# Patient Record
Sex: FEMALE | Race: WHITE | Employment: OTHER | URBAN - METROPOLITAN AREA
[De-identification: names, ages, dates, MRNs, and addresses within clinical notes are randomized per-mention and may not be internally consistent; named-entity substitution may affect disease eponyms.]

---

## 2020-06-29 ENCOUNTER — HOSPITAL ENCOUNTER (OUTPATIENT)
Dept: LAB | Age: 85
Discharge: HOME | End: 2020-06-29

## 2020-06-29 LAB
ANION GAP SERPL CALC-SCNC: 6 MMOL/L (ref 7–16)
BASOPHILS # BLD: 0 K/UL (ref 0–0.2)
BASOPHILS NFR BLD: 0 % (ref 0–2)
BUN SERPL-MCNC: 22 MG/DL (ref 8–23)
CALCIUM SERPL-MCNC: 9 MG/DL (ref 8.3–10.4)
CHLORIDE SERPL-SCNC: 106 MMOL/L (ref 98–107)
CO2 SERPL-SCNC: 29 MMOL/L (ref 21–32)
CREAT SERPL-MCNC: 1.05 MG/DL (ref 0.6–1)
DIFFERENTIAL METHOD BLD: ABNORMAL
EOSINOPHIL # BLD: 0.1 K/UL (ref 0–0.8)
EOSINOPHIL NFR BLD: 1 % (ref 0.5–7.8)
ERYTHROCYTE [DISTWIDTH] IN BLOOD BY AUTOMATED COUNT: 13.7 % (ref 11.9–14.6)
GLUCOSE SERPL-MCNC: 99 MG/DL (ref 65–100)
HCT VFR BLD AUTO: 36.9 % (ref 35.8–46.3)
HGB BLD-MCNC: 11 G/DL (ref 11.7–15.4)
IMM GRANULOCYTES # BLD AUTO: 0 K/UL (ref 0–0.5)
IMM GRANULOCYTES NFR BLD AUTO: 0 % (ref 0–5)
LYMPHOCYTES # BLD: 2.9 K/UL (ref 0.5–4.6)
LYMPHOCYTES NFR BLD: 33 % (ref 13–44)
MCH RBC QN AUTO: 27.5 PG (ref 26.1–32.9)
MCHC RBC AUTO-ENTMCNC: 29.8 G/DL (ref 31.4–35)
MCV RBC AUTO: 92.3 FL (ref 79.6–97.8)
MONOCYTES # BLD: 0.5 K/UL (ref 0.1–1.3)
MONOCYTES NFR BLD: 6 % (ref 4–12)
NEUTS SEG # BLD: 5.2 K/UL (ref 1.7–8.2)
NEUTS SEG NFR BLD: 60 % (ref 43–78)
NRBC # BLD: 0 K/UL (ref 0–0.2)
PLATELET # BLD AUTO: 143 K/UL (ref 150–450)
PMV BLD AUTO: 13.2 FL (ref 9.4–12.3)
POTASSIUM SERPL-SCNC: 4.2 MMOL/L (ref 3.5–5.1)
RBC # BLD AUTO: 4 M/UL (ref 4.05–5.2)
SODIUM SERPL-SCNC: 141 MMOL/L (ref 136–145)
WBC # BLD AUTO: 8.7 K/UL (ref 4.3–11.1)

## 2020-06-29 PROCEDURE — 85025 COMPLETE CBC W/AUTO DIFF WBC: CPT

## 2020-06-29 PROCEDURE — 80048 BASIC METABOLIC PNL TOTAL CA: CPT

## 2020-08-02 ENCOUNTER — HOSPITAL ENCOUNTER (INPATIENT)
Age: 85
LOS: 5 days | Discharge: NURSING, REHAB OR LONG-TERM CARE | DRG: 871 | End: 2020-08-07
Attending: EMERGENCY MEDICINE | Admitting: INTERNAL MEDICINE
Payer: MEDICARE

## 2020-08-02 ENCOUNTER — APPOINTMENT (OUTPATIENT)
Dept: GENERAL RADIOLOGY | Age: 85
DRG: 871 | End: 2020-08-02
Attending: EMERGENCY MEDICINE
Payer: MEDICARE

## 2020-08-02 ENCOUNTER — APPOINTMENT (OUTPATIENT)
Dept: CT IMAGING | Age: 85
DRG: 871 | End: 2020-08-02
Attending: INTERNAL MEDICINE
Payer: MEDICARE

## 2020-08-02 DIAGNOSIS — N30.90 CYSTITIS: ICD-10-CM

## 2020-08-02 DIAGNOSIS — R41.82 ALTERED MENTAL STATUS, UNSPECIFIED ALTERED MENTAL STATUS TYPE: Primary | ICD-10-CM

## 2020-08-02 DIAGNOSIS — N17.9 AKI (ACUTE KIDNEY INJURY) (HCC): ICD-10-CM

## 2020-08-02 DIAGNOSIS — E86.0 DEHYDRATION: ICD-10-CM

## 2020-08-02 PROBLEM — G30.9 ALZHEIMER'S DEMENTIA WITH BEHAVIORAL DISTURBANCE (HCC): Chronic | Status: ACTIVE | Noted: 2020-08-02

## 2020-08-02 PROBLEM — G93.41 ACUTE METABOLIC ENCEPHALOPATHY: Status: ACTIVE | Noted: 2020-08-02

## 2020-08-02 PROBLEM — Z66 DNR (DO NOT RESUSCITATE): Chronic | Status: ACTIVE | Noted: 2020-08-02

## 2020-08-02 PROBLEM — E87.0 HYPERNATREMIA: Status: ACTIVE | Noted: 2020-08-02

## 2020-08-02 PROBLEM — F02.818 ALZHEIMER'S DEMENTIA WITH BEHAVIORAL DISTURBANCE (HCC): Chronic | Status: ACTIVE | Noted: 2020-08-02

## 2020-08-02 PROBLEM — N39.0 UTI (URINARY TRACT INFECTION): Status: ACTIVE | Noted: 2020-08-02

## 2020-08-02 LAB
ALBUMIN SERPL-MCNC: 2.5 G/DL (ref 3.2–4.6)
ALBUMIN/GLOB SERPL: 0.5 {RATIO} (ref 1.2–3.5)
ALP SERPL-CCNC: 101 U/L (ref 50–136)
ALT SERPL-CCNC: 17 U/L (ref 12–65)
ANION GAP SERPL CALC-SCNC: 7 MMOL/L (ref 7–16)
APPEARANCE UR: ABNORMAL
AST SERPL-CCNC: 22 U/L (ref 15–37)
BACTERIA URNS QL MICRO: ABNORMAL /HPF
BASOPHILS # BLD: 0 K/UL (ref 0–0.2)
BASOPHILS NFR BLD: 0 % (ref 0–2)
BILIRUB SERPL-MCNC: 0.3 MG/DL (ref 0.2–1.1)
BILIRUB UR QL: NEGATIVE
BUN SERPL-MCNC: 56 MG/DL (ref 8–23)
CALCIUM SERPL-MCNC: 9 MG/DL (ref 8.3–10.4)
CHLORIDE SERPL-SCNC: 118 MMOL/L (ref 98–107)
CO2 SERPL-SCNC: 25 MMOL/L (ref 21–32)
COLOR UR: YELLOW
CREAT SERPL-MCNC: 2 MG/DL (ref 0.6–1)
DIFFERENTIAL METHOD BLD: ABNORMAL
EOSINOPHIL # BLD: 0 K/UL (ref 0–0.8)
EOSINOPHIL NFR BLD: 0 % (ref 0.5–7.8)
EPI CELLS #/AREA URNS HPF: ABNORMAL /HPF
ERYTHROCYTE [DISTWIDTH] IN BLOOD BY AUTOMATED COUNT: 13.6 % (ref 11.9–14.6)
GLOBULIN SER CALC-MCNC: 5 G/DL (ref 2.3–3.5)
GLUCOSE BLD STRIP.AUTO-MCNC: 108 MG/DL (ref 65–100)
GLUCOSE BLD STRIP.AUTO-MCNC: 90 MG/DL (ref 65–100)
GLUCOSE SERPL-MCNC: 95 MG/DL (ref 65–100)
GLUCOSE UR STRIP.AUTO-MCNC: NEGATIVE MG/DL
HCT VFR BLD AUTO: 33.5 % (ref 35.8–46.3)
HGB BLD-MCNC: 10.5 G/DL (ref 11.7–15.4)
HGB UR QL STRIP: ABNORMAL
IMM GRANULOCYTES # BLD AUTO: 0.1 K/UL (ref 0–0.5)
IMM GRANULOCYTES NFR BLD AUTO: 1 % (ref 0–5)
KETONES UR QL STRIP.AUTO: NEGATIVE MG/DL
LACTATE SERPL-SCNC: 1 MMOL/L (ref 0.4–2)
LEUKOCYTE ESTERASE UR QL STRIP.AUTO: ABNORMAL
LIPASE SERPL-CCNC: 299 U/L (ref 73–393)
LYMPHOCYTES # BLD: 1.8 K/UL (ref 0.5–4.6)
LYMPHOCYTES NFR BLD: 29 % (ref 13–44)
MCH RBC QN AUTO: 28.3 PG (ref 26.1–32.9)
MCHC RBC AUTO-ENTMCNC: 31.3 G/DL (ref 31.4–35)
MCV RBC AUTO: 90.3 FL (ref 79.6–97.8)
MONOCYTES # BLD: 0.5 K/UL (ref 0.1–1.3)
MONOCYTES NFR BLD: 8 % (ref 4–12)
NEUTS SEG # BLD: 3.7 K/UL (ref 1.7–8.2)
NEUTS SEG NFR BLD: 62 % (ref 43–78)
NITRITE UR QL STRIP.AUTO: NEGATIVE
NRBC # BLD: 0 K/UL (ref 0–0.2)
OTHER OBSERVATIONS,UCOM: ABNORMAL
PH UR STRIP: 8 [PH] (ref 5–9)
PLATELET # BLD AUTO: 142 K/UL (ref 150–450)
PMV BLD AUTO: 13.4 FL (ref 9.4–12.3)
POTASSIUM SERPL-SCNC: 4.4 MMOL/L (ref 3.5–5.1)
PROT SERPL-MCNC: 7.5 G/DL (ref 6.3–8.2)
PROT UR STRIP-MCNC: 100 MG/DL
RBC # BLD AUTO: 3.71 M/UL (ref 4.05–5.2)
RBC #/AREA URNS HPF: ABNORMAL /HPF
SODIUM SERPL-SCNC: 150 MMOL/L (ref 136–145)
SP GR UR REFRACTOMETRY: 1.01 (ref 1–1.02)
UROBILINOGEN UR QL STRIP.AUTO: 1 EU/DL (ref 0.2–1)
WBC # BLD AUTO: 6.1 K/UL (ref 4.3–11.1)
WBC URNS QL MICRO: >100 /HPF
YEAST URNS QL MICRO: ABNORMAL

## 2020-08-02 PROCEDURE — 74011000258 HC RX REV CODE- 258: Performed by: EMERGENCY MEDICINE

## 2020-08-02 PROCEDURE — 65270000029 HC RM PRIVATE

## 2020-08-02 PROCEDURE — 87086 URINE CULTURE/COLONY COUNT: CPT

## 2020-08-02 PROCEDURE — 82962 GLUCOSE BLOOD TEST: CPT

## 2020-08-02 PROCEDURE — 96365 THER/PROPH/DIAG IV INF INIT: CPT

## 2020-08-02 PROCEDURE — 74011250636 HC RX REV CODE- 250/636: Performed by: INTERNAL MEDICINE

## 2020-08-02 PROCEDURE — 74011250637 HC RX REV CODE- 250/637: Performed by: INTERNAL MEDICINE

## 2020-08-02 PROCEDURE — 99285 EMERGENCY DEPT VISIT HI MDM: CPT

## 2020-08-02 PROCEDURE — 80053 COMPREHEN METABOLIC PANEL: CPT

## 2020-08-02 PROCEDURE — 51702 INSERT TEMP BLADDER CATH: CPT

## 2020-08-02 PROCEDURE — 81001 URINALYSIS AUTO W/SCOPE: CPT

## 2020-08-02 PROCEDURE — 83690 ASSAY OF LIPASE: CPT

## 2020-08-02 PROCEDURE — 71045 X-RAY EXAM CHEST 1 VIEW: CPT

## 2020-08-02 PROCEDURE — 74011250636 HC RX REV CODE- 250/636: Performed by: EMERGENCY MEDICINE

## 2020-08-02 PROCEDURE — 0T9B70Z DRAINAGE OF BLADDER WITH DRAINAGE DEVICE, VIA NATURAL OR ARTIFICIAL OPENING: ICD-10-PCS | Performed by: INTERNAL MEDICINE

## 2020-08-02 PROCEDURE — 87040 BLOOD CULTURE FOR BACTERIA: CPT

## 2020-08-02 PROCEDURE — 74011000258 HC RX REV CODE- 258: Performed by: INTERNAL MEDICINE

## 2020-08-02 PROCEDURE — 70450 CT HEAD/BRAIN W/O DYE: CPT

## 2020-08-02 PROCEDURE — 83605 ASSAY OF LACTIC ACID: CPT

## 2020-08-02 PROCEDURE — 85025 COMPLETE CBC W/AUTO DIFF WBC: CPT

## 2020-08-02 PROCEDURE — 74011250637 HC RX REV CODE- 250/637: Performed by: EMERGENCY MEDICINE

## 2020-08-02 RX ORDER — OXYCODONE HYDROCHLORIDE 5 MG/1
5 TABLET ORAL
Status: DISCONTINUED | OUTPATIENT
Start: 2020-08-02 | End: 2020-08-07 | Stop reason: HOSPADM

## 2020-08-02 RX ORDER — MIRTAZAPINE 15 MG/1
15 TABLET, FILM COATED ORAL
Status: DISCONTINUED | OUTPATIENT
Start: 2020-08-02 | End: 2020-08-07 | Stop reason: HOSPADM

## 2020-08-02 RX ORDER — POLYETHYLENE GLYCOL 3350 17 G/17G
17 POWDER, FOR SOLUTION ORAL DAILY PRN
Status: DISCONTINUED | OUTPATIENT
Start: 2020-08-02 | End: 2020-08-07 | Stop reason: HOSPADM

## 2020-08-02 RX ORDER — NALOXONE HYDROCHLORIDE 1 MG/ML
2 INJECTION INTRAMUSCULAR; INTRAVENOUS; SUBCUTANEOUS
Status: DISCONTINUED | OUTPATIENT
Start: 2020-08-02 | End: 2020-08-02

## 2020-08-02 RX ORDER — FACIAL-BODY WIPES
10 EACH TOPICAL DAILY PRN
Status: DISCONTINUED | OUTPATIENT
Start: 2020-08-02 | End: 2020-08-07 | Stop reason: HOSPADM

## 2020-08-02 RX ORDER — ONDANSETRON 4 MG/1
4 TABLET, ORALLY DISINTEGRATING ORAL
Status: DISCONTINUED | OUTPATIENT
Start: 2020-08-02 | End: 2020-08-07 | Stop reason: HOSPADM

## 2020-08-02 RX ORDER — ROSUVASTATIN CALCIUM 20 MG/1
20 TABLET, COATED ORAL
Status: DISCONTINUED | OUTPATIENT
Start: 2020-08-02 | End: 2020-08-07 | Stop reason: HOSPADM

## 2020-08-02 RX ORDER — SODIUM CHLORIDE 9 MG/ML
150 INJECTION, SOLUTION INTRAVENOUS ONCE
Status: DISCONTINUED | OUTPATIENT
Start: 2020-08-02 | End: 2020-08-02

## 2020-08-02 RX ORDER — FAMOTIDINE 20 MG/1
20 TABLET, FILM COATED ORAL
Status: DISCONTINUED | OUTPATIENT
Start: 2020-08-02 | End: 2020-08-04

## 2020-08-02 RX ORDER — HYDRALAZINE HYDROCHLORIDE 20 MG/ML
20 INJECTION INTRAMUSCULAR; INTRAVENOUS
Status: DISCONTINUED | OUTPATIENT
Start: 2020-08-02 | End: 2020-08-07 | Stop reason: HOSPADM

## 2020-08-02 RX ORDER — SODIUM CHLORIDE 9 MG/ML
100 INJECTION, SOLUTION INTRAVENOUS ONCE
Status: COMPLETED | OUTPATIENT
Start: 2020-08-02 | End: 2020-08-02

## 2020-08-02 RX ORDER — MAGNESIUM SULFATE HEPTAHYDRATE 40 MG/ML
2 INJECTION, SOLUTION INTRAVENOUS AS NEEDED
Status: DISCONTINUED | OUTPATIENT
Start: 2020-08-02 | End: 2020-08-07 | Stop reason: HOSPADM

## 2020-08-02 RX ORDER — LORAZEPAM 0.5 MG/1
0.5 TABLET ORAL
Status: DISCONTINUED | OUTPATIENT
Start: 2020-08-02 | End: 2020-08-07 | Stop reason: HOSPADM

## 2020-08-02 RX ORDER — ACETAMINOPHEN 650 MG/1
650 SUPPOSITORY RECTAL
Status: DISCONTINUED | OUTPATIENT
Start: 2020-08-02 | End: 2020-08-07 | Stop reason: HOSPADM

## 2020-08-02 RX ORDER — ACETAMINOPHEN 650 MG/1
650 SUPPOSITORY RECTAL
Status: COMPLETED | OUTPATIENT
Start: 2020-08-02 | End: 2020-08-02

## 2020-08-02 RX ORDER — POTASSIUM CHLORIDE 750 MG/1
40 TABLET, EXTENDED RELEASE ORAL AS NEEDED
Status: DISCONTINUED | OUTPATIENT
Start: 2020-08-02 | End: 2020-08-07 | Stop reason: HOSPADM

## 2020-08-02 RX ORDER — POTASSIUM CHLORIDE 7.45 MG/ML
10 INJECTION INTRAVENOUS AS NEEDED
Status: DISCONTINUED | OUTPATIENT
Start: 2020-08-02 | End: 2020-08-07 | Stop reason: HOSPADM

## 2020-08-02 RX ORDER — INSULIN LISPRO 100 [IU]/ML
INJECTION, SOLUTION INTRAVENOUS; SUBCUTANEOUS
Status: DISCONTINUED | OUTPATIENT
Start: 2020-08-02 | End: 2020-08-07 | Stop reason: HOSPADM

## 2020-08-02 RX ORDER — HEPARIN SODIUM 5000 [USP'U]/ML
5000 INJECTION, SOLUTION INTRAVENOUS; SUBCUTANEOUS EVERY 8 HOURS
Status: DISCONTINUED | OUTPATIENT
Start: 2020-08-02 | End: 2020-08-07

## 2020-08-02 RX ORDER — ONDANSETRON 2 MG/ML
4 INJECTION INTRAMUSCULAR; INTRAVENOUS
Status: DISCONTINUED | OUTPATIENT
Start: 2020-08-02 | End: 2020-08-07 | Stop reason: HOSPADM

## 2020-08-02 RX ORDER — MAG HYDROX/ALUMINUM HYD/SIMETH 200-200-20
30 SUSPENSION, ORAL (FINAL DOSE FORM) ORAL
Status: DISCONTINUED | OUTPATIENT
Start: 2020-08-02 | End: 2020-08-07 | Stop reason: HOSPADM

## 2020-08-02 RX ORDER — DICLOFENAC SODIUM 75 MG/1
75 TABLET, DELAYED RELEASE ORAL 2 TIMES DAILY
Status: DISCONTINUED | OUTPATIENT
Start: 2020-08-02 | End: 2020-08-03

## 2020-08-02 RX ORDER — DEXTROSE 50 % IN WATER (D50W) INTRAVENOUS SYRINGE
25-50 AS NEEDED
Status: DISCONTINUED | OUTPATIENT
Start: 2020-08-02 | End: 2020-08-07 | Stop reason: HOSPADM

## 2020-08-02 RX ORDER — DEXTROSE MONOHYDRATE AND SODIUM CHLORIDE 5; .45 G/100ML; G/100ML
125 INJECTION, SOLUTION INTRAVENOUS CONTINUOUS
Status: DISCONTINUED | OUTPATIENT
Start: 2020-08-02 | End: 2020-08-03

## 2020-08-02 RX ORDER — OXYCODONE HYDROCHLORIDE 5 MG/1
10 TABLET ORAL
Status: DISCONTINUED | OUTPATIENT
Start: 2020-08-02 | End: 2020-08-07 | Stop reason: HOSPADM

## 2020-08-02 RX ORDER — GUAIFENESIN/DEXTROMETHORPHAN 100-10MG/5
10 SYRUP ORAL
Status: DISCONTINUED | OUTPATIENT
Start: 2020-08-02 | End: 2020-08-07 | Stop reason: HOSPADM

## 2020-08-02 RX ORDER — GUAIFENESIN 600 MG/1
1200 TABLET, EXTENDED RELEASE ORAL
Status: DISCONTINUED | OUTPATIENT
Start: 2020-08-02 | End: 2020-08-07 | Stop reason: HOSPADM

## 2020-08-02 RX ORDER — HYDROXYZINE PAMOATE 50 MG/1
50 CAPSULE ORAL
Status: DISCONTINUED | OUTPATIENT
Start: 2020-08-02 | End: 2020-08-07 | Stop reason: HOSPADM

## 2020-08-02 RX ORDER — AMOXICILLIN 250 MG
2 CAPSULE ORAL
Status: DISCONTINUED | OUTPATIENT
Start: 2020-08-02 | End: 2020-08-07 | Stop reason: HOSPADM

## 2020-08-02 RX ORDER — DEXTROSE 40 %
15 GEL (GRAM) ORAL AS NEEDED
Status: DISCONTINUED | OUTPATIENT
Start: 2020-08-02 | End: 2020-08-07 | Stop reason: HOSPADM

## 2020-08-02 RX ORDER — ACETAMINOPHEN 325 MG/1
650 TABLET ORAL
Status: DISCONTINUED | OUTPATIENT
Start: 2020-08-02 | End: 2020-08-07 | Stop reason: HOSPADM

## 2020-08-02 RX ORDER — SODIUM CHLORIDE 0.9 % (FLUSH) 0.9 %
5-40 SYRINGE (ML) INJECTION EVERY 8 HOURS
Status: DISCONTINUED | OUTPATIENT
Start: 2020-08-02 | End: 2020-08-07 | Stop reason: HOSPADM

## 2020-08-02 RX ORDER — ZOLPIDEM TARTRATE 5 MG/1
5 TABLET ORAL
Status: DISCONTINUED | OUTPATIENT
Start: 2020-08-02 | End: 2020-08-03

## 2020-08-02 RX ORDER — SODIUM CHLORIDE 0.9 % (FLUSH) 0.9 %
5-40 SYRINGE (ML) INJECTION AS NEEDED
Status: DISCONTINUED | OUTPATIENT
Start: 2020-08-02 | End: 2020-08-07 | Stop reason: HOSPADM

## 2020-08-02 RX ADMIN — SODIUM CHLORIDE 100 ML/HR: 9 INJECTION, SOLUTION INTRAVENOUS at 15:58

## 2020-08-02 RX ADMIN — Medication 10 ML: at 21:23

## 2020-08-02 RX ADMIN — ROSUVASTATIN CALCIUM 20 MG: 20 TABLET, COATED ORAL at 21:23

## 2020-08-02 RX ADMIN — Medication 10 ML: at 17:14

## 2020-08-02 RX ADMIN — CEFTRIAXONE SODIUM 1 G: 1 INJECTION, POWDER, FOR SOLUTION INTRAMUSCULAR; INTRAVENOUS at 19:54

## 2020-08-02 RX ADMIN — Medication 10 ML: at 18:00

## 2020-08-02 RX ADMIN — HEPARIN SODIUM 5000 UNITS: 5000 INJECTION INTRAVENOUS; SUBCUTANEOUS at 21:23

## 2020-08-02 RX ADMIN — HEPARIN SODIUM 5000 UNITS: 5000 INJECTION INTRAVENOUS; SUBCUTANEOUS at 17:30

## 2020-08-02 RX ADMIN — SODIUM CHLORIDE 1000 ML: 9 INJECTION, SOLUTION INTRAVENOUS at 14:35

## 2020-08-02 RX ADMIN — DEXTROSE MONOHYDRATE AND SODIUM CHLORIDE 125 ML/HR: 5; .45 INJECTION, SOLUTION INTRAVENOUS at 17:13

## 2020-08-02 RX ADMIN — ACETAMINOPHEN 650 MG: 650 SUPPOSITORY RECTAL at 14:35

## 2020-08-02 RX ADMIN — CEFEPIME HYDROCHLORIDE 2 G: 2 INJECTION, POWDER, FOR SOLUTION INTRAVENOUS at 15:08

## 2020-08-02 NOTE — ED TRIAGE NOTES
Pt arrived via EMS from [facility] with c/o ams. Pt has hx of alzheimer and is no longer responding to any stimuli that started today. Pt is unresponsive at this time. Pt is a DNR.  /78 HR 63 SpO 94 RR 16

## 2020-08-02 NOTE — H&P
Hospitalist Note     Admit Date:  2020  2:03 PM   Name:  Hiren Prabhakar   Age:  81 y.o.  :  1935   MRN:  [id number]   PCP:  Toya Siddiqi MD  Treatment Team: Attending Provider: Tavares Zimmer MD; Primary Nurse: Rubén Strong RN    HPI/Subjective:   Pt is an 82 y/o F resident of [facility] with DM, HTN, who presented to ER with AMS. History limited to chart info due to confusion. Unclear baseline mental status but dementia documented in chart. She denies pain or complaints but cannot give focused history and has a hard time focusing on me; eyes wander. Staff at [facility] reported she was less responsive so sent her here. She was found to have UTI, KAILEY, hyperNa on lab workup. Urine brown and cloudy    ROS cannot be obtained due to pt condition  Past Medical History:   Diagnosis Date    Benign hypertension     Controlled type 2 diabetes mellitus with diabetic autonomic neuropathy, with long-term current use of insulin (HCC)     Edema     Hyperlipidemia       Past Surgical History:   Procedure Laterality Date    HX AMPUTATION Left     Partial foot    HX CORONARY ARTERY BYPASS GRAFT      HX KNEE ARTHROSCOPY Bilateral     HX PARTIAL HYSTERECTOMY        No Known Allergies   Social History     Tobacco Use    Smoking status: Never Smoker    Smokeless tobacco: Never Used   Substance Use Topics    Alcohol use: No      Family History   Problem Relation Age of Onset    Heart Disease Mother       Family history reviewed and noncontributory. Immunization History   Administered Date(s) Administered    Influenza High Dose Vaccine PF 10/13/2015, 2016    Influenza Vaccine (Trivalent) 2015, 2016    Pneumococcal Polysaccharide (PPSV-23) 2004, 10/04/2013, 2015     PTA Medications:  Prior to Admission Medications   Prescriptions Last Dose Informant Patient Reported? Taking?    BD INSULIN PEN NEEDLE UF MINI 31 gauge x 3/16\" ndle   No No   Sig: USE 1 NEEDLE DAILY   BD SINGLE USE SWABS REGULAR padm   Yes No   TRAVATAN Z 0.004 % ophthalmic solution   Yes No   Sig: INSTILL 1 DROP IN BOTH EYES NIGHTLY   TRUE METRIX AIR GLUCOSE METER monitoring kit   No No   Sig: USE AS DIRECTED   TRUE METRIX GLUCOSE TEST STRIP strip   Yes No   TRUE METRIX LEVEL 1 soln   Yes No   TRUEPLUS LANCETS 28 gauge misc   Yes No   diclofenac EC (VOLTAREN) 75 mg EC tablet   No No   Sig: Take 1 Tab by mouth two (2) times a day. furosemide (LASIX) 40 mg tablet   No No   Sig: TAKE 1 TABLET BY MOUTH EVERY DAY   insulin detemir (LEVEMIR FLEXPEN) 100 unit/mL (3 mL) inpn   No No   Si Units by SubCUTAneous route daily. insulin lispro (HUMALOG) 100 unit/mL injection   Yes No   Sig: by SubCUTAneous route. rosuvastatin (CRESTOR) 20 mg tablet   No No   Sig: TAKE 1 TABLET BY MOUTH EVERY DAY   valsartan (DIOVAN) 160 mg tablet   No No   Sig: Take 1 Tab by mouth daily. Facility-Administered Medications: None       Objective:     Patient Vitals for the past 24 hrs:   Temp Pulse Resp BP SpO2   20 1428 99.9 °F (37.7 °C)       20 1405 97.7 °F (36.5 °C) 63 14 127/51 92 %     Oxygen Therapy  O2 Sat (%): 92 % (20 1405)  O2 Device: Room air (20 1405)    Estimated body mass index is 34.95 kg/m² as calculated from the following:    Height as of this encounter: 5' 5\" (1.651 m). Weight as of this encounter: 95.3 kg (210 lb). No intake or output data in the 24 hours ending 20 1549    *Note that automatically entered I/Os may not be accurate; dependent on patient compliance with collection and accurate  by assistants. Physical Exam:  General:    Well nourished. Somnolent, confused. Eyes:   Normal sclerae. Extraocular movements intact. HENT:  Normocephalic, atraumatic. Moist mucous membranes  CV:   RRR. No m/r/g. Lungs:  CTAB. No wheezing, rhonchi, or rales. Abdomen: Soft, nontender, nondistended. Extremities: Warm and dry.   No cyanosis or edema. Neurologic: CN II-XII grossly intact. Sensation intact. Skin:     No rashes or jaundice. Normal coloration  Psych:   Cannot assess    I reviewed the labs, imaging, EKGs, telemetry, and other studies done this admission. Data Reviewed:   Recent Results (from the past 24 hour(s))   CBC WITH AUTOMATED DIFF    Collection Time: 08/02/20  2:27 PM   Result Value Ref Range    WBC 6.1 4.3 - 11.1 K/uL    RBC 3.71 (L) 4.05 - 5.2 M/uL    HGB 10.5 (L) 11.7 - 15.4 g/dL    HCT 33.5 (L) 35.8 - 46.3 %    MCV 90.3 79.6 - 97.8 FL    MCH 28.3 26.1 - 32.9 PG    MCHC 31.3 (L) 31.4 - 35.0 g/dL    RDW 13.6 11.9 - 14.6 %    PLATELET 808 (L) 584 - 450 K/uL    MPV 13.4 (H) 9.4 - 12.3 FL    ABSOLUTE NRBC 0.00 0.0 - 0.2 K/uL    NEUTROPHILS 62 43 - 78 %    LYMPHOCYTES 29 13 - 44 %    MONOCYTES 8 4.0 - 12.0 %    EOSINOPHILS 0 (L) 0.5 - 7.8 %    BASOPHILS 0 0.0 - 2.0 %    IMMATURE GRANULOCYTES 1 0.0 - 5.0 %    ABS. NEUTROPHILS 3.7 1.7 - 8.2 K/UL    ABS. LYMPHOCYTES 1.8 0.5 - 4.6 K/UL    ABS. MONOCYTES 0.5 0.1 - 1.3 K/UL    ABS. EOSINOPHILS 0.0 0.0 - 0.8 K/UL    ABS. BASOPHILS 0.0 0.0 - 0.2 K/UL    ABS. IMM. GRANS. 0.1 0.0 - 0.5 K/UL    DF AUTOMATED     LIPASE    Collection Time: 08/02/20  2:27 PM   Result Value Ref Range    Lipase 299 73 - 699 U/L   METABOLIC PANEL, COMPREHENSIVE    Collection Time: 08/02/20  2:27 PM   Result Value Ref Range    Sodium 150 (H) 136 - 145 mmol/L    Potassium 4.4 3.5 - 5.1 mmol/L    Chloride 118 (H) 98 - 107 mmol/L    CO2 25 21 - 32 mmol/L    Anion gap 7 7 - 16 mmol/L    Glucose 95 65 - 100 mg/dL    BUN 56 (H) 8 - 23 MG/DL    Creatinine 2.00 (H) 0.6 - 1.0 MG/DL    GFR est AA 31 (L) >60 ml/min/1.73m2    GFR est non-AA 25 (L) >60 ml/min/1.73m2    Calcium 9.0 8.3 - 10.4 MG/DL    Bilirubin, total 0.3 0.2 - 1.1 MG/DL    ALT (SGPT) 17 12 - 65 U/L    AST (SGOT) 22 15 - 37 U/L    Alk.  phosphatase 101 50 - 136 U/L    Protein, total 7.5 6.3 - 8.2 g/dL    Albumin 2.5 (L) 3.2 - 4.6 g/dL    Globulin 5.0 (H) 2.3 - 3.5 g/dL    A-G Ratio 0.5 (L) 1.2 - 3.5     URINALYSIS W/ RFLX MICROSCOPIC    Collection Time: 08/02/20  2:27 PM   Result Value Ref Range    Color YELLOW      Appearance TURBID      Specific gravity 1.013 1.001 - 1.023      pH (UA) 8.0 5.0 - 9.0      Protein 100 (A) NEG mg/dL    Glucose Negative mg/dL    Ketone Negative NEG mg/dL    Bilirubin Negative NEG      Blood LARGE (A) NEG      Urobilinogen 1.0 0.2 - 1.0 EU/dL    Nitrites Negative NEG      Leukocyte Esterase LARGE (A) NEG      WBC >100 0 /hpf    RBC 20-50 0 /hpf    Epithelial cells 0-3 0 /hpf    Bacteria 4+ (H) 0 /hpf    Yeast OCCASIONAL      Other observations RESULTS VERIFIED MANUALLY     LACTIC ACID    Collection Time: 08/02/20  2:27 PM   Result Value Ref Range    Lactic acid 1.0 0.4 - 2.0 MMOL/L       All Micro Results     Procedure Component Value Units Date/Time    CULTURE, BLOOD [[id number]] Collected:  08/02/20 1427    Order Status:  Completed Specimen:  Blood Updated:  08/02/20 1454    CULTURE, BLOOD [[id number]] Collected:  08/02/20 1430    Order Status:  Completed Specimen:  Blood Updated:  08/02/20 1454    CULTURE, URINE [[id number]]     Order Status:  Sent Specimen:  Cath Urine     CULTURE, URINE [[id number]]     Order Status:  Canceled Specimen:  Cath Urine           Current Facility-Administered Medications   Medication Dose Route Frequency    0.9% sodium chloride infusion  100 mL/hr IntraVENous ONCE     Current Outpatient Medications   Medication Sig    furosemide (LASIX) 40 mg tablet TAKE 1 TABLET BY MOUTH EVERY DAY    valsartan (DIOVAN) 160 mg tablet Take 1 Tab by mouth daily.  diclofenac EC (VOLTAREN) 75 mg EC tablet Take 1 Tab by mouth two (2) times a day.  rosuvastatin (CRESTOR) 20 mg tablet TAKE 1 TABLET BY MOUTH EVERY DAY    insulin detemir (LEVEMIR FLEXPEN) 100 unit/mL (3 mL) inpn 75 Units by SubCUTAneous route daily.     BD INSULIN PEN NEEDLE UF MINI 31 gauge x 3/16\" ndle USE 1 NEEDLE DAILY    insulin lispro (HUMALOG) 100 unit/mL injection by SubCUTAneous route.  TRAVATAN Z 0.004 % ophthalmic solution INSTILL 1 DROP IN BOTH EYES NIGHTLY    TRUE METRIX AIR GLUCOSE METER monitoring kit USE AS DIRECTED    BD SINGLE USE SWABS REGULAR padm     TRUE METRIX GLUCOSE TEST STRIP strip     TRUE METRIX LEVEL 1 soln     TRUEPLUS LANCETS 28 gauge misc        Other Studies:  No results found for this visit on 08/02/20. Xr Chest Port    Result Date: 8/2/2020  EXAM: Single view chest radiograph. INDICATION: Altered mental status. COMPARISON: None. FINDINGS: Hypoventilatory exam with bronchovascular crowding and bibasilar atelectasis. Cardiomegaly evident. Sternal wires appear intact. Mediastinal surgical clips evident. Atherosclerotic aortic arch. Advanced right shoulder joint degenerative changes. IMPRESSION: 1. Hypoventilatory exam with bronchovascular crowding and bibasilar atelectasis. 2. Cardiomegaly status post median sternotomy. SARS-CoV-2 Lab Results  \"Novel Coronavirus\" Test: No results found for: COV2NT   \"Emergent Disease\" Test: No results found for: EDPR  \"SARS-COV-2\" Test: No results found for: XGCOVT  \"Precision Labs\" Test: No results found for: RSLT  Rapid Test: No results found for: COVR           Assessment and Plan:     Hospital Problems as of 8/2/2020 Date Reviewed: 2/28/2017          Codes Class Noted - Resolved POA    Hypernatremia ICD-10-CM: E87.0  ICD-9-CM: 276.0  8/2/2020 - Present Unknown        UTI (urinary tract infection) ICD-10-CM: N39.0  ICD-9-CM: 599.0  8/2/2020 - Present Unknown        KAILEY (acute kidney injury) ([facility] [address] ICD-10-CM: N17.9  ICD-9-CM: 584.9  8/2/2020 - Present Unknown        Acute metabolic encephalopathy XFK-17-FS: G93.41  ICD-9-CM: 348.31  8/2/2020 - Present Unknown              Plan:  · Inpatient  · Rocephin  · Follow urine cx  · d5 1/2 NS IVF  · Hold home ARB, lasix  · ISS for DM.   Hold home lantus; doubt she needs 75 units as listed  · SLP eval  · PT/OT    Discharge planning:  Back to SNF when improved.   Will need negative COVID most likely but will check with CM first.    DVT ppx ordered  Code status:  DNR  Estimated LOS:  Greater than 2 midnights  Risk:  high    Signed:  Mariluz Felton MD

## 2020-08-02 NOTE — ED NOTES
TRANSFER - OUT REPORT:    Verbal report given to Yola Stallings RN on Shiloh [place]  being transferred to [facility] for routine progression of care       Report consisted of patients Situation, Background, Assessment and   Recommendations(SBAR). Information from the following report(s) SBAR was reviewed with the receiving nurse. Lines:   Peripheral IV 08/02/20 Left Antecubital (Active)   Site Assessment Clean, dry, & intact 08/02/20 1434   Phlebitis Assessment 0 08/02/20 1434   Infiltration Assessment 0 08/02/20 1434   Dressing Status Clean, dry, & intact 08/02/20 1434        Opportunity for questions and clarification was provided.       Patient transported with:   Satiety

## 2020-08-02 NOTE — ED PROVIDER NOTES
In just put a Wray in her 27-year-old female presenting from a skilled nursing facility for decreased level of consciousness. Patient apparently has Alzheimer's they stated that she was not interactive today. She was sent for further evaluation. According to the staff the patient has a DNR/DNI status. On arrival the patient is noncommunicative and staring to the left    The history is provided by the patient. Altered mental status    This is a new problem. The current episode started yesterday. The problem has been gradually worsening. Associated symptoms include confusion, somnolence, unresponsiveness and weakness. Pertinent negatives include no seizures, no agitation, no delusions, no hallucinations, no self-injury, no violence, no tingling and no numbness. Mental status baseline is severe dementia. Risk factors include dementia. Her past medical history is significant for dementia. Her past medical history does not include diabetes, seizures, liver disease, CVA, TIA, AIDS, hypertension, COPD, depression, psychotropic medication treatment, head trauma or heart disease.         Past Medical History:   Diagnosis Date    Benign hypertension     Controlled type 2 diabetes mellitus with diabetic autonomic neuropathy, with long-term current use of insulin (HCC)     Edema     Hyperlipidemia        Past Surgical History:   Procedure Laterality Date    HX AMPUTATION Left     Partial foot    HX CORONARY ARTERY BYPASS GRAFT  2005    HX KNEE ARTHROSCOPY Bilateral     HX PARTIAL HYSTERECTOMY           Family History:   Problem Relation Age of Onset    Heart Disease Mother        Social History     Socioeconomic History    Marital status:      Spouse name: Not on file    Number of children: Not on file    Years of education: Not on file    Highest education level: Not on file   Occupational History    Not on file   Social Needs    Financial resource strain: Not on file    Food insecurity Worry: Not on file     Inability: Not on file    Transportation needs     Medical: Not on file     Non-medical: Not on file   Tobacco Use    Smoking status: Never Smoker    Smokeless tobacco: Never Used   Substance and Sexual Activity    Alcohol use: No    Drug use: No    Sexual activity: Not on file   Lifestyle    Physical activity     Days per week: Not on file     Minutes per session: Not on file    Stress: Not on file   Relationships    Social connections     Talks on phone: Not on file     Gets together: Not on file     Attends Latter day service: Not on file     Active member of club or organization: Not on file     Attends meetings of clubs or organizations: Not on file     Relationship status: Not on file    Intimate partner violence     Fear of current or ex partner: Not on file     Emotionally abused: Not on file     Physically abused: Not on file     Forced sexual activity: Not on file   Other Topics Concern    Not on file   Social History Narrative    Not on file         ALLERGIES: Patient has no known allergies. Review of Systems   Constitutional: Positive for activity change. Neurological: Positive for weakness. Negative for tingling, seizures and numbness. Psychiatric/Behavioral: Positive for confusion. Negative for agitation, hallucinations and self-injury. All other systems reviewed and are negative. Vitals:    08/02/20 1405   BP: 127/51   Pulse: 63   Resp: 14   Temp: 97.7 °F (36.5 °C)   SpO2: 92%   Weight: 95.3 kg (210 lb)   Height: 5' 5\" (1.651 m)            Physical Exam  Vitals signs and nursing note reviewed. Constitutional:       Comments: Awake but not responding and not following commands. Blinking and looking to the left, patient feels febrile   HENT:      Head: Normocephalic and atraumatic. Eyes:      Extraocular Movements: Extraocular movements intact. Conjunctiva/sclera: Conjunctivae normal.      Pupils: Pupils are equal, round, and reactive to light. Neck:      Musculoskeletal: Normal range of motion. Cardiovascular:      Rate and Rhythm: Regular rhythm. Tachycardia present. Pulmonary:      Effort: Pulmonary effort is normal.      Breath sounds: Normal breath sounds. Abdominal:      General: Bowel sounds are normal.      Palpations: Abdomen is soft. Musculoskeletal: Normal range of motion. Skin:     General: Skin is warm and dry. Neurological:      Mental Status: She is oriented to person, place, and time. MDM  Number of Diagnoses or Management Options  KAILEY (acute kidney injury) ([facility] [address]): Altered mental status, unspecified altered mental status type:   Cystitis:   Dehydration:   Diagnosis management comments: 81-year-old female presenting for decreased mental status. Apparently she is being treated currently for urinary tract infection with Bactrim. Patient feels febrile upon arrival so we will do a sepsis work-up.        Amount and/or Complexity of Data Reviewed  Clinical lab tests: ordered and reviewed (Results for orders placed or performed during the hospital encounter of 08/02/20  -CBC WITH AUTOMATED DIFF       Result                      Value             Ref Range           WBC                         6.1               4.3 - 11.1 K*       RBC                         3.71 (L)          4.05 - 5.2 M*       HGB                         10.5 (L)          11.7 - 15.4 *       HCT                         33.5 (L)          35.8 - 46.3 %       MCV                         90.3              79.6 - 97.8 *       MCH                         28.3              26.1 - 32.9 *       MCHC                        31.3 (L)          31.4 - 35.0 *       RDW                         13.6              11.9 - 14.6 %       PLATELET                    142 (L)           150 - 450 K/*       MPV                         13.4 (H)          9.4 - 12.3 FL       ABSOLUTE NRBC               0.00              0.0 - 0.2 K/*       NEUTROPHILS                 62 43 - 78 %           LYMPHOCYTES                 29                13 - 44 %           MONOCYTES                   8                 4.0 - 12.0 %        EOSINOPHILS                 0 (L)             0.5 - 7.8 %         BASOPHILS                   0                 0.0 - 2.0 %         IMMATURE GRANULOCYTES       1                 0.0 - 5.0 %         ABS. NEUTROPHILS            3.7               1.7 - 8.2 K/*       ABS. LYMPHOCYTES            1.8               0.5 - 4.6 K/*       ABS. MONOCYTES              0.5               0.1 - 1.3 K/*       ABS. EOSINOPHILS            0.0               0.0 - 0.8 K/*       ABS. BASOPHILS              0.0               0.0 - 0.2 K/*       ABS. IMM.  GRANS.            0.1               0.0 - 0.5 K/*       DF                          AUTOMATED                        -LIPASE       Result                      Value             Ref Range           Lipase                      299               73 - 525 U/L   -METABOLIC PANEL, COMPREHENSIVE       Result                      Value             Ref Range           Sodium                      150 (H)           136 - 145 mm*       Potassium                   4.4               3.5 - 5.1 mm*       Chloride                    118 (H)           98 - 107 mmo*       CO2                         25                21 - 32 mmol*       Anion gap                   7                 7 - 16 mmol/L       Glucose                     95                65 - 100 mg/*       BUN                         56 (H)            8 - 23 MG/DL        Creatinine                  2.00 (H)          0.6 - 1.0 MG*       GFR est AA                  31 (L)            >60 ml/min/1*       GFR est non-AA              25 (L)            >60 ml/min/1*       Calcium                     9.0               8.3 - 10.4 M*       Bilirubin, total            0.3               0.2 - 1.1 MG*       ALT (SGPT)                  17                12 - 65 U/L         AST (SGOT)                  22 15 - 37 U/L         Alk.  phosphatase            101               50 - 136 U/L        Protein, total              7.5               6.3 - 8.2 g/*       Albumin                     2.5 (L)           3.2 - 4.6 g/*       Globulin                    5.0 (H)           2.3 - 3.5 g/*       A-G Ratio                   0.5 (L)           1.2 - 3.5      -URINALYSIS W/ RFLX MICROSCOPIC       Result                      Value             Ref Range           Color                       YELLOW                                Appearance                  TURBID                                Specific gravity            1.013             1.001 - 1.02*       pH (UA)                     8.0               5.0 - 9.0           Protein                     100 (A)           NEG mg/dL           Glucose                     Negative          mg/dL               Ketone                      Negative          NEG mg/dL           Bilirubin                   Negative          NEG                 Blood                       LARGE (A)         NEG                 Urobilinogen                1.0               0.2 - 1.0 EU*       Nitrites                    Negative          NEG                 Leukocyte Esterase          LARGE (A)         NEG                 WBC                         >100              0 /hpf              RBC                         20-50             0 /hpf              Epithelial cells            0-3               0 /hpf              Bacteria                    4+ (H)            0 /hpf              Yeast                       OCCASIONAL                            Other observations                                            RESULTS VERIFIED MANUALLY  -LACTIC ACID       Result                      Value             Ref Range           Lactic acid                 1.0               0.4 - 2.0 MM*  )  Tests in the radiology section of CPT®: ordered and reviewed (Xr Chest Port    Result Date: 8/2/2020  EXAM: Single view chest radiograph. INDICATION: Altered mental status. COMPARISON: None. FINDINGS: Hypoventilatory exam with bronchovascular crowding and bibasilar atelectasis. Cardiomegaly evident. Sternal wires appear intact. Mediastinal surgical clips evident. Atherosclerotic aortic arch. Advanced right shoulder joint degenerative changes. IMPRESSION: 1. Hypoventilatory exam with bronchovascular crowding and bibasilar atelectasis. 2. Cardiomegaly status post median sternotomy.     )  Decide to obtain previous medical records or to obtain history from someone other than the patient: yes  Discuss the patient with other providers: yes (Discussed the case with the hospitalist service will assume care)  Independent visualization of images, tracings, or specimens: yes (6 personally reviewed the patient's imaging)    Risk of Complications, Morbidity, and/or Mortality  Presenting problems: high  Diagnostic procedures: high  Management options: high  General comments: I personally reviewed the patient's vital signs, laboratory tests, and/or radiological findings. I discussed these findings with the patient and their significance. I answered all questions and explained that given these findings there is significant concern for increased morbidity and/or mortality without immediate intervention. As a result, I recommended admission to the hospital, consulted the appropriate service, and transitioned care to that service in improved condition      Patient Progress  Patient progress: improved    ED Course as of Aug 02 1528   Sun Aug 02, 2020   1431 Nurse reported that the patient's urine appeared to be the source of her symptoms. I am ordering cefepime as I am also concerned the patient may have an underlying pneumonia. [JS]   0282 Patient appears to have dehydration, elevated BUN creatinine, and urinary tract infection. Treating with fluids and cefepime. [JS]   9075 [id number] the hospitalist service for admission.     [JS]      ED Course User Index  [JS] Teo Diaz MD       Procedures

## 2020-08-02 NOTE — PROGRESS NOTES
08/02/20 1700   Skin Integumentary   Skin Integumentary (WDL) X    Pressure  Injury Documentation No Pressure Injury Noted-Pressure Ulcer Prevention Initiated   Skin Color Appropriate for ethnicity   Skin Condition/Temp Dry; Warm   Skin Integrity Scars (comment); Other (comment)  (toes on Rfoot are amputated)   Turgor Non-tenting   Hair Growth Present   Varicosities Absent   Primary Nurse Connor Strauss RN and Delicia Addison RN performed a dual skin assessment on this patient Impairment noted- see wound doc flow sheet  Christopher score is 12

## 2020-08-03 LAB
ANION GAP SERPL CALC-SCNC: 10 MMOL/L (ref 7–16)
BASOPHILS # BLD: 0 K/UL (ref 0–0.2)
BASOPHILS NFR BLD: 0 % (ref 0–2)
BUN SERPL-MCNC: 40 MG/DL (ref 8–23)
CALCIUM SERPL-MCNC: 8.4 MG/DL (ref 8.3–10.4)
CHLORIDE SERPL-SCNC: 118 MMOL/L (ref 98–107)
CO2 SERPL-SCNC: 23 MMOL/L (ref 21–32)
COVID-19 RAPID TEST, COVR: DETECTED
CREAT SERPL-MCNC: 1.41 MG/DL (ref 0.6–1)
DIFFERENTIAL METHOD BLD: ABNORMAL
EOSINOPHIL # BLD: 0 K/UL (ref 0–0.8)
EOSINOPHIL NFR BLD: 0 % (ref 0.5–7.8)
ERYTHROCYTE [DISTWIDTH] IN BLOOD BY AUTOMATED COUNT: 13.2 % (ref 11.9–14.6)
GLUCOSE BLD STRIP.AUTO-MCNC: 212 MG/DL (ref 65–100)
GLUCOSE BLD STRIP.AUTO-MCNC: 217 MG/DL (ref 65–100)
GLUCOSE BLD STRIP.AUTO-MCNC: 259 MG/DL (ref 65–100)
GLUCOSE BLD STRIP.AUTO-MCNC: 263 MG/DL (ref 65–100)
GLUCOSE SERPL-MCNC: 239 MG/DL (ref 65–100)
HCT VFR BLD AUTO: 29.7 % (ref 35.8–46.3)
HGB BLD-MCNC: 9.6 G/DL (ref 11.7–15.4)
IMM GRANULOCYTES # BLD AUTO: 0.1 K/UL (ref 0–0.5)
IMM GRANULOCYTES NFR BLD AUTO: 1 % (ref 0–5)
LYMPHOCYTES # BLD: 0.9 K/UL (ref 0.5–4.6)
LYMPHOCYTES NFR BLD: 19 % (ref 13–44)
MCH RBC QN AUTO: 28.2 PG (ref 26.1–32.9)
MCHC RBC AUTO-ENTMCNC: 32.3 G/DL (ref 31.4–35)
MCV RBC AUTO: 87.4 FL (ref 79.6–97.8)
MONOCYTES # BLD: 0.3 K/UL (ref 0.1–1.3)
MONOCYTES NFR BLD: 6 % (ref 4–12)
NEUTS SEG # BLD: 3.6 K/UL (ref 1.7–8.2)
NEUTS SEG NFR BLD: 74 % (ref 43–78)
NRBC # BLD: 0 K/UL (ref 0–0.2)
PLATELET # BLD AUTO: 120 K/UL (ref 150–450)
PLATELET COMMENTS,PCOM: SLIGHT
PMV BLD AUTO: 12.9 FL (ref 9.4–12.3)
POTASSIUM SERPL-SCNC: 3.6 MMOL/L (ref 3.5–5.1)
RBC # BLD AUTO: 3.4 M/UL (ref 4.05–5.2)
RBC MORPH BLD: ABNORMAL
SODIUM SERPL-SCNC: 151 MMOL/L (ref 136–145)
SOURCE, COVRS: ABNORMAL
WBC # BLD AUTO: 4.9 K/UL (ref 4.3–11.1)
WBC MORPH BLD: ABNORMAL

## 2020-08-03 PROCEDURE — 74011250636 HC RX REV CODE- 250/636: Performed by: INTERNAL MEDICINE

## 2020-08-03 PROCEDURE — 87635 SARS-COV-2 COVID-19 AMP PRB: CPT

## 2020-08-03 PROCEDURE — 80048 BASIC METABOLIC PNL TOTAL CA: CPT

## 2020-08-03 PROCEDURE — 97161 PT EVAL LOW COMPLEX 20 MIN: CPT

## 2020-08-03 PROCEDURE — 74011636637 HC RX REV CODE- 636/637: Performed by: INTERNAL MEDICINE

## 2020-08-03 PROCEDURE — 82962 GLUCOSE BLOOD TEST: CPT

## 2020-08-03 PROCEDURE — 97165 OT EVAL LOW COMPLEX 30 MIN: CPT

## 2020-08-03 PROCEDURE — 65660000000 HC RM CCU STEPDOWN

## 2020-08-03 PROCEDURE — 74011000250 HC RX REV CODE- 250: Performed by: INTERNAL MEDICINE

## 2020-08-03 PROCEDURE — 74011250637 HC RX REV CODE- 250/637: Performed by: INTERNAL MEDICINE

## 2020-08-03 PROCEDURE — 85025 COMPLETE CBC W/AUTO DIFF WBC: CPT

## 2020-08-03 PROCEDURE — 36415 COLL VENOUS BLD VENIPUNCTURE: CPT

## 2020-08-03 PROCEDURE — 74011000258 HC RX REV CODE- 258: Performed by: INTERNAL MEDICINE

## 2020-08-03 PROCEDURE — 92610 EVALUATE SWALLOWING FUNCTION: CPT

## 2020-08-03 PROCEDURE — 97530 THERAPEUTIC ACTIVITIES: CPT

## 2020-08-03 RX ORDER — INSULIN GLARGINE 100 [IU]/ML
20 INJECTION, SOLUTION SUBCUTANEOUS
Status: DISCONTINUED | OUTPATIENT
Start: 2020-08-03 | End: 2020-08-07 | Stop reason: HOSPADM

## 2020-08-03 RX ORDER — ASCORBIC ACID 500 MG
1000 TABLET ORAL DAILY
Status: DISCONTINUED | OUTPATIENT
Start: 2020-08-04 | End: 2020-08-03

## 2020-08-03 RX ORDER — UREA 10 %
220 LOTION (ML) TOPICAL DAILY
Status: DISCONTINUED | OUTPATIENT
Start: 2020-08-04 | End: 2020-08-03

## 2020-08-03 RX ORDER — DEXTROSE MONOHYDRATE 50 MG/ML
75 INJECTION, SOLUTION INTRAVENOUS CONTINUOUS
Status: DISCONTINUED | OUTPATIENT
Start: 2020-08-03 | End: 2020-08-05

## 2020-08-03 RX ADMIN — CEFTRIAXONE SODIUM 1 G: 1 INJECTION, POWDER, FOR SOLUTION INTRAMUSCULAR; INTRAVENOUS at 19:38

## 2020-08-03 RX ADMIN — Medication 10 ML: at 19:37

## 2020-08-03 RX ADMIN — HEPARIN SODIUM 5000 UNITS: 5000 INJECTION INTRAVENOUS; SUBCUTANEOUS at 21:09

## 2020-08-03 RX ADMIN — INSULIN LISPRO 4 UNITS: 100 INJECTION, SOLUTION INTRAVENOUS; SUBCUTANEOUS at 07:30

## 2020-08-03 RX ADMIN — INSULIN LISPRO 4 UNITS: 100 INJECTION, SOLUTION INTRAVENOUS; SUBCUTANEOUS at 21:08

## 2020-08-03 RX ADMIN — HEPARIN SODIUM 5000 UNITS: 5000 INJECTION INTRAVENOUS; SUBCUTANEOUS at 05:20

## 2020-08-03 RX ADMIN — DEXTROSE MONOHYDRATE AND SODIUM CHLORIDE 125 ML/HR: 5; .45 INJECTION, SOLUTION INTRAVENOUS at 03:03

## 2020-08-03 RX ADMIN — DEXTROSE MONOHYDRATE 100 ML/HR: 5 INJECTION, SOLUTION INTRAVENOUS at 08:48

## 2020-08-03 RX ADMIN — HEPARIN SODIUM 5000 UNITS: 5000 INJECTION INTRAVENOUS; SUBCUTANEOUS at 14:27

## 2020-08-03 RX ADMIN — DEXTROSE MONOHYDRATE 75 ML/HR: 5 INJECTION, SOLUTION INTRAVENOUS at 21:18

## 2020-08-03 RX ADMIN — INSULIN LISPRO 4 UNITS: 100 INJECTION, SOLUTION INTRAVENOUS; SUBCUTANEOUS at 16:41

## 2020-08-03 RX ADMIN — ASCORBIC ACID 1.5 G: 500 INJECTION, SOLUTION INTRAMUSCULAR; INTRAVENOUS; SUBCUTANEOUS at 16:41

## 2020-08-03 RX ADMIN — ACETAMINOPHEN 650 MG: 325 TABLET, FILM COATED ORAL at 03:52

## 2020-08-03 RX ADMIN — HYDRALAZINE HYDROCHLORIDE 20 MG: 20 INJECTION INTRAMUSCULAR; INTRAVENOUS at 12:58

## 2020-08-03 RX ADMIN — Medication 10 ML: at 14:27

## 2020-08-03 RX ADMIN — Medication 10 ML: at 05:25

## 2020-08-03 RX ADMIN — INSULIN LISPRO 6 UNITS: 100 INJECTION, SOLUTION INTRAVENOUS; SUBCUTANEOUS at 11:50

## 2020-08-03 NOTE — PROGRESS NOTES
Rapid COVID positive. Transfer to 5th. Informed SLP and ER provider who had exposure. Resume heparin for DVT ppx. More concerned about DVT than mild hematuria    Not hypoxic, no resp distress. Remdesivir, decadron, and plasma not recommended at this time by expert reviewers. Reduce IVF to 75/hr. Monitor volume. Is NPO so needs some. Get baseline CRP in AM.  Would check daily. If it starts rising ~20 or more, would give plasma/decadron. Stop home diclofenac (nsaid)    May need PC consult if swallowing issues persist.  Would give it a few days to see if it is UTI related. CT head negative and given duration of symptoms for a week or so per family, doubt CVA at this time or it should show up on CT.      SARS-CoV-2 Lab Results  \"Novel Coronavirus\" Test: No results found for: COV2NT   \"Emergent Disease\" Test: No results found for: EDPR  \"SARS-COV-2\" Test: No results found for: XGCOVT  \"[facility]\" Test: No results found for: RSLT  Rapid Test:   Lab Results   Component Value Date/Time    COVR Detected (A) 08/03/2020 09:26 AM

## 2020-08-03 NOTE — PROGRESS NOTES
Reviewed notes prior to visit for spiritual concerns  Will continue to follow as needed during this admission        Hosea Yap, staff

## 2020-08-03 NOTE — PROGRESS NOTES
26 Dr. Cele Krishna notified of patient's daughter's questions about plan of care. MD called daughter. Orders received. 1450 Patient found in room BUE shaking. VSS assessed. O2 sat 84% on room air.  sinus tach. Other vitals stable. Patient placed on 2 L NC. O2 sat increased to 95%  at this time. Shaking noted to BUE. Dr. Cele Krishna notified via [employer] MD stated to set up suctioning in case of aspiration. Will set up suctioning. Patient HOB 60 at this time. [place] absent at this time. Patient is awake resting in bed. Respirations present. On 2 L NC. No signs of distress. Will continue to monitor.

## 2020-08-03 NOTE — PROGRESS NOTES
Called [phone] to give report to [facility] RN. Staff verbalized that the nurse will call me back in about 5 minutes.

## 2020-08-03 NOTE — PROGRESS NOTES
Problem: Mobility Impaired (Adult and Pediatric)  Goal: *Acute Goals and Plan of Care (Insert Text)  Outcome: Progressing Towards Goal  Note: LTG:  (1.)Ms. Jorge Martines will move from supine to sit and sit to supine , scoot up and down, and roll side to side in bed with MINIMAL ASSIST within 7 treatment day(s). (2.)Ms. Jorge Martines will transfer from bed to chair and chair to bed with MODERATE ASSIST using the least restrictive device within 7 treatment day(s). (3.)Ms. Jorge Martines will maintain static/dynamic sitting x 15 minutes with SUPERVISION for improved balance within 7 treatment days. (4.)Ms. Jorge Martines will follow 50% commands for increased participation in therapeutic activity/exercises within 7 treatment days. ________________________________________________________________________________________________       PHYSICAL THERAPY: Initial Assessment and PM 8/3/2020  INPATIENT: PT Visit Days : 1  [place] / [place] / Product Type: Medicare /       NAME/AGE/GENDER: Dajuan Greene is a 81 y.o. female   PRIMARY DIAGNOSIS: Acute metabolic encephalopathy [N31.72]  UTI (urinary tract infection) [N39.0]  KAILEY (acute kidney injury) ([facility] [address] [N17.9]  Hypernatremia [E87.0] KAILEY (acute kidney injury) ([facility] [address] KAILEY (acute kidney injury) ([facility] [address]        ICD-10: Treatment Diagnosis:    Generalized Muscle Weakness (M62.81)  Difficulty in walking, Not elsewhere classified (R26.2)   Precaution/Allergies:  Patient has no known allergies. ASSESSMENT:     Ms. Jorge Martines is a 81 y.o. female in the hospital for the above who was supine in bed upon arrival.   Ms. Jorge Martines presents to PT with generally decreased AROM and grossly decreased strength in B LEs (L>R). Pt also presents with increased L LE knee extensor tone and decreased B LE coordination. She appears to have had a PSH including L transmetatarsal amputation. Pt unable to verbalized and was shivering throughout most of the session.   She required maxA for rolling and supine to sit <>.  Pt also demonstrated poor sitting balance and was unable to tolerate for long. Pt assisted back to supine and required totalA for scooting up in bed. Given pt's PLOF unknown PT will trial pt on caseload for 5 treatment sessions to assess if progress is possible. Pt would make and excellent co-treat with OT. This section established at most recent assessment   PROBLEM LIST (Impairments causing functional limitations):  Decreased Strength  Decreased ADL/Functional Activities  Decreased Transfer Abilities  Decreased Ambulation Ability/Technique  Decreased Balance  Decreased Activity Tolerance  Decreased Flexibility/Joint Mobility  Decreased Cognition   INTERVENTIONS PLANNED: (Benefits and precautions of physical therapy have been discussed with the patient.)  Balance Exercise  Bed Mobility  Family Education  Gait Training  Home Exercise Program (HEP)  Neuromuscular Re-education/Strengthening  Therapeutic Activites  Therapeutic Exercise/Strengthening  Transfer Training     TREATMENT PLAN: Frequency/Duration: 3 times a week for 5 treatment days trial period  Rehabilitation Potential For Stated Goals: [facility] (at time of discharge pending progress):    Placement: It is my opinion, based on this patient's performance to date, that Ms. Indigo Cannon may benefit from intensive therapy at a [address] [place] [address] after discharge due to the functional deficits listed above that are likely to improve with skilled rehabilitation and concerns that he/she may be unsafe to be unsupervised at home due to decreased balance and functional mobility . Equipment:   None at this time              HISTORY:   History of Present Injury/Illness (Reason for Referral):   KAILEY  Past Medical History/Comorbidities:   Ms. 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 Ms. Iraj Rawls  has a past surgical history that includes hx coronary artery bypass graft (2005); hx knee arthroscopy (Bilateral); hx amputation (Left); and hx partial hysterectomy. Social History/Living Environment:   Home Environment: Skilled nursing facility  Prior Level of Function/Work/Activity:  Lives at SNF per chart. PLOF unknown given pt AMS. Number of Personal Factors/Comorbidities that affect the Plan of Care: 1-2: MODERATE COMPLEXITY   EXAMINATION:   Most Recent Physical Functioning:   Gross Assessment:  AROM: Generally decreased, functional(L > R)  Strength: Grossly decreased, non-functional  Coordination: Grossly decreased, non-functional  Tone: Abnormal(increased L LE knee extensor tone)               Posture:     Balance:  Sitting: Impaired  Sitting - Static: Poor (constant support)  Sitting - Dynamic: Poor (constant support) Bed Mobility:  Rolling: Maximum assistance  Supine to Sit: Maximum assistance  Sit to Supine: Maximum assistance  Scooting: Total assistance  Wheelchair Mobility:     Transfers:     Gait:            Body Structures Involved:  Lungs  Metabolic  Endocrine  Muscles Body Functions Affected:  Respiratory  Neuromusculoskeletal  Movement Related  Metobolic/Endocrine Activities and Participation Affected:  Learning and Applying Knowledge  General Tasks and Demands  Communication  Mobility  Self Care  Domestic Life  Interpersonal Interactions and Relationships  Community, Social and [place]   Number of elements that affect the Plan of Care: 4+: HIGH COMPLEXITY   CLINICAL PRESENTATION:   Presentation: Stable and uncomplicated: LOW COMPLEXITY   CLINICAL DECISION MAKIN [address] Short Form  How much difficulty does the patient currently have. .. Unable A Lot A Little None   1. Turning over in bed (including adjusting bedclothes, sheets and blankets)? [] 1   [x] 2   [] 3   [] 4   2.   Sitting down on and standing up from a chair with arms ( e.g., wheelchair, bedside commode, etc.)   [x] 1   [] 2   [] 3   [] 4   3. Moving from lying on back to sitting on the side of the bed? [] 1   [x] 2   [] 3   [] 4   How much help from another person does the patient currently need. .. Total A Lot A Little None   4. Moving to and from a bed to a chair (including a wheelchair)? [x] 1   [] 2   [] 3   [] 4   5. Need to walk in hospital room? [x] 1   [] 2   [] 3   [] 4   6. Climbing 3-5 steps with a railing? [x] 1   [] 2   [] 3   [] 4   © 2007, Trustees of [facility] [employer], under license to [employer]. All rights reserved      Score:  Initial: 8 Most Recent: X (Date: -- )    Interpretation of Tool:  Represents activities that are increasingly more difficult (i.e. Bed mobility, Transfers, Gait). Medical Necessity:     Patient demonstrates   fair   rehab potential due to higher previous functional level. Reason for Services/Other Comments:  Patient continues to require skilled intervention due to   Decreased mobility and balance  . Use of outcome tool(s) and clinical judgement create a POC that gives a: Clear prediction of patient's progress: LOW COMPLEXITY            TREATMENT:   (In addition to Assessment/Re-Assessment sessions the following treatments were rendered)   Pre-treatment Symptoms/Complaints:  Unable to verbalize  Pain: Initial:   Pain Intensity 1: 1  Post Session:  0     Therapeutic Activity: (    8 minutes): Therapeutic activities including rolling, supine to sit <>, and sitting EOB activities to improve mobility, strength, balance, and coordination. Required maximal   to promote static and dynamic balance in sitting and promote coordination of bilateral, upper extremity(s), lower extremity(s).          Braces/Orthotics/Lines/Etc:   IV  pereyra catheter  O2 Device: Nasal cannula  Treatment/Session Assessment:    Response to Treatment:  Fairly given pt appeared to have increased shivering with mobility. Interdisciplinary Collaboration:   Physical Therapist  Registered Nurse  After treatment position/precautions:   Supine in bed  Bed/Chair-wheels locked  Bed in low position  RN notified  Side rails x 3   Compliance with Program/Exercises: Will assess as treatment progresses  Recommendations/Intent for next treatment session: \"Next visit will focus on advancements to more challenging activities and reduction in assistance provided\".   Total Treatment Duration:  PT Patient Time In/Time Out  Time In: 1410  Time Out: [id number] Sourav Earl PT, DPT

## 2020-08-03 NOTE — PROGRESS NOTES
Hospitalist Note     Admit Date:  2020  2:03 PM   Name:  Kyle Edwards   Age:  81 y.o.  :  1935   MRN:  [id number]   PCP:  Hiram Sexton MD  Treatment Team: Attending Provider: Alphonso Guevara MD; Physical Therapist: Leanna Garcia PT; Speech Language Pathologist: Mannie Boeck; Utilization Review: Graeme Villegas RN; Occupational Therapist: Celia Irving OT    HPI/Subjective:   Pt is an 80 y/o F resident of [facility] with DM, HTN, who presented to ER with AMS. History limited to chart info due to confusion. Unclear baseline mental status but dementia documented in chart. She denies pain or complaints but cannot give focused history and has a hard time focusing on me; eyes wander. Staff at [facility] reported she was less responsive so sent her here. She was found to have UTI, KAILEY, hyperNa on lab workup. Urine was brown and cloudy. Started on IVF, rocephin. 8/3 - fever early AM.  Hematuria noted from pereyra trauma and UTI. More alert today but still takes some effort to get her to respond. Denies complaints. Was told by nursing yesterday afternoon that family reported she has been less responsive for at least two weeks. CT head no infarct.       Dementia limits ROS  Objective:     Patient Vitals for the past 24 hrs:   Temp Pulse Resp BP SpO2   20 0710 100 °F (37.8 °C) 77 18 147/67 94 %   20 0512 99.4 °F (37.4 °C)       20 0333 (!) 100.6 °F (38.1 °C) 90 16 156/89 92 %   20 0012 99.1 °F (37.3 °C) 83 16 179/67 93 %   20 1919 97.9 °F (36.6 °C) 72 16 (!) 142/98 93 %   20 1702 98 °F (36.7 °C) 72 18 149/82 94 %   20 1601  68 19 140/61 96 %   20 1530  66 16 130/62 93 %   20 1509  65 14 128/60 93 %   20 1428 99.9 °F (37.7 °C)       20 1408  64  127/52 92 %   20 1405 97.7 °F (36.5 °C) 63 14 127/51 92 %     Oxygen Therapy  O2 Sat (%): 94 % (20 0710)  Pulse via Oximetry: 68 beats per minute (08/02/20 1601)  O2 Device: Room air (08/02/20 1405)    Estimated body mass index is 34.95 kg/m² as calculated from the following:    Height as of this encounter: 5' 5\" (1.651 m). Weight as of this encounter: 95.3 kg (210 lb). Intake/Output Summary (Last 24 hours) at 8/3/2020 0850  Last data filed at 8/3/2020 0803  Gross per 24 hour   Intake 0 ml   Output 1800 ml   Net -1800 ml       *Note that automatically entered I/Os may not be accurate; dependent on patient compliance with collection and accurate  by assistants. Physical Exam:  General:    Well nourished. confused. Eyes:   Normal sclerae. Extraocular movements intact. HENT:  Normocephalic, atraumatic. Moist mucous membranes  Resp:  No distress, unlabored  Abdomen: Soft, nontender, nondistended. Extremities: Warm and dry. No cyanosis or edema. Neurologic: CN II-XII grossly intact. Sensation intact. Skin:     No rashes or jaundice. Normal coloration  Psych:   Cannot assess    I reviewed the labs, imaging, EKGs, telemetry, and other studies done this admission. Data Reviewed:   Recent Results (from the past 24 hour(s))   CBC WITH AUTOMATED DIFF    Collection Time: 08/02/20  2:27 PM   Result Value Ref Range    WBC 6.1 4.3 - 11.1 K/uL    RBC 3.71 (L) 4.05 - 5.2 M/uL    HGB 10.5 (L) 11.7 - 15.4 g/dL    HCT 33.5 (L) 35.8 - 46.3 %    MCV 90.3 79.6 - 97.8 FL    MCH 28.3 26.1 - 32.9 PG    MCHC 31.3 (L) 31.4 - 35.0 g/dL    RDW 13.6 11.9 - 14.6 %    PLATELET 968 (L) 492 - 450 K/uL    MPV 13.4 (H) 9.4 - 12.3 FL    ABSOLUTE NRBC 0.00 0.0 - 0.2 K/uL    NEUTROPHILS 62 43 - 78 %    LYMPHOCYTES 29 13 - 44 %    MONOCYTES 8 4.0 - 12.0 %    EOSINOPHILS 0 (L) 0.5 - 7.8 %    BASOPHILS 0 0.0 - 2.0 %    IMMATURE GRANULOCYTES 1 0.0 - 5.0 %    ABS. NEUTROPHILS 3.7 1.7 - 8.2 K/UL    ABS. LYMPHOCYTES 1.8 0.5 - 4.6 K/UL    ABS. MONOCYTES 0.5 0.1 - 1.3 K/UL    ABS. EOSINOPHILS 0.0 0.0 - 0.8 K/UL    ABS. BASOPHILS 0.0 0.0 - 0.2 K/UL    ABS. IMM. Lucille Romberg. 0.1 0.0 - 0.5 K/UL    DF AUTOMATED     LIPASE    Collection Time: 08/02/20  2:27 PM   Result Value Ref Range    Lipase 299 73 - 930 U/L   METABOLIC PANEL, COMPREHENSIVE    Collection Time: 08/02/20  2:27 PM   Result Value Ref Range    Sodium 150 (H) 136 - 145 mmol/L    Potassium 4.4 3.5 - 5.1 mmol/L    Chloride 118 (H) 98 - 107 mmol/L    CO2 25 21 - 32 mmol/L    Anion gap 7 7 - 16 mmol/L    Glucose 95 65 - 100 mg/dL    BUN 56 (H) 8 - 23 MG/DL    Creatinine 2.00 (H) 0.6 - 1.0 MG/DL    GFR est AA 31 (L) >60 ml/min/1.73m2    GFR est non-AA 25 (L) >60 ml/min/1.73m2    Calcium 9.0 8.3 - 10.4 MG/DL    Bilirubin, total 0.3 0.2 - 1.1 MG/DL    ALT (SGPT) 17 12 - 65 U/L    AST (SGOT) 22 15 - 37 U/L    Alk.  phosphatase 101 50 - 136 U/L    Protein, total 7.5 6.3 - 8.2 g/dL    Albumin 2.5 (L) 3.2 - 4.6 g/dL    Globulin 5.0 (H) 2.3 - 3.5 g/dL    A-G Ratio 0.5 (L) 1.2 - 3.5     URINALYSIS W/ RFLX MICROSCOPIC    Collection Time: 08/02/20  2:27 PM   Result Value Ref Range    Color YELLOW      Appearance TURBID      Specific gravity 1.013 1.001 - 1.023      pH (UA) 8.0 5.0 - 9.0      Protein 100 (A) NEG mg/dL    Glucose Negative mg/dL    Ketone Negative NEG mg/dL    Bilirubin Negative NEG      Blood LARGE (A) NEG      Urobilinogen 1.0 0.2 - 1.0 EU/dL    Nitrites Negative NEG      Leukocyte Esterase LARGE (A) NEG      WBC >100 0 /hpf    RBC 20-50 0 /hpf    Epithelial cells 0-3 0 /hpf    Bacteria 4+ (H) 0 /hpf    Yeast OCCASIONAL      Other observations RESULTS VERIFIED MANUALLY     CULTURE, BLOOD    Collection Time: 08/02/20  2:27 PM    Specimen: Blood   Result Value Ref Range    Special Requests: LEFT  Antecubital        Culture result: NO GROWTH AFTER 17 HOURS     LACTIC ACID    Collection Time: 08/02/20  2:27 PM   Result Value Ref Range    Lactic acid 1.0 0.4 - 2.0 MMOL/L   CULTURE, URINE    Collection Time: 08/02/20  2:27 PM    Specimen: Cath Urine   Result Value Ref Range    Special Requests: NO SPECIAL REQUESTS Culture result:        NO GROWTH AFTER SHORT PERIOD OF INCUBATION. FURTHER RESULTS TO FOLLOW AFTER OVERNIGHT INCUBATION.    CULTURE, BLOOD    Collection Time: 08/02/20  2:30 PM    Specimen: Blood   Result Value Ref Range    Special Requests: RIGHT  FOREARM        Culture result: NO GROWTH AFTER 17 HOURS     GLUCOSE, POC    Collection Time: 08/02/20  5:01 PM   Result Value Ref Range    Glucose (POC) 90 65 - 100 mg/dL   GLUCOSE, POC    Collection Time: 08/02/20  8:24 PM   Result Value Ref Range    Glucose (POC) 108 (H) 65 - 386 mg/dL   METABOLIC PANEL, BASIC    Collection Time: 08/03/20  5:28 AM   Result Value Ref Range    Sodium 151 (H) 136 - 145 mmol/L    Potassium 3.6 3.5 - 5.1 mmol/L    Chloride 118 (H) 98 - 107 mmol/L    CO2 23 21 - 32 mmol/L    Anion gap 10 7 - 16 mmol/L    Glucose 239 (H) 65 - 100 mg/dL    BUN 40 (H) 8 - 23 MG/DL    Creatinine 1.41 (H) 0.6 - 1.0 MG/DL    GFR est AA 46 (L) >60 ml/min/1.73m2    GFR est non-AA 38 (L) >60 ml/min/1.73m2    Calcium 8.4 8.3 - 10.4 MG/DL   CBC WITH AUTOMATED DIFF    Collection Time: 08/03/20  5:28 AM   Result Value Ref Range    WBC 4.9 4.3 - 11.1 K/uL    RBC 3.40 (L) 4.05 - 5.2 M/uL    HGB 9.6 (L) 11.7 - 15.4 g/dL    HCT 29.7 (L) 35.8 - 46.3 %    MCV 87.4 79.6 - 97.8 FL    MCH 28.2 26.1 - 32.9 PG    MCHC 32.3 31.4 - 35.0 g/dL    RDW 13.2 11.9 - 14.6 %    PLATELET 521 (L) 781 - 450 K/uL    MPV 12.9 (H) 9.4 - 12.3 FL    ABSOLUTE NRBC 0.00 0.0 - 0.2 K/uL    DF PENDING    GLUCOSE, POC    Collection Time: 08/03/20  7:10 AM   Result Value Ref Range    Glucose (POC) 263 (H) 65 - 100 mg/dL       All Micro Results     Procedure Component Value Units Date/Time    CULTURE, BLOOD [[id number]] Collected:  08/02/20 1427    Order Status:  Completed Specimen:  Blood Updated:  08/03/20 0811     Special Requests: --        LEFT  Antecubital       Culture result: NO GROWTH AFTER 17 HOURS       CULTURE, BLOOD [[id number]] Collected:  08/02/20 1430    Order Status:  Completed Specimen:  Blood Updated:  08/03/20 0811     Special Requests: --        RIGHT  FOREARM       Culture result: NO GROWTH AFTER 17 HOURS       CULTURE, URINE [[id number]] Collected:  08/02/20 1427    Order Status:  Completed Specimen:  Cath Urine Updated:  08/03/20 0729     Special Requests: NO SPECIAL REQUESTS        Culture result:       NO GROWTH AFTER SHORT PERIOD OF INCUBATION. FURTHER RESULTS TO FOLLOW AFTER OVERNIGHT INCUBATION.           CULTURE, URINE [[id number]]     Order Status:  Canceled Specimen:  Cath Urine           Current Facility-Administered Medications   Medication Dose Route Frequency    dextrose 5% infusion  100 mL/hr IntraVENous CONTINUOUS    insulin glargine (LANTUS) injection 20 Units  20 Units SubCUTAneous QHS    rosuvastatin (CRESTOR) tablet 20 mg  20 mg Oral QHS    diclofenac EC (VOLTAREN) tablet 75 mg (Patient Supplied)  75 mg Oral BID    insulin lispro (HUMALOG) injection   SubCUTAneous AC&HS    dextrose 40% (GLUTOSE) oral gel 1 Tube  15 g Oral PRN    glucagon (GLUCAGEN) injection 1 mg  1 mg IntraMUSCular PRN    dextrose (D50W) injection syrg 12.5-25 g  25-50 mL IntraVENous PRN    sodium chloride (NS) flush 5-40 mL  5-40 mL IntraVENous Q8H    sodium chloride (NS) flush 5-40 mL  5-40 mL IntraVENous PRN    acetaminophen (TYLENOL) tablet 650 mg  650 mg Oral Q6H PRN    Or    acetaminophen (TYLENOL) suppository 650 mg  650 mg Rectal Q6H PRN    polyethylene glycol (MIRALAX) packet 17 g  17 g Oral DAILY PRN    bisacodyL (DULCOLAX) suppository 10 mg  10 mg Rectal DAILY PRN    ondansetron (ZOFRAN ODT) tablet 4 mg  4 mg Oral Q8H PRN    Or    ondansetron (ZOFRAN) injection 4 mg  4 mg IntraVENous Q6H PRN    famotidine (PEPCID) tablet 20 mg  20 mg Oral BID PRN    potassium chloride (KLOR-CON) tablet 40 mEq  40 mEq Oral PRN    Or    potassium bicarb-citric acid (EFFER-K) tablet 40 mEq  40 mEq Oral PRN    Or    potassium chloride 10 mEq in 100 ml IVPB  10 mEq IntraVENous PRN    potassium chloride 10 mEq in 100 ml IVPB  10 mEq IntraVENous PRN    magnesium sulfate 2 g/50 ml IVPB (premix or compounded)  2 g IntraVENous PRN    heparin (porcine) injection 5,000 Units  5,000 Units SubCUTAneous Q8H    guaiFENesin ER (MUCINEX) tablet 1,200 mg  1,200 mg Oral BID PRN    hydrALAZINE (APRESOLINE) 20 mg/mL injection 20 mg  20 mg IntraVENous Q4H PRN    alum-mag hydroxide-simeth (MYLANTA) oral suspension 30 mL  30 mL Oral Q4H PRN    oxyCODONE IR (ROXICODONE) tablet 5 mg  5 mg Oral Q4H PRN    oxyCODONE IR (ROXICODONE) tablet 10 mg  10 mg Oral Q4H PRN    zolpidem (AMBIEN) tablet 5 mg  5 mg Oral QHS PRN    guaiFENesin-dextromethorphan (ROBITUSSIN DM) 100-10 mg/5 mL syrup 10 mL  10 mL Oral Q4H PRN    senna-docusate (PERICOLACE) 8.6-50 mg per tablet 2 Tab  2 Tab Oral DAILY PRN    hydrOXYzine pamoate (VISTARIL) capsule 50 mg  50 mg Oral QID PRN    LORazepam (ATIVAN) tablet 0.5 mg  0.5 mg Oral Q4H PRN    mirtazapine (REMERON) tablet 15 mg  15 mg Oral QHS PRN    cefTRIAXone (ROCEPHIN) 1 g in 0.9% sodium chloride (MBP/ADV) 50 mL  1 g IntraVENous Q24H       Other Studies:  No results found for this visit on 08/02/20. Ct Head Wo Cont    Result Date: 8/2/2020  Exam: CT head without contrast. Indication: Altered mental status. Comparison: None. Multiple axial images obtained through the brain without intravenous contrast. Radiation dose reduction techniques were used for this study: All CT scans performed at this facility use one or all of the following: Automated exposure control, adjustment of the mA and/or kVp according to patient's size, iterative reconstruction. FINDINGS: Prominent chronic mineralization of the dentate nuclei of the cerebellum and of the basal ganglia. Diffuse cerebral atrophy with commensurate ex vacuo dilatation of the ventricles. Scattered periventricular and centrum semiovale white matter hypointensities most indicative of chronic ischemic white matter change.  No evidence of intracranial mass, hemorrhage, or large territorial infarct. The basal cisterns are patent. No extra-axial fluid collection or mass effect. Left lens replacement. The paranasal sinuses are clear. The mastoid air cells and middle ears are clear. No significant osseous or extracranial soft tissue lesions. IMPRESSION: 1. No evidence of acute intracranial abnormality. 2. Prominent chronic mineralization of the basal ganglia and dentate nuclei likely senescent change however recommend correlation for possible hyperparathyroidism or pseudohyperparathyroidism. Xr Chest Port    Result Date: 8/2/2020  EXAM: Single view chest radiograph. INDICATION: Altered mental status. COMPARISON: None. FINDINGS: Hypoventilatory exam with bronchovascular crowding and bibasilar atelectasis. Cardiomegaly evident. Sternal wires appear intact. Mediastinal surgical clips evident. Atherosclerotic aortic arch. Advanced right shoulder joint degenerative changes. IMPRESSION: 1. Hypoventilatory exam with bronchovascular crowding and bibasilar atelectasis. 2. Cardiomegaly status post median sternotomy.        SARS-CoV-2 Lab Results  \"Novel Coronavirus\" Test: No results found for: COV2NT   \"Emergent Disease\" Test: No results found for: EDPR  \"SARS-COV-2\" Test: No results found for: XGCOVT  \"Precision Labs\" Test: No results found for: RSLT  Rapid Test: No results found for: COVR           Assessment and Plan:     Hospital Problems as of 8/3/2020 Date Reviewed: 2/28/2017          Codes Class Noted - Resolved POA    Hypernatremia ICD-10-CM: E87.0  ICD-9-CM: 276.0  8/2/2020 - Present Yes        UTI (urinary tract infection) ICD-10-CM: N39.0  ICD-9-CM: 599.0  8/2/2020 - Present Yes        * (Principal) KAILEY (acute kidney injury) ([facility] [address] ICD-10-CM: N17.9  ICD-9-CM: 584.9  8/2/2020 - Present Yes        Acute metabolic encephalopathy IBO-76-LC: G93.41  ICD-9-CM: 348.31  8/2/2020 - Present Yes        Alzheimer's dementia with behavioral disturbance ([facility] [address] (Chronic) ICD-10-CM: G30.9, F02.81  ICD-9-CM: 331.0, 294.11  8/2/2020 - Present Yes        DNR (do not resuscitate) (Chronic) ICD-10-CM: U09  ICD-9-CM: V49.86  8/2/2020 - Present Yes        Diabetes mellitus, type II ([facility] [address] (Chronic) ICD-10-CM: E11.9  ICD-9-CM: 250.00  Unknown - Present Yes        Benign hypertension (Chronic) ICD-10-CM: I10  ICD-9-CM: 401.1  Unknown - Present Yes              Plan:  · Rocephin  · Follow urine cx  · d5 IVF  · Wray in place; DC soon  · Hold heparin. Use SCDs  · Hold home ARB, lasix  · ISS for DM. Start lantus 20u tonight  · SLP eval  · PT/OT    Discharge planning:  Back to SNF when improved.   Will need COVID testing most likely      Signed:  Gustavo Barrow MD

## 2020-08-03 NOTE — PROGRESS NOTES
Notice after pt received her bath, urinary pereyra drainage had an increase in red bloody drainage. Dr Elizabeth Shanks made aware, states that there may be trauma from the urinary pereyra placement and that pt is being treated for an UTI.

## 2020-08-03 NOTE — PROGRESS NOTES
Case management and  made aware that pt has a positive rapid covid swab. Case management notified the rehab facility where pt came from.

## 2020-08-03 NOTE — PROGRESS NOTES
Bedside report received from Deejay Goodson RN. No distress on 2L via NC. IVF of [email or web address] infusing. Wray draining bloody urine. On remote telemetry confirmed with Radha, monitor tech, NSR @ HR 75. SR up x 3, bed low locked. Call light within reach. Will monitor.

## 2020-08-03 NOTE — PROGRESS NOTES
Patient awake resting in bed. Respirations present. On room air. No signs of distress. Alert. Aphasic. Unable to assess orientation. S1 and S2 noted. Radial and pedal pulses palpable bilaterally. Bowel sounds active. Last BM yesterday, 8/2/2020, per flowsheets. Dual skin assessment completed with Oriana Gonzalez RN. 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 No pressure injuries noted. Bed low and locked. .  Call light within reach. Will continue to monitor. Noted BP elevated. Will recheck.

## 2020-08-03 NOTE — PROGRESS NOTES
Patient arrived to room 511. Awake resting in bed. Respirations present. On room air. No signs of distress. Bed low and locked. Call light within reach. D5 @ 100 ml/hr. Will continue to monitor.

## 2020-08-03 NOTE — PROGRESS NOTES
Patient resting in bed. Respirations present. No signs of distress. D5 @ 75 ml/hr. Bed low and locked. Call light within reach. Bedside report given to oncoming RN, Danay Ziegler.

## 2020-08-03 NOTE — PROGRESS NOTES
Spoke with daughter José Luis Pedro this am, daughter states that she is unsure of pt current medication list. Visiting hours explained to daughter, she verbalized understanding. Admission data base completed to the best of daughters ability via phone.

## 2020-08-03 NOTE — PROGRESS NOTES
Comprehensive Nutrition Assessment    Type and Reason for Visit: Initial, Positive nutrition screen(\"unsure weight loss\")    Nutrition Recommendations/Plan: If mentation remains a barrier to PO intake, and pt remains NPO, recommend placement of dobhoff tube and initiation of TF. Nutrition Assessment:  Pt admitted from long term care facility with AMS. Noted to have UTI + KAILEY upon admission. PMH notable for DM, HTN, Alzheimer's disease. Covid-19 positive. Pt is NPO per SLP evaluation this morning. Per RN, pt was unable to eat any breakfast this morning prior to NPO status d/t altered mentation/fatigue. Labs: Na 151, BUN 40, Cr 1.41, Glucose 239 mg/dl  A1C 8.6 (2017)    WT / BMI 8/2/2020 10/27/2017   WEIGHT 210 lb 211 lb   No recent weights since 2017 to assess potential weight loss. Current weight is pt stated as well. Estimated Daily Nutrient Needs:  Energy (kcal):  9030-4069 kcal/d (20-30 kcal/kg IBW/d) 56.8 kg, KAILEY, actual body weight unknown  Protein (g):  45-68 gm/d (0.8-1.2 gm/kg/d)         Current Nutrition Therapies:   DIET NUTRITIONAL SUPPLEMENTS Breakfast, Lunch, Dinner; Glucerna Shake  DIET NPO    Anthropometric Measures:  · Height:  5' 5\" (165.1 cm)  · Current Body Wt:  95.2 kg (209 lb 14.1 oz)   Body mass index is 34.95 kg/m². · Ideal Body Wt:  125 lbs:  167.9 %   · BMI Category:  Obese class 1 (BMI 30.0-34. 9)       Nutrition Diagnosis:   · Inadequate protein-energy intake related to cognitive or neurological impairment, swallowing difficulty as evidenced by (pt with AMS per RN, eating 0%, and NPO per SLP this AM.)    Nutrition Interventions:   Food and/or Nutrient Delivery: Continue NPO, Modify oral nutrition supplement   D/C glucerna shakes. Recommend initiation of EN if pt remains NPO d/t mentation. Coordination of Nutrition Care: (POC discussed with RN, Jomar Banda.)    Goals:  Meet 75% of estimated needs within 7 days via PO or EN.        Nutrition Monitoring and Evaluation: Food/Nutrient Intake Outcomes: Diet advancement/tolerance, Enteral nutrition intake/tolerance  Physical Signs/Symptoms Outcomes: Chewing or swallowing, Hemodynamic status    Discharge Planning:     Too soon to determine     Electronically signed by Zaria Garcia 87, [address] [place] [address], [address] [place] [address], [address] [place] [address]. on 8/3/2020 at 10:47 AM

## 2020-08-03 NOTE — PROGRESS NOTES
Problem: Dysphagia (Adult)  Goal: *Acute Goals and Plan of Care (Insert Text)  Outcome: Progressing Towards Goal  Note: LTG: Patient will tolerate least restrictive diet without overt signs or symptoms of airway compromise. STG: Patient will participate in po trials with ST in efforts to identify safest, least restrictive oral diet  STG: Patient will participate in modified barium swallow study as clinically indicated. SPEECH LANGUAGE PATHOLOGY: DYSPHAGIA- Initial Assessment    NAME/AGE/GENDER: Abram Burris is a 81 y.o. female  DATE: 8/3/2020  PRIMARY DIAGNOSIS: Acute metabolic encephalopathy [I91.72]  UTI (urinary tract infection) [N39.0]  KAILEY (acute kidney injury) ([facility] [address] [N17.9]  Hypernatremia [E87.0]       ICD-10: Treatment Diagnosis: R13.12 Dysphagia, Oropharyngeal Phase    RECOMMENDATIONS   DIET:   NPO    MEDICATIONS: Non-oral     ASPIRATION PRECAUTIONS  Oral care     COMPENSATORY STRATEGIES/MODIFICATIONS  None     EDUCATION:  Recommendations discussed with Hospitalist  Nursing  Patient     CONTINUATION OF SKILLED SERVICES/MEDICAL NECESSITY:  Patient is expected to demonstrate progress in  swallow strength, swallow timeliness, swallow function, and swallow safety in order to  improve swallow safety, work toward diet advancement, and decrease aspiration risk. Patient continues to require skilled intervention due to dysphagia. RECOMMENDATIONS for CONTINUED SPEECH THERAPY:   YES: Anticipate need for ongoing speech therapy during this hospitalization. ASSESSMENT   Patient presents with severe oral dysphagia and s/sx pharyngeal dysphagia with single ice chip and trace amounts of thin liquid presented by spoon. Prolonged oral holding with essentially no purposeful manipulation, throat clearing, and anterior spillage. Recommend NPO. Meds non oral. Will follow for po trials, however if persists, may want to consider palliative care consult. Discussed with MD and RN.      REHABILITATION POTENTIAL FOR STATED GOALS: Good    PLAN    FREQUENCY/DURATION: Continue to follow patient 3 times a week for duration of hospital stay to address above goals. - Recommendations for next treatment session: Next treatment will address po trials    SUBJECTIVE   No command following, no verbalizations. hx dementia. History of Present Injury/Illness: Ms. [employer]  has a past medical history of Benign hypertension, Controlled type 2 diabetes mellitus with diabetic autonomic neuropathy, with long-term current use of insulin ([address] Edema, and Hyperlipidemia. . She also  has a past surgical history that includes hx coronary artery bypass graft (2005); hx knee arthroscopy (Bilateral); hx amputation (Left); and hx partial hysterectomy. Problem List:  (Impairments causing functional limitations):  Oropharyngeal dysphagia     Previous Dysphagia: YES RN today reports holding meds due to mentation. Diet Prior to Evaluation: regular diet/thin liquids    Orientation:   No response     Pain: Pain Scale 1: FLACC  Pain Intensity 1: 0    Cognitive-Linguistic Screen: no verbal or non verbal responses. OBJECTIVE   Oral Motor:   COMMENTS: no command following    Swallow evaluation:   Patient consumed trials of ice chip x1 and trace amount of thin liquid by spoon on 2 trials. No labial receipt. Able to place spoon minimally in oral cavity. Prolonged oral holding with basically no response to bolus in oral cavity as evidenced by absent oral/labial movement. Weak throat clearing prior to swallow initiation. Presented with trace liquid on spoon, however again prolonged oral holding, no purposeful oral movement, and reduced labial seal with left sided anterior spillage. Weak throat clearing prior to swallow. No swallow initiated upon palpation with additional small amount spilling from left side. No further trials presented.      INTERDISCIPLINARY COLLABORATION: Registered Nurse and Physician  PRECAUTIONS/ALLERGIES: Patient has no known allergies. Tool Used: Dysphagia Outcome and Severity Scale (GERALD)    Score Comments   Normal Diet  [] 7 With no strategies or extra time needed   Functional Swallow  [] 6 May have mild oral or pharyngeal delay   Mild Dysphagia  [] 5 Which may require one diet consistency restricted    Mild-Moderate Dysphagia  [] 4 With 1-2 diet consistencies restricted   Moderate Dysphagia  [] 3 With 2 or more diet consistencies restricted   Moderate-Severe Dysphagia  [] 2 With partial PO strategies (trials with ST only)   Severe Dysphagia  [x] 1 With inability to tolerate any PO safely      Score:  Initial: 1 Most Recent: x (Date 08/03/20 )   Interpretation of Tool: The Dysphagia Outcome and Severity Scale (GERALD) is a simple, easy-to-use, 7-point scale developed to systematically rate the functional severity of dysphagia based on objective assessment and make recommendations for diet level, independence level, and type of nutrition. Current Medications:   No current facility-administered medications on file prior to encounter. Current Outpatient Medications on File Prior to Encounter   Medication Sig Dispense Refill    furosemide (LASIX) 40 mg tablet TAKE 1 TABLET BY MOUTH EVERY DAY 30 Tab 0    valsartan (DIOVAN) 160 mg tablet Take 1 Tab by mouth daily. 30 Tab 0    diclofenac EC (VOLTAREN) 75 mg EC tablet Take 1 Tab by mouth two (2) times a day. 30 Tab 0    rosuvastatin (CRESTOR) 20 mg tablet TAKE 1 TABLET BY MOUTH EVERY DAY 90 Tab 2    insulin detemir (LEVEMIR FLEXPEN) 100 unit/mL (3 mL) inpn 75 Units by SubCUTAneous route daily. 7 Pen 5    BD INSULIN PEN NEEDLE UF MINI 31 gauge x 3/16\" ndle USE 1 NEEDLE DAILY 30 Pen Needle 12    insulin lispro (HUMALOG) 100 unit/mL injection by SubCUTAneous route.       TRAVATAN Z 0.004 % ophthalmic solution INSTILL 1 DROP IN BOTH EYES NIGHTLY  12    TRUE METRIX AIR GLUCOSE METER monitoring kit USE AS DIRECTED 1 Kit 0    BD SINGLE USE SWABS REGULAR padm       TRUE METRIX GLUCOSE TEST STRIP strip       TRUE METRIX LEVEL 1 soln       TRUEPLUS LANCETS 28 gauge misc          After treatment position/precautions:  Upright in bed  RN notified    Total Treatment Duration:   Time In: 0137  Time Out: [address] CCC-SLP

## 2020-08-03 NOTE — PROGRESS NOTES
....TRANSFER - OUT REPORT:    Verbal report given to Elif on Maricel Rebollar  being transferred to 5th floor for a rapid positive covid test.       Report consisted of patients Situation, Background, Assessment and   Recommendations     Information from the following report was reviewed with the receiving nurse. Lines:   Peripheral IV 08/02/20 Left Antecubital (Active)   Site Assessment Clean, dry, & intact 08/03/20 0710   Phlebitis Assessment 0 08/03/20 0710   Infiltration Assessment 0 08/03/20 0710   Dressing Status Clean, dry, & intact 08/03/20 0710   Dressing Type Tape;Transparent 08/03/20 0710   Hub Color/Line Status Patent 08/03/20 0220   Alcohol Cap Used No 08/03/20 0220        Opportunity for questions and clarification was provided. Patient transported with: 6th floor staff via bed.

## 2020-08-03 NOTE — PROGRESS NOTES
SW consulted to assist with DC return to LTC at [facility]. Per liaison, Mina García, patient is accepted to return to SNF at discharge. SNF is aware of patient's COVID19 positive status. Awaiting medical readiness for DC to home.

## 2020-08-03 NOTE — PROGRESS NOTES
Problem: Self Care Deficits Care Plan (Adult)  Goal: *Acute Goals and Plan of Care (Insert Text)  Outcome: Progressing Towards Goal  Note:   1. Pt will demonstrate improved cognition to follow 50% or more basic commands with min or fewer cues  2. Pt will complete grooming and hygiene with min A  3. Pt will complete bed mobility with mod A in prep for ADLs  4. Pt will tolerate 23 minutes functional activity with min or fewer breaks to promote > endurance for ADLs    Timeframe: 7 days       OCCUPATIONAL THERAPY: Initial Assessment 8/3/2020  INPATIENT:    [place] / Plan[place] / Product Type: Medicare /      NAME/AGE/GENDER: Eleonora Vasquez is a 81 y.o. female   PRIMARY DIAGNOSIS:  Acute metabolic encephalopathy [T44.57]  UTI (urinary tract infection) [N39.0]  KAILEY (acute kidney injury) ([facility] [address] [N17.9]  Hypernatremia [E87.0] KAILEY (acute kidney injury) ([facility] [address] KAILEY (acute kidney injury) ([facility] [address]       ICD-10: Treatment Diagnosis:    · Generalized Muscle Weakness (M62.81)   Precautions/Allergies:     Patient has no known allergies. ASSESSMENT:     Ms. [employer] presents with AMS after she was found with decreased responsiveness at her SNF. Pt has a hx of dementia, unsure of PLOF d/t cognitive deficits. This session, pt presented with deficits in cognition, strength, mobility, endurance, balance, and coordination impacting ADLs. Pt opened her eyes and briefly looked at therapist, however unable to maintain visual attention. Pt mumbled in response to questions but unintelligible, unable to determine orientation. Pt did not follow commands. Pt required max A for simple grooming and hygiene. Pt unable to follow commands for formal UE assessment, however appears to have decreased strength, ROM, and coordination through functional assessment. Will follow pt on a trial basis to determine whether pt is able to participate in and demonstrate carryover to benefit from OT services.  Recommend d/c back to SNF when medically stable. This section established at most recent assessment   PROBLEM LIST (Impairments causing functional limitations):  1. Decreased Strength  2. Decreased ADL/Functional Activities  3. Decreased Transfer Abilities  4. Decreased Balance  5. Decreased Cognition   INTERVENTIONS PLANNED: (Benefits and precautions of occupational therapy have been discussed with the patient.)  1. Activities of daily living training  2. Adaptive equipment training  3. Balance training  4. Cognitive training  5. Therapeutic activity  6. Therapeutic exercise     TREATMENT PLAN: Frequency/Duration: Follow patient 2 times/ week trial to address above goals. Rehabilitation Potential For Stated Goals: guarded     REHAB RECOMMENDATIONS (at time of discharge pending progress):    Placement: It is my opinion, based on this patient's performance to date, that Ms. Iraj Rawls may benefit from d/c back to SNF  Equipment:    None at this time              OCCUPATIONAL PROFILE AND HISTORY:   History of Present Injury/Illness (Reason for Referral):  See H&P  Past Medical History/Comorbidities:   Ms. Iraj Rawls  has a past medical history of Benign hypertension, Controlled type 2 diabetes mellitus with diabetic autonomic neuropathy, with long-term current use of insulin ([facility] [address] Edema, and Hyperlipidemia. Ms. Iraj Rawls  has a past surgical history that includes hx coronary artery bypass graft (2005); hx knee arthroscopy (Bilateral); hx amputation (Left); and hx partial hysterectomy.   Social History/Living Environment:   Home Environment: Skilled nursing facility  Prior Level of Function/Work/Activity:  See assessment      Number of Personal Factors/Comorbidities that affect the Plan of Care: Expanded review of therapy/medical records (1-2):  MODERATE COMPLEXITY   ASSESSMENT OF OCCUPATIONAL PERFORMANCE[de-identified]   Activities of Daily Living:   Basic ADLs (From Assessment) Complex ADLs (From Assessment)   Feeding: Maximum assistance  Oral Facial Hygiene/Grooming: Maximum assistance  Bathing: Maximum assistance, Total assistance  Upper Body Dressing: Maximum assistance, Total assistance  Lower Body Dressing: Total assistance  Toileting: Total assistance Instrumental ADL  Meal Preparation: Total assistance  Homemaking: Total assistance  Medication Management: Total assistance  Financial Management: Total assistance   Grooming/Bathing/Dressing Activities of Daily Living     Cognitive Retraining  Safety/Judgement: Fall prevention;Decreased awareness of need for safety                       Bed/Mat Mobility  Rolling: Maximum assistance  Supine to Sit: Maximum assistance  Sit to Supine: Maximum assistance  Scooting: Total assistance     Most Recent Physical Functioning:   Gross Assessment:  AROM: Generally decreased, functional  Strength: Generally decreased, functional  Coordination: Generally decreased, functional               Posture:     Balance:  Sitting: Impaired  Sitting - Static: Poor (constant support)  Sitting - Dynamic: Poor (constant support) Bed Mobility:  Rolling: Maximum assistance  Supine to Sit: Maximum assistance  Sit to Supine: Maximum assistance  Scooting: Total assistance  Wheelchair Mobility:     Transfers:               Patient Vitals for the past 6 hrs:   BP BP Patient Position SpO2 O2 Flow Rate (L/min) Pulse   08/03/20 1144 184/62 At rest 93 %  86   08/03/20 1200     85   08/03/20 1252 (!) 156/104       08/03/20 1254     85   08/03/20 1440 120/84 At rest (!) 84 %  (!) 104   08/03/20 1447   95 % 2 l/min (!) 103       Mental Status  Neurologic State: Alert, Confused  Orientation Level: Unable to verbalize  Cognition: No command following  Perception: Cues to maintain midline in sitting  Perseveration: No perseveration noted  Safety/Judgement: Fall prevention, Decreased awareness of need for safety                          Physical Skills Involved:  1. Range of Motion  2. Balance  3. Strength  4.  Activity Tolerance Cognitive Skills Affected (resulting in the inability to perform in a timely and safe manner):  1. Executive Function  2. Immediate Memory  3. Short Term Recall  4. Long Term Memory  5. Sustained Attention  6. Divided Attention  7. Comprehension  8. Expression Psychosocial Skills Affected:  1. Self-Awareness   Number of elements that affect the Plan of Care: 5+:  HIGH COMPLEXITY   CLINICAL DECISION MAKIN[address] [place] [address] AM-PAC 6 Clicks   Daily Activity Inpatient Short Form  How much help from another person does the patient currently need. .. Total A Lot A Little None   1. Putting on and taking off regular lower body clothing? [x] 1   [] 2   [] 3   [] 4   2. Bathing (including washing, rinsing, drying)? [] 1   [x] 2   [] 3   [] 4   3. Toileting, which includes using toilet, bedpan or urinal?   [x] 1   [] 2   [] 3   [] 4   4. Putting on and taking off regular upper body clothing? [] 1   [x] 2   [] 3   [] 4   5. Taking care of personal grooming such as brushing teeth? [] 1   [x] 2   [] 3   [] 4   6. Eating meals? [] 1   [x] 2   [] 3   [] 4   © , Trustees of [address] [place] [address], under license to [employer]. All rights reserved      Score:  Initial: 10 Most Recent: X (Date: -- )    Interpretation of Tool:  Represents activities that are increasingly more difficult (i.e. Bed mobility, Transfers, Gait). Medical Necessity:     · Patient is expected to demonstrate progress in strength, balance and functional technique to decrease assistance required with ADLs. Reason for Services/Other Comments:  · Patient continues to require present interventions due to patient's inability to complete ADLs.    Use of outcome tool(s) and clinical judgement create a POC that gives a: MODERATE COMPLEXITY         TREATMENT:   (In addition to Assessment/Re-Assessment sessions the following treatments were rendered)     Pre-treatment Symptoms/Complaints:    Pain: Initial:   Pain Intensity 1: 0  Post Session:  0 Assessment/Reassessment only, no treatment provided today    Braces/Orthotics/Lines/Etc:   · O2 Device: Nasal cannula  Treatment/Session Assessment:    · Response to Treatment:  No adverse reaction   · Interdisciplinary Collaboration:   o Occupational Therapist  o Registered Nurse  · After treatment position/precautions:   o Supine in bed  o Bed/Chair-wheels locked  o Bed in low position  o Call light within reach  o RN notified   · Compliance with Program/Exercises: Will assess as treatment progresses. · Recommendations/Intent for next treatment session: \"Next visit will focus on advancements to more challenging activities and reduction in assistance provided\".   Total Treatment Duration:  OT Patient Time In/Time Out  Time In: 0959  Time Out: [facility]

## 2020-08-03 NOTE — PROGRESS NOTES
TRANSFER - IN REPORT:    Verbal report received from Stephanie(name) on Eleonora Mar  being received from 602(unit) for change in patient condition(coid + results)      Report consisted of patients Situation, Background, Assessment and   Recommendations(SBAR). Information from the following report(s) SBAR was reviewed with the receiving nurse. Opportunity for questions and clarification was provided. Will assess patient when arrives to 526.

## 2020-08-04 ENCOUNTER — APPOINTMENT (OUTPATIENT)
Dept: GENERAL RADIOLOGY | Age: 85
DRG: 871 | End: 2020-08-04
Attending: INTERNAL MEDICINE
Payer: MEDICARE

## 2020-08-04 PROBLEM — A41.9 SEPSIS (HCC): Status: ACTIVE | Noted: 2020-08-04

## 2020-08-04 LAB
ANION GAP SERPL CALC-SCNC: 7 MMOL/L (ref 7–16)
BUN SERPL-MCNC: 23 MG/DL (ref 8–23)
CALCIUM SERPL-MCNC: 9 MG/DL (ref 8.3–10.4)
CHLORIDE SERPL-SCNC: 117 MMOL/L (ref 98–107)
CO2 SERPL-SCNC: 22 MMOL/L (ref 21–32)
CREAT SERPL-MCNC: 1.16 MG/DL (ref 0.6–1)
CRP SERPL-MCNC: 4.9 MG/DL (ref 0–0.9)
GLUCOSE BLD STRIP.AUTO-MCNC: 185 MG/DL (ref 65–100)
GLUCOSE BLD STRIP.AUTO-MCNC: 218 MG/DL (ref 65–100)
GLUCOSE BLD STRIP.AUTO-MCNC: 224 MG/DL (ref 65–100)
GLUCOSE BLD STRIP.AUTO-MCNC: 225 MG/DL (ref 65–100)
GLUCOSE BLD STRIP.AUTO-MCNC: 264 MG/DL (ref 65–100)
GLUCOSE SERPL-MCNC: 201 MG/DL (ref 65–100)
MAGNESIUM SERPL-MCNC: 1.9 MG/DL (ref 1.8–2.4)
PHOSPHATE SERPL-MCNC: 1.8 MG/DL (ref 2.3–3.7)
POTASSIUM SERPL-SCNC: 4 MMOL/L (ref 3.5–5.1)
SODIUM SERPL-SCNC: 146 MMOL/L (ref 136–145)

## 2020-08-04 PROCEDURE — 65660000000 HC RM CCU STEPDOWN

## 2020-08-04 PROCEDURE — 86140 C-REACTIVE PROTEIN: CPT

## 2020-08-04 PROCEDURE — 77010033678 HC OXYGEN DAILY

## 2020-08-04 PROCEDURE — 82962 GLUCOSE BLOOD TEST: CPT

## 2020-08-04 PROCEDURE — 74011250637 HC RX REV CODE- 250/637: Performed by: INTERNAL MEDICINE

## 2020-08-04 PROCEDURE — 94760 N-INVAS EAR/PLS OXIMETRY 1: CPT

## 2020-08-04 PROCEDURE — 74011636637 HC RX REV CODE- 636/637: Performed by: INTERNAL MEDICINE

## 2020-08-04 PROCEDURE — 71045 X-RAY EXAM CHEST 1 VIEW: CPT

## 2020-08-04 PROCEDURE — 74011250636 HC RX REV CODE- 250/636: Performed by: INTERNAL MEDICINE

## 2020-08-04 PROCEDURE — 84100 ASSAY OF PHOSPHORUS: CPT

## 2020-08-04 PROCEDURE — 92526 ORAL FUNCTION THERAPY: CPT

## 2020-08-04 PROCEDURE — 80048 BASIC METABOLIC PNL TOTAL CA: CPT

## 2020-08-04 PROCEDURE — 74011000258 HC RX REV CODE- 258: Performed by: INTERNAL MEDICINE

## 2020-08-04 PROCEDURE — 83735 ASSAY OF MAGNESIUM: CPT

## 2020-08-04 RX ORDER — ASCORBIC ACID 500 MG
500 TABLET ORAL 3 TIMES DAILY
Status: DISCONTINUED | OUTPATIENT
Start: 2020-08-04 | End: 2020-08-07 | Stop reason: HOSPADM

## 2020-08-04 RX ORDER — UREA 10 %
220 LOTION (ML) TOPICAL DAILY
Status: DISCONTINUED | OUTPATIENT
Start: 2020-08-05 | End: 2020-08-07 | Stop reason: HOSPADM

## 2020-08-04 RX ORDER — DEXAMETHASONE SODIUM PHOSPHATE 100 MG/10ML
6 INJECTION INTRAMUSCULAR; INTRAVENOUS DAILY
Status: DISCONTINUED | OUTPATIENT
Start: 2020-08-04 | End: 2020-08-07 | Stop reason: HOSPADM

## 2020-08-04 RX ORDER — FAMOTIDINE 20 MG/1
20 TABLET, FILM COATED ORAL DAILY PRN
Status: DISCONTINUED | OUTPATIENT
Start: 2020-08-04 | End: 2020-08-07 | Stop reason: HOSPADM

## 2020-08-04 RX ADMIN — HEPARIN SODIUM 5000 UNITS: 5000 INJECTION INTRAVENOUS; SUBCUTANEOUS at 05:51

## 2020-08-04 RX ADMIN — CEFTRIAXONE SODIUM 1 G: 1 INJECTION, POWDER, FOR SOLUTION INTRAMUSCULAR; INTRAVENOUS at 19:35

## 2020-08-04 RX ADMIN — HEPARIN SODIUM 5000 UNITS: 5000 INJECTION INTRAVENOUS; SUBCUTANEOUS at 21:36

## 2020-08-04 RX ADMIN — INSULIN LISPRO 4 UNITS: 100 INJECTION, SOLUTION INTRAVENOUS; SUBCUTANEOUS at 21:35

## 2020-08-04 RX ADMIN — HEPARIN SODIUM 5000 UNITS: 5000 INJECTION INTRAVENOUS; SUBCUTANEOUS at 13:20

## 2020-08-04 RX ADMIN — DEXAMETHASONE SODIUM PHOSPHATE 6 MG: 10 INJECTION INTRAMUSCULAR; INTRAVENOUS at 16:54

## 2020-08-04 RX ADMIN — INSULIN LISPRO 4 UNITS: 100 INJECTION, SOLUTION INTRAVENOUS; SUBCUTANEOUS at 16:54

## 2020-08-04 RX ADMIN — ACETAMINOPHEN 650 MG: 650 SUPPOSITORY RECTAL at 09:15

## 2020-08-04 RX ADMIN — Medication 10 ML: at 21:37

## 2020-08-04 RX ADMIN — Medication 1 EACH: at 16:55

## 2020-08-04 RX ADMIN — DEXTROSE MONOHYDRATE 75 ML/HR: 5 INJECTION, SOLUTION INTRAVENOUS at 14:02

## 2020-08-04 RX ADMIN — INSULIN LISPRO 2 UNITS: 100 INJECTION, SOLUTION INTRAVENOUS; SUBCUTANEOUS at 05:48

## 2020-08-04 RX ADMIN — INSULIN LISPRO 6 UNITS: 100 INJECTION, SOLUTION INTRAVENOUS; SUBCUTANEOUS at 12:35

## 2020-08-04 RX ADMIN — Medication 10 ML: at 13:21

## 2020-08-04 NOTE — CDMP QUERY
Pt admitted with Cystitis and COVID +, Pt noted to have AMS, fever, tachycardia, tachypnea with hypoxia. . If possible, please document in the progress notes and d/c summary if you are evaluating and / or treating any of the following: 
 
? Sepsis, present on admission, suspected or probable causative organism (please specify) ? Sepsis, now resolved, suspected or probable causative organism (please specify) ? Sepsis, not present on admission, suspected or probable causative organism (please specify) ? No Sepsis, suspected or probable localized infection (please specify) ? Sepsis was ruled out (include corresponding diagnosis for patients clinical picture and treatment) ? SIRS 
? Other, please specify ? Clinically unable to determine The medical record reflects the following: 
   Risk Factors: Cystitis and COVID 19 + Clinical Indicators: Tmax 101.1, , RR 26, O2 sat dropped to 84%.  Lactate 1.0,   
   Treatment: Monitor, O2 2LNC, NS 1000cc NS bolus, NS @ 100-150cc/hr, D51/2  cc/hr, Rocephin, Maxipime, Zinc

## 2020-08-04 NOTE — PROGRESS NOTES
Pt NPO with encephalopathy. Messaged Dr. Mihai Rincon. New order to hold Lantus tonight and cover with just sliding scale. SQBS 212.

## 2020-08-04 NOTE — PROGRESS NOTES
Pt is resting in bed. Respirations present on 1L NC. No signs of distress. No needs expressed. Bed low and locked. Call light within reach.  Report received from Mary Carmen, [facility]

## 2020-08-04 NOTE — PROGRESS NOTES
Name: Jeana Lemus MRN: [id number]  : 1935  Age:85 y.o.  female  Admit Date:  2020 LOS: 2      Hospitalist Progress Note    Jeana Lemus is a 81 y.o. female with medical history significant for diabetes, hypertension who was admitted to ED on 2020 due to altered mental status. Was found to have UTI, acute kidney injury, hyponatremia on admission. Patient tested positive for COVID-19 based on rapid test on 8/3. Patient was found to be saturating around 88% on room air and now requiring 2L O2 NC. Subjective (20) :  Patient is seen and examined at bedside. No acute events reported overnight by nursing staff. Patient appears confused and non-verbal. Not answering any questions. Had temp spike of 101.1F this AM.     As per patient's family, she has dementia as baseline but is able to carry conersations prior to coming to the ED. ROS:  Unable to assess due to clinical condition    Objective:    Patient Vitals for the past 24 hrs:   Temp Pulse Resp BP SpO2   20 99.6 °F (37.6 °C) 68 18 (!) 132/99 95 %   20  64      20 1501 98.2 °F (36.8 °C) 73 18 139/60 95 %   20 1123 99 °F (37.2 °C) 87 18 147/70 95 %   20 1009     96 %   20 0729 (!) 101.1 °F (38.4 °C) 82 18 153/69 97 %   20 0335 97.6 °F (36.4 °C) 72 18 137/59 91 %   20 2252 98.9 °F (37.2 °C) 76 16 104/78 98 %     Oxygen Therapy  O2 Sat (%): 95 % (20)  Pulse via Oximetry: 68 beats per minute (20 1601)  O2 Device: Nasal cannula (20)  O2 Flow Rate (L/min): 1 l/min (20)    Estimated body mass index is 34.95 kg/m² as calculated from the following:    Height as of this encounter: 5' 5\" (1.651 m). Weight as of this encounter: 95.3 kg (210 lb).       Intake/Output Summary (Last 24 hours) at 2020 2220  Last data filed at 2020 1810  Gross per 24 hour   Intake 553 ml   Output 950 ml   Net -397 ml       *Note that automatically entered I/Os may not be accurate; dependent on patient compliance with collection and accurate  by techs. Physical Exam:   General:     awake, no acute distress. Not answering questions. Well nourished. Obese. Head:   normocephalic, atraumatic  Eyes, Ears, nose: PERRL. Normal conjunctiva  Neck:    supple, non-tender. Trachea midline. Lungs:   CTAB, no wheezing, rhonchi, rales  Cardiac:   RRR, Normal S1 and S2. Abdomen:   Soft, non distended, nontender, +BS  Extremities:   Warm, dry. No edema   Skin:   No rashes, no jaundice  Neuro:  Awake, opens eyes and tract.      Data Review:  I have reviewed all labs, meds, and studies from the last 24 hours:    Recent Results (from the past 24 hour(s))   GLUCOSE, POC    Collection Time: 08/04/20  5:24 AM   Result Value Ref Range    Glucose (POC) 185 (H) 65 - 357 mg/dL   METABOLIC PANEL, BASIC    Collection Time: 08/04/20  6:24 AM   Result Value Ref Range    Sodium 146 (H) 136 - 145 mmol/L    Potassium 4.0 3.5 - 5.1 mmol/L    Chloride 117 (H) 98 - 107 mmol/L    CO2 22 21 - 32 mmol/L    Anion gap 7 7 - 16 mmol/L    Glucose 201 (H) 65 - 100 mg/dL    BUN 23 8 - 23 MG/DL    Creatinine 1.16 (H) 0.6 - 1.0 MG/DL    GFR est AA 57 (L) >60 ml/min/1.73m2    GFR est non-AA 47 (L) >60 ml/min/1.73m2    Calcium 9.0 8.3 - 10.4 MG/DL   C REACTIVE PROTEIN, QT    Collection Time: 08/04/20  6:24 AM   Result Value Ref Range    C-Reactive protein 4.9 (H) 0.0 - 0.9 mg/dL   MAGNESIUM    Collection Time: 08/04/20  6:24 AM   Result Value Ref Range    Magnesium 1.9 1.8 - 2.4 mg/dL   PHOSPHORUS    Collection Time: 08/04/20  6:24 AM   Result Value Ref Range    Phosphorus 1.8 (L) 2.3 - 3.7 MG/DL   GLUCOSE, POC    Collection Time: 08/04/20 11:44 AM   Result Value Ref Range    Glucose (POC) 264 (H) 65 - 100 mg/dL   GLUCOSE, POC    Collection Time: 08/04/20  4:25 PM   Result Value Ref Range    Glucose (POC) 218 (H) 65 - 100 mg/dL   GLUCOSE, POC    Collection Time: 08/04/20  4:54 PM   Result Value Ref Range    Glucose (POC) 224 (H) 65 - 100 mg/dL   GLUCOSE, POC    Collection Time: 08/04/20  9:05 PM   Result Value Ref Range    Glucose (POC) 225 (H) 65 - 100 mg/dL        All Micro Results     Procedure Component Value Units Date/Time    CULTURE, URINE [[id number]] Collected:  08/02/20 1427    Order Status:  Completed Specimen:  Cath Urine Updated:  08/04/20 0928     Special Requests: NO SPECIAL REQUESTS        Culture result:       >100,000 COLONIES/mL MIXED SKIN ABDIRASHID ISOLATED          CULTURE, BLOOD [[id number]] Collected:  08/02/20 1430    Order Status:  Completed Specimen:  Blood Updated:  08/04/20 0750     Special Requests: --        RIGHT  FOREARM       Culture result: NO GROWTH 2 DAYS       CULTURE, BLOOD [[id number]] Collected:  08/02/20 1427    Order Status:  Completed Specimen:  Blood Updated:  08/04/20 0750     Special Requests: --        LEFT  Antecubital       Culture result: NO GROWTH 2 DAYS       CULTURE, URINE [[id number]]     Order Status:  Canceled Specimen:  Cath Urine           Current Meds:  Current Facility-Administered Medications   Medication Dose Route Frequency    lip protectant (BLISTEX) ointment 1 Each  1 Each Topical PRN    famotidine (PEPCID) tablet 20 mg  20 mg Oral DAILY PRN    [Held by provider] ascorbic acid (vitamin C) (VITAMIN C) tablet 500 mg  500 mg Oral TID    [Held by provider] zinc sulfate tablet 220 mg  220 mg Oral DAILY    dexamethasone (DECADRON) 10 mg/mL injection 6 mg  6 mg IntraVENous DAILY    dextrose 5% infusion  75 mL/hr IntraVENous CONTINUOUS    [Held by provider] insulin glargine (LANTUS) injection 20 Units  20 Units SubCUTAneous QHS    [Held by provider] rosuvastatin (CRESTOR) tablet 20 mg  20 mg Oral QHS    insulin lispro (HUMALOG) injection   SubCUTAneous AC&HS    dextrose 40% (GLUTOSE) oral gel 1 Tube  15 g Oral PRN    glucagon (GLUCAGEN) injection 1 mg  1 mg IntraMUSCular PRN    dextrose (D50W) injection syrg 12.5-25 g  25-50 mL IntraVENous PRN    sodium chloride (NS) flush 5-40 mL  5-40 mL IntraVENous Q8H    sodium chloride (NS) flush 5-40 mL  5-40 mL IntraVENous PRN    acetaminophen (TYLENOL) tablet 650 mg  650 mg Oral Q6H PRN    Or    acetaminophen (TYLENOL) suppository 650 mg  650 mg Rectal Q6H PRN    polyethylene glycol (MIRALAX) packet 17 g  17 g Oral DAILY PRN    bisacodyL (DULCOLAX) suppository 10 mg  10 mg Rectal DAILY PRN    ondansetron (ZOFRAN ODT) tablet 4 mg  4 mg Oral Q8H PRN    Or    ondansetron (ZOFRAN) injection 4 mg  4 mg IntraVENous Q6H PRN    potassium chloride (KLOR-CON) tablet 40 mEq  40 mEq Oral PRN    Or    potassium bicarb-citric acid (EFFER-K) tablet 40 mEq  40 mEq Oral PRN    Or    potassium chloride 10 mEq in 100 ml IVPB  10 mEq IntraVENous PRN    potassium chloride 10 mEq in 100 ml IVPB  10 mEq IntraVENous PRN    magnesium sulfate 2 g/50 ml IVPB (premix or compounded)  2 g IntraVENous PRN    heparin (porcine) injection 5,000 Units  5,000 Units SubCUTAneous Q8H    guaiFENesin ER (MUCINEX) tablet 1,200 mg  1,200 mg Oral BID PRN    hydrALAZINE (APRESOLINE) 20 mg/mL injection 20 mg  20 mg IntraVENous Q4H PRN    alum-mag hydroxide-simeth (MYLANTA) oral suspension 30 mL  30 mL Oral Q4H PRN    oxyCODONE IR (ROXICODONE) tablet 5 mg  5 mg Oral Q4H PRN    oxyCODONE IR (ROXICODONE) tablet 10 mg  10 mg Oral Q4H PRN    guaiFENesin-dextromethorphan (ROBITUSSIN DM) 100-10 mg/5 mL syrup 10 mL  10 mL Oral Q4H PRN    senna-docusate (PERICOLACE) 8.6-50 mg per tablet 2 Tab  2 Tab Oral DAILY PRN    hydrOXYzine pamoate (VISTARIL) capsule 50 mg  50 mg Oral QID PRN    LORazepam (ATIVAN) tablet 0.5 mg  0.5 mg Oral Q4H PRN    mirtazapine (REMERON) tablet 15 mg  15 mg Oral QHS PRN    cefTRIAXone (ROCEPHIN) 1 g in 0.9% sodium chloride (MBP/ADV) 50 mL  1 g IntraVENous Q24H         Other Studies:  Ct Head Wo Cont    Result Date: 8/2/2020  Exam: CT head without contrast. Indication: Altered mental status. Comparison: None. Multiple axial images obtained through the brain without intravenous contrast. Radiation dose reduction techniques were used for this study: All CT scans performed at this facility use one or all of the following: Automated exposure control, adjustment of the mA and/or kVp according to patient's size, iterative reconstruction. FINDINGS: Prominent chronic mineralization of the dentate nuclei of the cerebellum and of the basal ganglia. Diffuse cerebral atrophy with commensurate ex vacuo dilatation of the ventricles. Scattered periventricular and centrum semiovale white matter hypointensities most indicative of chronic ischemic white matter change. No evidence of intracranial mass, hemorrhage, or large territorial infarct. The basal cisterns are patent. No extra-axial fluid collection or mass effect. Left lens replacement. The paranasal sinuses are clear. The mastoid air cells and middle ears are clear. No significant osseous or extracranial soft tissue lesions. IMPRESSION: 1. No evidence of acute intracranial abnormality. 2. Prominent chronic mineralization of the basal ganglia and dentate nuclei likely senescent change however recommend correlation for possible hyperparathyroidism or pseudohyperparathyroidism. Xr Chest Port    Result Date: 8/2/2020  EXAM: Single view chest radiograph. INDICATION: Altered mental status. COMPARISON: None. FINDINGS: Hypoventilatory exam with bronchovascular crowding and bibasilar atelectasis. Cardiomegaly evident. Sternal wires appear intact. Mediastinal surgical clips evident. Atherosclerotic aortic arch. Advanced right shoulder joint degenerative changes. IMPRESSION: 1. Hypoventilatory exam with bronchovascular crowding and bibasilar atelectasis. 2. Cardiomegaly status post median sternotomy.           Assessment:    Active Hospital Problems    Diagnosis Date Noted    Sepsis ([address] 08/04/2020    Hypernatremia 08/02/2020    UTI (urinary tract infection) 08/02/2020    KAILEY (acute kidney injury) ([facility] [address] 08/02/2020    Acute metabolic encephalopathy 46/91/8748    Alzheimer's dementia with behavioral disturbance ([facility] 08/02/2020    DNR (do not resuscitate) 08/02/2020    Diabetes mellitus, type II ([facility]     Benign hypertension        Plan:    Acute metabolic encephalopathy likely multifactorial (UTI, Hypernatremia, hypoxia)  - treat underlying condition as below  - NPO for now  - SLP following    Acute respiratory failure with Hypoxia (from UTI and COVID 19 infection, ?aspiration)  Sepsis(present on admission) due to UTI +/- COVID-19 Infection  COVID-19 Infection  UTI  Meets criteria with tachycardia and hypoxia on 8/3 (88% on room air with HR > 100)  Rapid COVID-19 test is positive on 8/3. Requiring 1-2L O2. No leukocytosis but   - dexamethasone 6mg daily  - zinc and vitamin C supplment  - O2 supplement as needed and wean as tolerated  - continue with rocephin for now  - follow-up UCx and BCx  - CXR for ? aspiration    Hypernatremia: continue with D5   Hypertension: hold home medications for now      KAILEY (improving)  - continue with D5W    T2DM  - hold diabetic medications for now given NPO    Alzheimer's dementia with behavioral disturbance    Spoke with Letty (Daughter) and updated. Diet:  DIET NPO  DVT PPx: heparin sq  Code: DNR  Dispo: SNF  Estimated Discharge: TBD based on clinical course    Labs/Imaging Reviewed. Patient is high risk due to current condition and comorbid conditions as well as requiring frequent monitoring. Plan discussed with staff, patient/family and are in agreement. Time Spent: Greater than 45 minutes was spent in reviewing charts, physical exam, discussion with patient, and answered any questions.     Signed By: Danna Jean MD     August 4, 2020

## 2020-08-04 NOTE — PROGRESS NOTES
Quiet shift. No distress on 2L via NC. IVF infusing at present. Pt responds to stimuli and voice. Will update oncoming nurse.

## 2020-08-04 NOTE — PROGRESS NOTES
Problem: Dysphagia (Adult)  Goal: *Acute Goals and Plan of Care (Insert Text)  Outcome: Progressing Towards Goal  Note: LTG: Patient will tolerate least restrictive diet without overt signs or symptoms of airway compromise. STG: Patient will participate in po trials with ST in efforts to identify safest, least restrictive oral diet  STG: Patient will participate in modified barium swallow study as clinically indicated. SPEECH LANGUAGE PATHOLOGY: DYSPHAGIA- Daily Note  1  NAME/AGE/GENDER: Jeana Lemus is a 81 y.o. female  DATE: 8/4/2020  PRIMARY DIAGNOSIS: Acute metabolic encephalopathy [I90.76]  UTI (urinary tract infection) [N39.0]  KAILEY (acute kidney injury) ([facility] [address] [N17.9]  Hypernatremia [E87.0]      ICD-10: Treatment Diagnosis: R13.12 Dysphagia, Oropharyngeal Phase    RECOMMENDATIONS   DIET:    NPO    MEDICATIONS: Non-oral     ASPIRATION PRECAUTIONS  · oral care     COMPENSATORY STRATEGIES/MODIFICATIONS  · None     EDUCATION:  · Recommendations discussed with Nursing  · Patient     CONTINUATION OF SKILLED SERVICES/MEDICAL NECESSITY:   Patient is expected to demonstrate progress in  swallow strength, swallow timeliness, swallow function and swallow safety in order to  improve swallow safety, work toward diet advancement and decrease aspiration risk.  Patient continues to require skilled intervention due to dysphagia. RECOMMENDATIONS for CONTINUED SPEECH THERAPY:   YES: Anticipate need for ongoing speech therapy during this hospitalization. ASSESSMENT   Ongoing severe oral dysphagia with slight improvement compared to initial evaluation 8/3. Dysphagia complicated by mentation at this time. Impaired labial receipt/acceptance, impaired oral containment with anterior loss of thin liquids by tsp, and prolonged oral holding with no purposeful manipulation in response to presentations. With significant cues and encouragement, inconsistently minimal, slowed oral movement and delayed swallow.  No overt s/sx airway compromise, but limited trials presented due to severity of oral phase and high risk of aspiration. Not safe for any po intake at this time. Recommend NPO. meds non oral. Will follow for ongoing po trials in efforts to identify safest least restrictive diet. COMPLIANCE WITH PROGRAM/EXERCISES: Will assess as treatment progresses  REHABILITATION POTENTIAL FOR STATED GOALS: Good    PLAN    FREQUENCY/DURATION: Continue to follow patient 3 times a week for duration of hospital stay to address above goals. - Recommendations for next treatment session: Next treatment will address po trials    SUBJECTIVE   Awake. Poor command following. Occasional vocalizations but low volume and difficult to understand. RN present. Reportedly patient on regular diet/thin liquids at baseline and able to communicate without significant difficulty. Problem List:  (Impairments causing functional limitations):  1. Oropharyngeal dysphagia    Orientation:   Did not respond     Pain: Pain Scale 1: FLACC  Pain Intensity 1: 0    OBJECTIVE   Patient seen for po trials. Presented with ice chips, thin liquids via tsp and cup, and 1/2tsp puree. No purposeful oral movement in response to trials independently. With max cues, patient slowly and intermittently moved ice chip around oral cavity with severely prolonged oral phase and eventual swallow palpated. Anterior loss of thin liquids by tsp on 2 occasions as patient demonstrated absent labial seal maintaining slight open mouth posture. Presented small amount by cup edge and patient did not attempt to seal lips around cup despite cues. Prolonged oral holding with eventual spontaneous swallow upon palpation. Presented 1/2 tsp puree by spoon, however no oral manipulation and severe oral holding.  SLP attempted to remove small amount of puree from oral cavity and Provided additional ice chip to aid in clearance, however again max cues and encouragement needed for any purposeful manipulation. No additional trials presented. INTERDISCIPLINARY COLLABORATION: Registered Nurse  PRECAUTIONS/ALLERGIES: Patient has no known allergies. Tool Used: Dysphagia Outcome and Severity Scale (GERALD)    Score Comments   Normal Diet  [] 7 With no strategies or extra time needed   Functional Swallow  [] 6 May have mild oral or pharyngeal delay   Mild Dysphagia  [] 5 Which may require one diet consistency restricted    Mild-Moderate Dysphagia  [] 4 With 1-2 diet consistencies restricted   Moderate Dysphagia  [] 3 With 2 or more diet consistencies restricted   Moderate-Severe Dysphagia  [] 2 With partial PO strategies (trials with ST only)   Severe Dysphagia  [] 1 With inability to tolerate any PO safely      Score:  Initial: 1 Most Recent: 2 (Date 08/04/20 )   Interpretation of Tool: The Dysphagia Outcome and Severity Scale (GERALD) is a simple, easy-to-use, 7-point scale developed to systematically rate the functional severity of dysphagia based on objective assessment and make recommendations for diet level, independence level, and type of nutrition.      After treatment position/precautions:  · Upright in bed  · RN notified  · Call light within reach    Total Treatment Duration:   Time In: 4558  Time Out: [address], [address] [id number] [facility]

## 2020-08-05 LAB
ANION GAP SERPL CALC-SCNC: 7 MMOL/L (ref 7–16)
BACTERIA SPEC CULT: NORMAL
BASOPHILS # BLD: 0 K/UL (ref 0–0.2)
BASOPHILS NFR BLD: 0 % (ref 0–2)
BUN SERPL-MCNC: 18 MG/DL (ref 8–23)
CALCIUM SERPL-MCNC: 9.1 MG/DL (ref 8.3–10.4)
CHLORIDE SERPL-SCNC: 109 MMOL/L (ref 98–107)
CO2 SERPL-SCNC: 24 MMOL/L (ref 21–32)
CREAT SERPL-MCNC: 1.12 MG/DL (ref 0.6–1)
CRP SERPL-MCNC: 5.3 MG/DL (ref 0–0.9)
DIFFERENTIAL METHOD BLD: ABNORMAL
EOSINOPHIL # BLD: 0 K/UL (ref 0–0.8)
EOSINOPHIL NFR BLD: 0 % (ref 0.5–7.8)
ERYTHROCYTE [DISTWIDTH] IN BLOOD BY AUTOMATED COUNT: 12.9 % (ref 11.9–14.6)
GLUCOSE BLD STRIP.AUTO-MCNC: 268 MG/DL (ref 65–100)
GLUCOSE BLD STRIP.AUTO-MCNC: 287 MG/DL (ref 65–100)
GLUCOSE BLD STRIP.AUTO-MCNC: 335 MG/DL (ref 65–100)
GLUCOSE SERPL-MCNC: 310 MG/DL (ref 65–100)
HCT VFR BLD AUTO: 35.4 % (ref 35.8–46.3)
HGB BLD-MCNC: 11.7 G/DL (ref 11.7–15.4)
IMM GRANULOCYTES # BLD AUTO: 0.1 K/UL (ref 0–0.5)
IMM GRANULOCYTES NFR BLD AUTO: 1 % (ref 0–5)
LYMPHOCYTES # BLD: 1.1 K/UL (ref 0.5–4.6)
LYMPHOCYTES NFR BLD: 20 % (ref 13–44)
MCH RBC QN AUTO: 28.5 PG (ref 26.1–32.9)
MCHC RBC AUTO-ENTMCNC: 33.1 G/DL (ref 31.4–35)
MCV RBC AUTO: 86.1 FL (ref 79.6–97.8)
MONOCYTES # BLD: 0.2 K/UL (ref 0.1–1.3)
MONOCYTES NFR BLD: 3 % (ref 4–12)
NEUTS SEG # BLD: 4.2 K/UL (ref 1.7–8.2)
NEUTS SEG NFR BLD: 76 % (ref 43–78)
NRBC # BLD: 0 K/UL (ref 0–0.2)
PLATELET # BLD AUTO: 174 K/UL (ref 150–450)
PLATELET COMMENTS,PCOM: ADEQUATE
PMV BLD AUTO: 12.8 FL (ref 9.4–12.3)
POTASSIUM SERPL-SCNC: 3.8 MMOL/L (ref 3.5–5.1)
RBC # BLD AUTO: 4.11 M/UL (ref 4.05–5.2)
RBC MORPH BLD: ABNORMAL
SERVICE CMNT-IMP: NORMAL
SODIUM SERPL-SCNC: 140 MMOL/L (ref 136–145)
WBC # BLD AUTO: 5.6 K/UL (ref 4.3–11.1)
WBC MORPH BLD: ABNORMAL

## 2020-08-05 PROCEDURE — 85025 COMPLETE CBC W/AUTO DIFF WBC: CPT

## 2020-08-05 PROCEDURE — 80048 BASIC METABOLIC PNL TOTAL CA: CPT

## 2020-08-05 PROCEDURE — 74011000258 HC RX REV CODE- 258: Performed by: INTERNAL MEDICINE

## 2020-08-05 PROCEDURE — 74011636637 HC RX REV CODE- 636/637: Performed by: INTERNAL MEDICINE

## 2020-08-05 PROCEDURE — 74011250636 HC RX REV CODE- 250/636: Performed by: INTERNAL MEDICINE

## 2020-08-05 PROCEDURE — 92526 ORAL FUNCTION THERAPY: CPT

## 2020-08-05 PROCEDURE — 82962 GLUCOSE BLOOD TEST: CPT

## 2020-08-05 PROCEDURE — 97530 THERAPEUTIC ACTIVITIES: CPT

## 2020-08-05 PROCEDURE — 65660000000 HC RM CCU STEPDOWN

## 2020-08-05 PROCEDURE — 86140 C-REACTIVE PROTEIN: CPT

## 2020-08-05 PROCEDURE — 74011250637 HC RX REV CODE- 250/637: Performed by: INTERNAL MEDICINE

## 2020-08-05 RX ORDER — SODIUM CHLORIDE 450 MG/100ML
50 INJECTION, SOLUTION INTRAVENOUS CONTINUOUS
Status: DISCONTINUED | OUTPATIENT
Start: 2020-08-05 | End: 2020-08-07 | Stop reason: HOSPADM

## 2020-08-05 RX ADMIN — Medication 10 ML: at 13:13

## 2020-08-05 RX ADMIN — INSULIN LISPRO 6 UNITS: 100 INJECTION, SOLUTION INTRAVENOUS; SUBCUTANEOUS at 05:54

## 2020-08-05 RX ADMIN — Medication 500 MG: at 16:19

## 2020-08-05 RX ADMIN — DEXTROSE MONOHYDRATE 75 ML/HR: 5 INJECTION, SOLUTION INTRAVENOUS at 02:26

## 2020-08-05 RX ADMIN — HEPARIN SODIUM 5000 UNITS: 5000 INJECTION INTRAVENOUS; SUBCUTANEOUS at 21:30

## 2020-08-05 RX ADMIN — Medication 10 ML: at 05:34

## 2020-08-05 RX ADMIN — INSULIN LISPRO 8 UNITS: 100 INJECTION, SOLUTION INTRAVENOUS; SUBCUTANEOUS at 16:19

## 2020-08-05 RX ADMIN — SODIUM CHLORIDE 50 ML/HR: 450 INJECTION, SOLUTION INTRAVENOUS at 13:18

## 2020-08-05 RX ADMIN — INSULIN LISPRO 6 UNITS: 100 INJECTION, SOLUTION INTRAVENOUS; SUBCUTANEOUS at 13:13

## 2020-08-05 RX ADMIN — DEXAMETHASONE SODIUM PHOSPHATE 6 MG: 10 INJECTION INTRAMUSCULAR; INTRAVENOUS at 08:26

## 2020-08-05 RX ADMIN — Medication 500 MG: at 21:31

## 2020-08-05 RX ADMIN — HEPARIN SODIUM 5000 UNITS: 5000 INJECTION INTRAVENOUS; SUBCUTANEOUS at 13:12

## 2020-08-05 RX ADMIN — Medication 10 ML: at 21:31

## 2020-08-05 RX ADMIN — HEPARIN SODIUM 5000 UNITS: 5000 INJECTION INTRAVENOUS; SUBCUTANEOUS at 05:34

## 2020-08-05 RX ADMIN — CEFTRIAXONE SODIUM 1 G: 1 INJECTION, POWDER, FOR SOLUTION INTRAMUSCULAR; INTRAVENOUS at 19:26

## 2020-08-05 RX ADMIN — INSULIN LISPRO 6 UNITS: 100 INJECTION, SOLUTION INTRAVENOUS; SUBCUTANEOUS at 21:30

## 2020-08-05 NOTE — CDMP QUERY
Pt admitted with ***. Pt noted to have ***. If possible, please document in the progress notes and discharge summary if you are evaluating and/or treating any of the following: ·         Gram negative pneumonia ·         Gram positive pneumonia ·         MRSA or MSSA pneumonia ·         Bacterial pneumonia ·         Viral pneumonia ·         Aspiration pneumonia ·         Hypostatic pneumonia ·         Other, please specify ·         Clinically unable to determine ·         Unknown The medical record reflects the following: 
 
Risk Factors: *** Clinical Indicators: *** Treatment: *** Note: CAP, HAP, and HCAP indicate where the pneumonia was acquired, not a specific type.

## 2020-08-05 NOTE — PROGRESS NOTES
Problem: Mobility Impaired (Adult and Pediatric)  Goal: *Acute Goals and Plan of Care (Insert Text)  Outcome: Progressing Towards Goal  Note: LTG:  (1.)Ms. Juanita Wang will move from supine to sit and sit to supine , scoot up and down, and roll side to side in bed with MINIMAL ASSIST within 7 treatment day(s). (2.)Ms. Juanita Wang will transfer from bed to chair and chair to bed with MODERATE ASSIST using the least restrictive device within 7 treatment day(s). (3.)Ms. Juanita Wang will maintain static/dynamic sitting x 15 minutes with SUPERVISION for improved balance within 7 treatment days. (4.)Ms. Juanita Wang will follow 50% commands for increased participation in therapeutic activity/exercises within 7 treatment days. ________________________________________________________________________________________________       PHYSICAL THERAPY: Daily Note and AM 8/5/2020  INPATIENT: PT Visit Days : 2  [place] / [place] / Product Type: Medicare /       NAME/AGE/GENDER: Tammie Trinidad is a 81 y.o. female   PRIMARY DIAGNOSIS: Acute metabolic encephalopathy [C95.18]  UTI (urinary tract infection) [N39.0]  KAILEY (acute kidney injury) ([facility] [address] [N17.9]  Hypernatremia [E87.0] KAILEY (acute kidney injury) ([facility] [address] KAILEY (acute kidney injury) ([facility] [address]       ICD-10: Treatment Diagnosis:    · Generalized Muscle Weakness (M62.81)  · Difficulty in walking, Not elsewhere classified (R26.2)   Precaution/Allergies:  Patient has no known allergies. ASSESSMENT:     Ms. Juanita Wang was supine in bed upon arrival and appeared more alert today. She performed supine to sit with mod-maxA and additional time. Pt continues to exhibit decreased command following and little-no verbalizations. Per pt's family she was wheelchair bound PTA but able to propel wheelchair with feet. She demonstrated fair-poor sitting balance with increased posterior lean. Pt then assisted back to supine with maxA and required totalA for scooting up in bed. Slight progress in balance and mobility today. This section established at most recent assessment   PROBLEM LIST (Impairments causing functional limitations):  1. Decreased Strength  2. Decreased ADL/Functional Activities  3. Decreased Transfer Abilities  4. Decreased Ambulation Ability/Technique  5. Decreased Balance  6. Decreased Activity Tolerance  7. Decreased Flexibility/Joint Mobility  8. Decreased Cognition   INTERVENTIONS PLANNED: (Benefits and precautions of physical therapy have been discussed with the patient.)  1. Balance Exercise  2. Bed Mobility  3. Family Education  4. Gait Training  5. Home Exercise Program (HEP)  6. Neuromuscular Re-education/Strengthening  7. Therapeutic Activites  8. Therapeutic Exercise/Strengthening  9. Transfer Training     TREATMENT PLAN: Frequency/Duration: 3 times a week for 5 treatment days trial period  Rehabilitation Potential For Stated Goals: [facility] (at time of discharge pending progress):    Placement: It is my opinion, based on this patient's performance to date, that Ms. Emerson Lei may benefit from intensive therapy at a [address] [place] [address] after discharge due to the functional deficits listed above that are likely to improve with skilled rehabilitation and concerns that he/she may be unsafe to be unsupervised at home due to decreased balance and functional mobility . Equipment:    None at this time              HISTORY:   History of Present Injury/Illness (Reason for Referral): KAILEY  Past Medical History/Comorbidities:   Ms. Emerson Lei  has a past medical history of Benign hypertension, Controlled type 2 diabetes mellitus with diabetic autonomic neuropathy, with long-term current use of insulin ([address] Edema, and Hyperlipidemia. Ms. Emerson Lei  has a past surgical history that includes hx coronary artery bypass graft (2005); hx knee arthroscopy (Bilateral); hx amputation (Left); and hx partial hysterectomy.   Social History/Living Environment: Home Environment: Skilled nursing facility  Prior Level of Function/Work/Activity:  Lives at SNF per chart. PLOF unknown given pt AMS. Number of Personal Factors/Comorbidities that affect the Plan of Care: 1-2: MODERATE COMPLEXITY   EXAMINATION:   Most Recent Physical Functioning:   Gross Assessment:  AROM: Generally decreased, functional(L > R)  Strength: Grossly decreased, non-functional  Coordination: Grossly decreased, non-functional  Tone: Abnormal(increased L LE knee extensor tone)               Posture:     Balance:  Sitting: Impaired  Sitting - Static: Fair (occasional); Poor (constant support)  Sitting - Dynamic: Poor (constant support) Bed Mobility:  Rolling: Moderate assistance  Supine to Sit: Moderate assistance;Maximum assistance  Sit to Supine: Maximum assistance  Scooting: Total assistance  Interventions: Manual cues; Safety awareness training;Verbal cues; Visual cues  Wheelchair Mobility:     Transfers:     Gait:            Body Structures Involved:  1. Lungs  2. Metabolic  3. Endocrine  4. Muscles Body Functions Affected:  1. Respiratory  2. Neuromusculoskeletal  3. Movement Related  4. Metobolic/Endocrine Activities and Participation Affected:  1. Learning and Applying Knowledge  2. General Tasks and Demands  3. Communication  4. Mobility  5. Self Care  6. Domestic Life  7. Interpersonal Interactions and Relationships  8. Community, Social and [place]   Number of elements that affect the Plan of Care: 4+: HIGH COMPLEXITY   CLINICAL PRESENTATION:   Presentation: Stable and uncomplicated: LOW COMPLEXITY   CLINICAL DECISION MAKIN [facility] Mobility Inpatient Short Form  How much difficulty does the patient currently have. .. Unable A Lot A Little None   1. Turning over in bed (including adjusting bedclothes, sheets and blankets)? [] 1   [x] 2   [] 3   [] 4   2.   Sitting down on and standing up from a chair with arms ( e.g., wheelchair, bedside commode, etc.)   [x] 1   [] 2   [] 3   [] 4   3. Moving from lying on back to sitting on the side of the bed? [] 1   [x] 2   [] 3   [] 4   How much help from another person does the patient currently need. .. Total A Lot A Little None   4. Moving to and from a bed to a chair (including a wheelchair)? [x] 1   [] 2   [] 3   [] 4   5. Need to walk in hospital room? [x] 1   [] 2   [] 3   [] 4   6. Climbing 3-5 steps with a railing? [x] 1   [] 2   [] 3   [] 4   © 2007, Trustees of [address] [place] [address] [address] [id number], under license to [employer]. All rights reserved      Score:  Initial: 8 Most Recent: X (Date: -- )    Interpretation of Tool:  Represents activities that are increasingly more difficult (i.e. Bed mobility, Transfers, Gait). Medical Necessity:     · Patient demonstrates   · fair  ·  rehab potential due to higher previous functional level. Reason for Services/Other Comments:  · Patient continues to require skilled intervention due to   · Decreased mobility and balance  · . Use of outcome tool(s) and clinical judgement create a POC that gives a: Clear prediction of patient's progress: LOW COMPLEXITY            TREATMENT:   (In addition to Assessment/Re-Assessment sessions the following treatments were rendered)   Pre-treatment Symptoms/Complaints:  Unable to verbalize  Pain: Initial:   Pain Intensity 1: 0  Post Session:  0     Therapeutic Activity: (    10 minutes): Therapeutic activities including rolling, supine to sit <>, and sitting EOB activities to improve mobility, strength, balance, and coordination. Required mod-max cuing   to promote static and dynamic balance in sitting and promote coordination of bilateral, upper extremity(s), lower extremity(s).          Braces/Orthotics/Lines/Etc:   · IV  · pereyra catheter  · O2 Device: Nasal cannula  Treatment/Session Assessment:    · Response to Treatment:  See above  · Interdisciplinary Collaboration:   o Physical Therapist  o Registered Nurse  · After treatment position/precautions:   o Supine in bed  o Bed alarm/tab alert on  o Bed/Chair-wheels locked  o Bed in low position  o RN notified  o Side rails x 3   · Compliance with Program/Exercises: Will assess as treatment progresses  · Recommendations/Intent for next treatment session: \"Next visit will focus on advancements to more challenging activities and reduction in assistance provided\".   Total Treatment Duration:  PT Patient Time In/Time Out  Time In: 1141  Time Out: [address] Dorcus Bloch, PT, DPT

## 2020-08-05 NOTE — PROGRESS NOTES
Problem: Dysphagia (Adult)  Goal: *Acute Goals and Plan of Care (Insert Text)  Outcome: Progressing Towards Goal  Note: LTG: Patient will tolerate least restrictive diet without overt signs or symptoms of airway compromise. STG: Patient will participate in po trials with ST in efforts to identify safest, least restrictive oral diet  STG: Patient will participate in modified barium swallow study as clinically indicated. SPEECH LANGUAGE PATHOLOGY: DYSPHAGIA- Daily Note  2  NAME/AGE/GENDER: Eleonora Mar is a 81 y.o. female  DATE: 8/5/2020  PRIMARY DIAGNOSIS: Acute metabolic encephalopathy [K00.47]  UTI (urinary tract infection) [N39.0]  KAILEY (acute kidney injury) ([facility] [address] [N17.9]  Hypernatremia [E87.0]      ICD-10: Treatment Diagnosis: R13.12 Dysphagia, Oropharyngeal Phase    RECOMMENDATIONS   DIET:    NPO with frequent oral care     MEDICATIONS: Crushed in puree     ASPIRATION PRECAUTIONS  · Slow rate of intake  · Small bites/sips  · Upright at 90 degrees during meal  · oral care     COMPENSATORY STRATEGIES/MODIFICATIONS  · Verbal cues to swallow   · Fully awake/alert  · Check for pocketing      EDUCATION:  · Recommendations discussed with Nursing and patient  · Notified MD via perfectserve     CONTINUATION OF SKILLED SERVICES/MEDICAL NECESSITY:   Patient is expected to demonstrate progress in  swallow strength, swallow timeliness, swallow function and swallow safety in order to  improve swallow safety, work toward diet advancement and decrease aspiration risk.  Patient continues to require skilled intervention due to dysphagia. RECOMMENDATIONS for CONTINUED SPEECH THERAPY:   YES: Anticipate need for ongoing speech therapy during this hospitalization. ASSESSMENT   Patient demonstrates improving swallow function as level of alertness improves. However, ongoing significant oral dysphagia with all po intake and s/sx pharyngeal dysphagia with thin liquids via straw.  Patient requires significant verbal and tactile cueing with all po trials, but increased labial receipt, acceptance, containment, and manipulation of trials. Oral phase continues to be prolonged, slowed manipulation and intermittent bolus holding, but increased manipulation and response to cues compared to prior sessions. Overt s/sx, weak coughing, with thin liquids via straw. No overt s/sx airway compromise with small sips of nectar thick liquid by cup, but certainly remains increased risk of aspiration given prolonged bolus holding and fluctuating levels of alertness. Recommend continue NPO. Frequent oral care. meds crushed in puree if awake/alert with verbal cues. Still not appropriate to initiate po diet as patient remains at increased risk of aspiration due to fluctuating level of alertness and amount of cueing required, however anticipate ability to initiate in next day or two as alertness and overall tolerance of trials improving. Will follow up tomorrow. Notified MD. Discussed with RN. COMPLIANCE WITH PROGRAM/EXERCISES: Will assess as treatment progresses  REHABILITATION POTENTIAL FOR STATED GOALS: Good    PLAN    FREQUENCY/DURATION: Continue to follow patient 3 times a week for duration of hospital stay to address above goals. - Recommendations for next treatment session: Next treatment will address po trials    SUBJECTIVE   Awake. Speech unintelligible. More alert with increased ability to participate compared to prior sessions. smiling some today. Increased basic command following. Problem List:  (Impairments causing functional limitations):  1. Oropharyngeal dysphagia    Orientation:   Nodded \"yes\" to her name     Pain: Pain Scale 1: FLACC  Pain Intensity 1: 0    OBJECTIVE   Patient seen for po trials. Secretions pooling in floor of mouth upon entry. Suctioned with yankeur. Dentures present this date. Presented with ice chips, thin liquids (tsp, cup, straw), puree, and nectar thick liquids by cup.  Improved acceptance and containment. Prolonged oral phase with slowed manipulation of ice chip and puree with intermittent bolus holding. Verbal and occasionally tactile cues to elicit swallow, but responded to cues this date. Difficulty accepting thin by cup due to coordination impacting rate/volume. Tolerated small single sips thin without overt s/sx, but unable to assess larger volume by cup. Overt s/sx airway compromise with thin liquids by straw, weak coughing. No overt s/sx airway compromise with puree or nectar thick liquids by cup. Patient holding cup and controlling volume/rate with nectar thick liquids which appeared to improve timing with reduced bolus holding. Min oral residue with puree after ~3oz. Single swallow per bite/sip with no overt s/sx pharyngeal residue. Vocal quality baseline. INTERDISCIPLINARY COLLABORATION: Registered Nurse  PRECAUTIONS/ALLERGIES: Patient has no known allergies. Tool Used: Dysphagia Outcome and Severity Scale (GERALD)    Score Comments   Normal Diet  [] 7 With no strategies or extra time needed   Functional Swallow  [] 6 May have mild oral or pharyngeal delay   Mild Dysphagia  [] 5 Which may require one diet consistency restricted    Mild-Moderate Dysphagia  [] 4 With 1-2 diet consistencies restricted   Moderate Dysphagia  [] 3 With 2 or more diet consistencies restricted   Moderate-Severe Dysphagia  [] 2 With partial PO strategies (trials with ST only)   Severe Dysphagia  [] 1 With inability to tolerate any PO safely      Score:  Initial: 1 Most Recent: 2 (Date 08/05/20 )   Interpretation of Tool: The Dysphagia Outcome and Severity Scale (GERALD) is a simple, easy-to-use, 7-point scale developed to systematically rate the functional severity of dysphagia based on objective assessment and make recommendations for diet level, independence level, and type of nutrition.      After treatment position/precautions:  · Upright in bed  · RN notified  · Call light within reach    Total Treatment Duration:   Time In: 1315  Time Out: [address], Breezy Dickinson 43., CCC-SLP

## 2020-08-05 NOTE — PROGRESS NOTES
Pt is resting in bed. Respirations present on 1L NC. No signs of distress. No needs expressed. Bed low and locked. Call light within reach.  Report received form Kirsty Doty RN

## 2020-08-05 NOTE — PROGRESS NOTES
Nutrition Note    Remains NPO per SLP evaluation and recommendation. Mentation remains significant factor in dysphagia. Communicated with Dr Henok Forbes via Perfect serve concerning consideration of TF is next 1-2 days. Response: \"Ill see how she does by tomorrow in terms of NPO vs TF\"    If TF is pursued, then MD will order TF consult for TF management.       Electronically signed by Kassidy Lopez RD on 8/5/2020 at 1:36 PM    Contact: Silvino Beltran, [address] [place] [address], [address] [place] [address]

## 2020-08-05 NOTE — PROGRESS NOTES
Pt is resting in bed. Respirations present on 1L NC. No signs of distress. No needs expressed. Bed low and locked. Call light within reach.  Report given to Valentino Cannon

## 2020-08-05 NOTE — PROGRESS NOTES
Name: Klaudia Montiel MRN: [id number]  : 1935  Age:85 y.o.  female  Admit Date:  2020 LOS: 3      Hospitalist Progress Note    Klaudia Montiel is a 81 y.o. female with medical history significant for diabetes, hypertension who was admitted to ED on 2020 due to altered mental status. Was found to have UTI, acute kidney injury, hyponatremia on admission. Patient was noted to be desaturating to 88% on room air on 8/3. COVID rapid test is positive. Subjective (20) :  Patient is seen and examined at bedside. No acute events reported overnight by nursing staff. More alert and awake today. Follows simple commands but still not verbal.   Last temp spike of 101. 1F was on  at 7:30am   Weaned down to 1L O2 NC with saturations > 95%. ROS:  Unable to assess due to clinical condition    Objective:    Patient Vitals for the past 24 hrs:   Temp Pulse Resp BP SpO2   20 1126 97.9 °F (36.6 °C) 69 18 148/80 97 %   20 0755 98.1 °F (36.7 °C) 71 18 156/81 98 %   20 0400  66      20 0348 98.2 °F (36.8 °C) 64 20 134/82 100 %   20 0000  62      20 2247 98.9 °F (37.2 °C) 70 16 139/53 97 %   20 99.6 °F (37.6 °C) 68 18 (!) 132/99 95 %   20  64      20 1501 98.2 °F (36.8 °C) 73 18 139/60 95 %     Oxygen Therapy  O2 Sat (%): 97 % (20 1126)  Pulse via Oximetry: 68 beats per minute (20 1601)  O2 Device: Nasal cannula(present on am assessment) (20 0745)  O2 Flow Rate (L/min): 1 l/min(present on am assessment) (20 0745)    Estimated body mass index is 34.95 kg/m² as calculated from the following:    Height as of this encounter: 5' 5\" (1.651 m). Weight as of this encounter: 95.3 kg (210 lb).       Intake/Output Summary (Last 24 hours) at 2020 1252  Last data filed at 2020 1250  Gross per 24 hour   Intake 1572 ml   Output 1350 ml   Net 222 ml       *Note that automatically entered I/Os may not be accurate; dependent on patient compliance with collection and accurate  by techs. Physical Exam:   General:     awake, no acute distress. Not answering questions. Well nourished. Obese. Head:   normocephalic, atraumatic  Eyes, Ears, nose: PERRL. Normal conjunctiva  Neck:    supple, non-tender. Trachea midline. Lungs:   CTAB, no wheezing, rhonchi, rales  Cardiac:   RRR, Normal S1 and S2. Abdomen:   Soft, non distended, nontender, +BS  Extremities:   Warm, dry. No edema   Skin:   No rashes, no jaundice  Neuro:  Awake, opens eyes and tract.      Data Review:  I have reviewed all labs, meds, and studies from the last 24 hours:    Recent Results (from the past 24 hour(s))   GLUCOSE, POC    Collection Time: 08/04/20  4:25 PM   Result Value Ref Range    Glucose (POC) 218 (H) 65 - 100 mg/dL   GLUCOSE, POC    Collection Time: 08/04/20  4:54 PM   Result Value Ref Range    Glucose (POC) 224 (H) 65 - 100 mg/dL   GLUCOSE, POC    Collection Time: 08/04/20  9:05 PM   Result Value Ref Range    Glucose (POC) 225 (H) 65 - 100 mg/dL   C REACTIVE PROTEIN, QT    Collection Time: 08/05/20  5:06 AM   Result Value Ref Range    C-Reactive protein 5.3 (H) 0.0 - 0.9 mg/dL   METABOLIC PANEL, BASIC    Collection Time: 08/05/20  5:06 AM   Result Value Ref Range    Sodium 140 136 - 145 mmol/L    Potassium 3.8 3.5 - 5.1 mmol/L    Chloride 109 (H) 98 - 107 mmol/L    CO2 24 21 - 32 mmol/L    Anion gap 7 7 - 16 mmol/L    Glucose 310 (H) 65 - 100 mg/dL    BUN 18 8 - 23 MG/DL    Creatinine 1.12 (H) 0.6 - 1.0 MG/DL    GFR est AA 60 (L) >60 ml/min/1.73m2    GFR est non-AA 49 (L) >60 ml/min/1.73m2    Calcium 9.1 8.3 - 10.4 MG/DL   CBC WITH AUTOMATED DIFF    Collection Time: 08/05/20  5:06 AM   Result Value Ref Range    WBC 5.6 4.3 - 11.1 K/uL    RBC 4.11 4.05 - 5.2 M/uL    HGB 11.7 11.7 - 15.4 g/dL    HCT 35.4 (L) 35.8 - 46.3 %    MCV 86.1 79.6 - 97.8 FL    MCH 28.5 26.1 - 32.9 PG    MCHC 33.1 31.4 - 35.0 g/dL    RDW 12.9 11.9 - 14.6 %    PLATELET 513 362 - 491 K/uL    MPV 12.8 (H) 9.4 - 12.3 FL    ABSOLUTE NRBC 0.00 0.0 - 0.2 K/uL    NEUTROPHILS 76 43 - 78 %    LYMPHOCYTES 20 13 - 44 %    MONOCYTES 3 (L) 4.0 - 12.0 %    EOSINOPHILS 0 (L) 0.5 - 7.8 %    BASOPHILS 0 0.0 - 2.0 %    IMMATURE GRANULOCYTES 1 0.0 - 5.0 %    ABS. NEUTROPHILS 4.2 1.7 - 8.2 K/UL    ABS. LYMPHOCYTES 1.1 0.5 - 4.6 K/UL    ABS. MONOCYTES 0.2 0.1 - 1.3 K/UL    ABS. EOSINOPHILS 0.0 0.0 - 0.8 K/UL    ABS. BASOPHILS 0.0 0.0 - 0.2 K/UL    ABS. IMM.  GRANS. 0.1 0.0 - 0.5 K/UL    RBC COMMENTS NORMOCYTIC/NORMOCHROMIC      WBC COMMENTS Result Confirmed By Smear      PLATELET COMMENTS ADEQUATE      DF AUTOMATED     GLUCOSE, POC    Collection Time: 08/05/20  5:43 AM   Result Value Ref Range    Glucose (POC) 287 (H) 65 - 100 mg/dL        All Micro Results     Procedure Component Value Units Date/Time    CULTURE, BLOOD [[id number]] Collected:  08/02/20 1427    Order Status:  Completed Specimen:  Blood Updated:  08/05/20 1032     Special Requests: --        LEFT  Antecubital       Culture result: NO GROWTH 3 DAYS       CULTURE, BLOOD [[id number]] Collected:  08/02/20 1430    Order Status:  Completed Specimen:  Blood Updated:  08/05/20 1032     Special Requests: --        RIGHT  FOREARM       Culture result: NO GROWTH 3 DAYS       CULTURE, URINE [[id number]] Collected:  08/02/20 1427    Order Status:  Completed Specimen:  Cath Urine Updated:  08/05/20 0637     Special Requests: NO SPECIAL REQUESTS        Culture result:       >100,000 COLONIES/mL MIXED SKIN ABDIRASHID ISOLATED          CULTURE, URINE [[id number]]     Order Status:  Canceled Specimen:  Cath Urine           Current Meds:  Current Facility-Administered Medications   Medication Dose Route Frequency    0.45% sodium chloride infusion  50 mL/hr IntraVENous CONTINUOUS    lip protectant (BLISTEX) ointment 1 Each  1 Each Topical PRN    famotidine (PEPCID) tablet 20 mg  20 mg Oral DAILY PRN    ascorbic acid (vitamin C) (VITAMIN C) tablet 500 mg  500 mg Oral TID    zinc sulfate tablet 220 mg  220 mg Oral DAILY    dexamethasone (DECADRON) 10 mg/mL injection 6 mg  6 mg IntraVENous DAILY    [Held by provider] insulin glargine (LANTUS) injection 20 Units  20 Units SubCUTAneous QHS    [Held by provider] rosuvastatin (CRESTOR) tablet 20 mg  20 mg Oral QHS    insulin lispro (HUMALOG) injection   SubCUTAneous AC&HS    dextrose 40% (GLUTOSE) oral gel 1 Tube  15 g Oral PRN    glucagon (GLUCAGEN) injection 1 mg  1 mg IntraMUSCular PRN    dextrose (D50W) injection syrg 12.5-25 g  25-50 mL IntraVENous PRN    sodium chloride (NS) flush 5-40 mL  5-40 mL IntraVENous Q8H    sodium chloride (NS) flush 5-40 mL  5-40 mL IntraVENous PRN    acetaminophen (TYLENOL) tablet 650 mg  650 mg Oral Q6H PRN    Or    acetaminophen (TYLENOL) suppository 650 mg  650 mg Rectal Q6H PRN    polyethylene glycol (MIRALAX) packet 17 g  17 g Oral DAILY PRN    bisacodyL (DULCOLAX) suppository 10 mg  10 mg Rectal DAILY PRN    ondansetron (ZOFRAN ODT) tablet 4 mg  4 mg Oral Q8H PRN    Or    ondansetron (ZOFRAN) injection 4 mg  4 mg IntraVENous Q6H PRN    potassium chloride (KLOR-CON) tablet 40 mEq  40 mEq Oral PRN    Or    potassium bicarb-citric acid (EFFER-K) tablet 40 mEq  40 mEq Oral PRN    Or    potassium chloride 10 mEq in 100 ml IVPB  10 mEq IntraVENous PRN    potassium chloride 10 mEq in 100 ml IVPB  10 mEq IntraVENous PRN    magnesium sulfate 2 g/50 ml IVPB (premix or compounded)  2 g IntraVENous PRN    heparin (porcine) injection 5,000 Units  5,000 Units SubCUTAneous Q8H    guaiFENesin ER (MUCINEX) tablet 1,200 mg  1,200 mg Oral BID PRN    hydrALAZINE (APRESOLINE) 20 mg/mL injection 20 mg  20 mg IntraVENous Q4H PRN    alum-mag hydroxide-simeth (MYLANTA) oral suspension 30 mL  30 mL Oral Q4H PRN    oxyCODONE IR (ROXICODONE) tablet 5 mg  5 mg Oral Q4H PRN    oxyCODONE IR (ROXICODONE) tablet 10 mg  10 mg Oral Q4H PRN    guaiFENesin-dextromethorphan (ROBITUSSIN DM) 100-10 mg/5 mL syrup 10 mL  10 mL Oral Q4H PRN    senna-docusate (PERICOLACE) 8.6-50 mg per tablet 2 Tab  2 Tab Oral DAILY PRN    hydrOXYzine pamoate (VISTARIL) capsule 50 mg  50 mg Oral QID PRN    LORazepam (ATIVAN) tablet 0.5 mg  0.5 mg Oral Q4H PRN    mirtazapine (REMERON) tablet 15 mg  15 mg Oral QHS PRN    cefTRIAXone (ROCEPHIN) 1 g in 0.9% sodium chloride (MBP/ADV) 50 mL  1 g IntraVENous Q24H         Other Studies:  Ct Head Wo Cont    Result Date: 8/2/2020  Exam: CT head without contrast. Indication: Altered mental status. Comparison: None. Multiple axial images obtained through the brain without intravenous contrast. Radiation dose reduction techniques were used for this study: All CT scans performed at this facility use one or all of the following: Automated exposure control, adjustment of the mA and/or kVp according to patient's size, iterative reconstruction. FINDINGS: Prominent chronic mineralization of the dentate nuclei of the cerebellum and of the basal ganglia. Diffuse cerebral atrophy with commensurate ex vacuo dilatation of the ventricles. Scattered periventricular and centrum semiovale white matter hypointensities most indicative of chronic ischemic white matter change. No evidence of intracranial mass, hemorrhage, or large territorial infarct. The basal cisterns are patent. No extra-axial fluid collection or mass effect. Left lens replacement. The paranasal sinuses are clear. The mastoid air cells and middle ears are clear. No significant osseous or extracranial soft tissue lesions. IMPRESSION: 1. No evidence of acute intracranial abnormality. 2. Prominent chronic mineralization of the basal ganglia and dentate nuclei likely senescent change however recommend correlation for possible hyperparathyroidism or pseudohyperparathyroidism. Xr Chest Port    Result Date: 8/2/2020  EXAM: Single view chest radiograph. INDICATION: Altered mental status. COMPARISON: None. FINDINGS: Hypoventilatory exam with bronchovascular crowding and bibasilar atelectasis. Cardiomegaly evident. Sternal wires appear intact. Mediastinal surgical clips evident. Atherosclerotic aortic arch. Advanced right shoulder joint degenerative changes. IMPRESSION: 1. Hypoventilatory exam with bronchovascular crowding and bibasilar atelectasis. 2. Cardiomegaly status post median sternotomy. Assessment:    Active Hospital Problems    Diagnosis Date Noted    Sepsis ([facility] 08/04/2020    Hypernatremia 08/02/2020    UTI (urinary tract infection) 08/02/2020    KAILEY (acute kidney injury) ([facility] 08/02/2020    Acute metabolic encephalopathy 61/23/5452    Alzheimer's dementia with behavioral disturbance ([facility] 08/02/2020    DNR (do not resuscitate) 08/02/2020    Diabetes mellitus, type II ([facility]     Benign hypertension        Plan:    Acute metabolic encephalopathy likely multifactorial (UTI, Hypernatremia, hypoxia)  - treat underlying condition as below  - NPO for now  - SLP following    Acute respiratory failure with Hypoxia (from UTI and COVID 19 infection, ?aspiration)  Sepsis(present on admission) due to UTI +/- COVID-19 Infection  COVID-19 Infection  UTI  Meets criteria with tachycardia and hypoxia on 8/3 (88% on room air with HR > 100)  Rapid COVID-19 test is positive on 8/3. Requiring 1L O2 with sat > 95%. - dexamethasone 6mg daily  - zinc and vitamin C supplment  - O2 supplement as needed and wean as tolerated  - continue with rocephin for now  - follow-up UCx and BCx    Hypernatremia (resolved)   Hypertension: hold home medications for now    KAILEY (improving)  - continue with ivf    T2DM  - hold diabetic medications for now given NPO    Alzheimer's dementia with behavioral disturbance    Spoke with Letty (Daughter) and updated. Diet:  DIET NPO  DVT PPx: heparin sq  Code: DNR  Dispo: SNF  Estimated Discharge: TBD based on clinical course    Labs/Imaging Reviewed.    Patient is high risk due to current condition and comorbid conditions as well as requiring frequent monitoring. Plan discussed with staff, patient/family and are in agreement. Time Spent: Greater than 45 minutes was spent in reviewing charts, physical exam, discussion with patient, and answered any questions.     Signed By: Rodney Kaplan MD     August 5, 2020

## 2020-08-06 LAB
ANION GAP SERPL CALC-SCNC: 8 MMOL/L (ref 7–16)
BASOPHILS # BLD: 0 K/UL (ref 0–0.2)
BASOPHILS NFR BLD: 0 % (ref 0–2)
BUN SERPL-MCNC: 15 MG/DL (ref 8–23)
CALCIUM SERPL-MCNC: 9.5 MG/DL (ref 8.3–10.4)
CHLORIDE SERPL-SCNC: 109 MMOL/L (ref 98–107)
CO2 SERPL-SCNC: 24 MMOL/L (ref 21–32)
CREAT SERPL-MCNC: 0.82 MG/DL (ref 0.6–1)
CRP SERPL-MCNC: 2.2 MG/DL (ref 0–0.9)
DIFFERENTIAL METHOD BLD: ABNORMAL
EOSINOPHIL # BLD: 0 K/UL (ref 0–0.8)
EOSINOPHIL NFR BLD: 0 % (ref 0.5–7.8)
ERYTHROCYTE [DISTWIDTH] IN BLOOD BY AUTOMATED COUNT: 12.3 % (ref 11.9–14.6)
GLUCOSE BLD STRIP.AUTO-MCNC: 221 MG/DL (ref 65–100)
GLUCOSE BLD STRIP.AUTO-MCNC: 233 MG/DL (ref 65–100)
GLUCOSE BLD STRIP.AUTO-MCNC: 236 MG/DL (ref 65–100)
GLUCOSE BLD STRIP.AUTO-MCNC: 273 MG/DL (ref 65–100)
GLUCOSE SERPL-MCNC: 228 MG/DL (ref 65–100)
HCT VFR BLD AUTO: 34.9 % (ref 35.8–46.3)
HGB BLD-MCNC: 11.9 G/DL (ref 11.7–15.4)
IMM GRANULOCYTES # BLD AUTO: 0.2 K/UL (ref 0–0.5)
IMM GRANULOCYTES NFR BLD AUTO: 2 % (ref 0–5)
LYMPHOCYTES # BLD: 1.4 K/UL (ref 0.5–4.6)
LYMPHOCYTES NFR BLD: 13 % (ref 13–44)
MAGNESIUM SERPL-MCNC: 1.9 MG/DL (ref 1.8–2.4)
MCH RBC QN AUTO: 28.3 PG (ref 26.1–32.9)
MCHC RBC AUTO-ENTMCNC: 34.1 G/DL (ref 31.4–35)
MCV RBC AUTO: 83.1 FL (ref 79.6–97.8)
MONOCYTES # BLD: 0.7 K/UL (ref 0.1–1.3)
MONOCYTES NFR BLD: 6 % (ref 4–12)
NEUTS SEG # BLD: 8.6 K/UL (ref 1.7–8.2)
NEUTS SEG NFR BLD: 79 % (ref 43–78)
NRBC # BLD: 0 K/UL (ref 0–0.2)
PLATELET # BLD AUTO: 196 K/UL (ref 150–450)
PMV BLD AUTO: 13.5 FL (ref 9.4–12.3)
POTASSIUM SERPL-SCNC: 3.1 MMOL/L (ref 3.5–5.1)
RBC # BLD AUTO: 4.2 M/UL (ref 4.05–5.2)
SODIUM SERPL-SCNC: 141 MMOL/L (ref 136–145)
WBC # BLD AUTO: 10.9 K/UL (ref 4.3–11.1)

## 2020-08-06 PROCEDURE — 74011636637 HC RX REV CODE- 636/637: Performed by: INTERNAL MEDICINE

## 2020-08-06 PROCEDURE — 74011250636 HC RX REV CODE- 250/636: Performed by: INTERNAL MEDICINE

## 2020-08-06 PROCEDURE — 86140 C-REACTIVE PROTEIN: CPT

## 2020-08-06 PROCEDURE — 92526 ORAL FUNCTION THERAPY: CPT

## 2020-08-06 PROCEDURE — 74011250637 HC RX REV CODE- 250/637: Performed by: INTERNAL MEDICINE

## 2020-08-06 PROCEDURE — 65660000000 HC RM CCU STEPDOWN

## 2020-08-06 PROCEDURE — 74011000258 HC RX REV CODE- 258: Performed by: INTERNAL MEDICINE

## 2020-08-06 PROCEDURE — 36415 COLL VENOUS BLD VENIPUNCTURE: CPT

## 2020-08-06 PROCEDURE — 85025 COMPLETE CBC W/AUTO DIFF WBC: CPT

## 2020-08-06 PROCEDURE — 80048 BASIC METABOLIC PNL TOTAL CA: CPT

## 2020-08-06 PROCEDURE — 82962 GLUCOSE BLOOD TEST: CPT

## 2020-08-06 PROCEDURE — 83735 ASSAY OF MAGNESIUM: CPT

## 2020-08-06 RX ORDER — POTASSIUM CHLORIDE 14.9 MG/ML
20 INJECTION INTRAVENOUS
Status: COMPLETED | OUTPATIENT
Start: 2020-08-06 | End: 2020-08-06

## 2020-08-06 RX ADMIN — INSULIN LISPRO 4 UNITS: 100 INJECTION, SOLUTION INTRAVENOUS; SUBCUTANEOUS at 17:14

## 2020-08-06 RX ADMIN — Medication 10 ML: at 15:03

## 2020-08-06 RX ADMIN — Medication 500 MG: at 09:12

## 2020-08-06 RX ADMIN — SODIUM CHLORIDE 50 ML/HR: 450 INJECTION, SOLUTION INTRAVENOUS at 09:13

## 2020-08-06 RX ADMIN — HEPARIN SODIUM 5000 UNITS: 5000 INJECTION INTRAVENOUS; SUBCUTANEOUS at 15:03

## 2020-08-06 RX ADMIN — DEXAMETHASONE SODIUM PHOSPHATE 6 MG: 10 INJECTION INTRAMUSCULAR; INTRAVENOUS at 09:12

## 2020-08-06 RX ADMIN — HEPARIN SODIUM 5000 UNITS: 5000 INJECTION INTRAVENOUS; SUBCUTANEOUS at 05:50

## 2020-08-06 RX ADMIN — HEPARIN SODIUM 5000 UNITS: 5000 INJECTION INTRAVENOUS; SUBCUTANEOUS at 21:34

## 2020-08-06 RX ADMIN — CEFTRIAXONE SODIUM 1 G: 1 INJECTION, POWDER, FOR SOLUTION INTRAMUSCULAR; INTRAVENOUS at 19:18

## 2020-08-06 RX ADMIN — INSULIN LISPRO 4 UNITS: 100 INJECTION, SOLUTION INTRAVENOUS; SUBCUTANEOUS at 11:30

## 2020-08-06 RX ADMIN — Medication 220 MG: at 09:12

## 2020-08-06 RX ADMIN — INSULIN LISPRO 4 UNITS: 100 INJECTION, SOLUTION INTRAVENOUS; SUBCUTANEOUS at 06:47

## 2020-08-06 RX ADMIN — POTASSIUM CHLORIDE 20 MEQ: 200 INJECTION, SOLUTION INTRAVENOUS at 12:59

## 2020-08-06 RX ADMIN — POTASSIUM CHLORIDE 20 MEQ: 200 INJECTION, SOLUTION INTRAVENOUS at 15:03

## 2020-08-06 RX ADMIN — INSULIN LISPRO 6 UNITS: 100 INJECTION, SOLUTION INTRAVENOUS; SUBCUTANEOUS at 21:35

## 2020-08-06 RX ADMIN — Medication 10 ML: at 21:36

## 2020-08-06 RX ADMIN — Medication 10 ML: at 05:01

## 2020-08-06 NOTE — PROGRESS NOTES
Pt is resting in bed. Respirations present on RA. No signs of distress. No needs expressed. Bed low and locked. Call light within reach.  Report received from FARTUN, [address]

## 2020-08-06 NOTE — PROGRESS NOTES
Problem: Patient Education: Go to Patient Education Activity  Goal: Patient/Family Education  Outcome: Progressing Towards Goal     Problem: Falls - Risk of  Goal: *Absence of Falls  Description: Document Jovan Wynn Fall Risk and appropriate interventions in the flowsheet.   Outcome: Progressing Towards Goal  Note: Fall Risk Interventions:       Mentation Interventions: Bed/chair exit alarm    Medication Interventions: Bed/chair exit alarm    Elimination Interventions: Bed/chair exit alarm

## 2020-08-06 NOTE — PROGRESS NOTES
Continuing to plan for DC to [facility]. Staffed case with hospitalist on this date. Currently awaiting ST swallowing evaluation. Hopeful for medical readiness to DC tomorrow. Vilma Laird, liaison at [facility], has been updated.

## 2020-08-06 NOTE — PROGRESS NOTES
Pt resting in bed with eyes opened. Pt alert but unable to assess orientation. Pt mumbles some words but hard to understand. Pt on 1 L NC at this time with SCDs in place. Pt on droplet plus precautions. Pt has no s/sx of distress noted at this time. Pt encouraged to call for assistance if needed call light in reach, posey pad in place. Will monitor.

## 2020-08-06 NOTE — PROGRESS NOTES
Pt is resting in bed. Respirations present on 1L NC. No signs of distress. No needs expressed. Bed low and locked. Call light within reach.  Report given to Louis Ba

## 2020-08-06 NOTE — PROGRESS NOTES
Pt sitting up in bed. Pt on 1  l NC. Pt remains confused. No distress noted at this time. Call light in reach, will monitor. Posey pad in place.

## 2020-08-06 NOTE — PROGRESS NOTES
Problem: Dysphagia (Adult)  Goal: *Acute Goals and Plan of Care (Insert Text)  Outcome: Progressing Towards Goal  Note: LTG: Patient will tolerate least restrictive diet without overt signs or symptoms of airway compromise. STG: Patient will participate in po trials with ST in efforts to identify safest, least restrictive oral diet MET 8/6/20  STG: Patient will tolerate clear liquid diet without overt s/sx of airway compromise. ADDED 8/6/20  STG: Patient will participate in modified barium swallow study as clinically indicated. SPEECH LANGUAGE PATHOLOGY: DYSPHAGIA- Daily Note 3  NAME/AGE/GENDER: Eleonora Vasquez is a 81 y.o. female  DATE: 8/6/2020  PRIMARY DIAGNOSIS: Acute metabolic encephalopathy [Z12.81]  UTI (urinary tract infection) [N39.0]  [facility] [N17.9]  Hypernatremia [E87.0]      ICD-10: Treatment Diagnosis: R13.12 Dysphagia, Oropharyngeal Phase    RECOMMENDATIONS   DIET:    Liquids:  regular thin    Clear liquids     MEDICATIONS: Crushed in puree; follow with liquid wash     ASPIRATION PRECAUTIONS  · Slow rate of intake  · Small bites/sips  · Upright at 90 degrees during meal  · oral care     COMPENSATORY STRATEGIES/MODIFICATIONS  · Verbal cues to swallow   · Fully awake/alert  · Check for pocketing      EDUCATION:  · Recommendations discussed with Nursing and patient  · Notified MD via perfectserve     CONTINUATION OF SKILLED SERVICES/MEDICAL NECESSITY:   Patient is expected to demonstrate progress in  swallow strength, swallow timeliness, swallow function and swallow safety in order to  improve swallow safety, work toward diet advancement and decrease aspiration risk.  Patient continues to require skilled intervention due to dysphagia. RECOMMENDATIONS for CONTINUED SPEECH THERAPY:   YES: Anticipate need for ongoing speech therapy during this hospitalization.        ASSESSMENT   Patient continues with prolonged oral holding with puree textures in isolation which ultimately required oral suctioning to remove. Only brief holding with thin liquids by straw. Single swallow per bolus with no overt s/sx of airway compromise. She was able to clear additional trials of puree textures from mouth with use of liquid wash (alternating 1/1 bites ad sips)    Recommend initiate clear liquid diet. 1:1 assistance with intake. Medications crushed in puree, followed by liquid wash to clear oral cavity. Will follow for diet tolerance and po trials for potential diet upgrade. COMPLIANCE WITH PROGRAM/EXERCISES: Will assess as treatment progresses  REHABILITATION POTENTIAL FOR STATED GOALS: Good    PLAN    FREQUENCY/DURATION: Continue to follow patient 3 times a week for duration of hospital stay to address above goals. - Recommendations for next treatment session: Next treatment will address po trials    SUBJECTIVE   Alert. Attempting to talk to clinician, but speech unintelligible,     Problem List:  (Impairments causing functional limitations):  1. Oropharyngeal dysphagia    Orientation:   Nodded \"yes\" to her name     Pain: Pain Scale 1: Adult Nonverbal Pain Scale  Pain Intensity 1: 0    OBJECTIVE   Patient seen for po trials. Trials initiated with puree textures. Greatly prolonged bolus holding with no apparent attempts to propel posteriorly. Max verbal and tactile cues did not improve swallow initiation. She ultimately required oral suctioning to clear bolus from anterior oral cavity. Thin liquids provided by straw sips. Intermittent, brief holding, but able to initiate swallow and oral clearing. Serial sips consumed without overt s/sx of airway compromise x7 trials. Returned to puree trials, but she continued to hold bolus anteriorly. Water by straw as liquid wash was effective in clearing puree from mouth. INTERDISCIPLINARY COLLABORATION: Registered Nurse  PRECAUTIONS/ALLERGIES: Patient has no known allergies.      Tool Used: Dysphagia Outcome and Severity Scale (GERALD) Score Comments   Normal Diet  [] 7 With no strategies or extra time needed   Functional Swallow  [] 6 May have mild oral or pharyngeal delay   Mild Dysphagia  [] 5 Which may require one diet consistency restricted    Mild-Moderate Dysphagia  [] 4 With 1-2 diet consistencies restricted   Moderate Dysphagia  [] 3 With 2 or more diet consistencies restricted   Moderate-Severe Dysphagia  [] 2 With partial PO strategies (trials with ST only)   Severe Dysphagia  [] 1 With inability to tolerate any PO safely      Score:  Initial: 1 Most Recent: 3 (Date 08/06/20 )   Interpretation of Tool: The Dysphagia Outcome and Severity Scale (GERALD) is a simple, easy-to-use, 7-point scale developed to systematically rate the functional severity of dysphagia based on objective assessment and make recommendations for diet level, independence level, and type of nutrition.      After treatment position/precautions:  · Upright in bed  · RN notified  · Call light within reach    Total Treatment Duration:   Time In: 5880  Time Out: [address], [address] [id number] [facility] ([place]

## 2020-08-06 NOTE — PROGRESS NOTES
Pt sitting up in bed. Pt alert but confused. Pt on RA with sat% at this time. Call light in reach, posey pad in reach, will monitor.

## 2020-08-06 NOTE — PROGRESS NOTES
Name: Rayshawn Montana MRN: [id number]  : 1935  Age:85 y.o.  female  Admit Date:  2020 LOS: 4      Hospitalist Progress Note    Rayshawn Montana is a 81 y.o. female with medical history significant for diabetes, hypertension who was admitted to ED on 2020 due to altered mental status. Was found to have UTI, acute kidney injury, hyponatremia on admission. Patient was noted to be desaturating to 88% on room air on 8/3. COVID rapid test is positive. Initially required 2L O2 NC but has been weaned. Subjective (20) :  Patient is seen and examined at bedside. No acute events reported overnight by nursing staff. Patient is more alert. Response to voice and follows simple commands. Last temp spike of 101. 1F was on  at 7:30am   Weaned down to 1L O2 NC with saturations > 95%. ROS:  Unable to assess due to clinical condition    Objective:    Patient Vitals for the past 24 hrs:   Temp Pulse Resp BP SpO2   20 1126 98.2 °F (36.8 °C) 72 20 145/75 96 %   20 0816 97.8 °F (36.6 °C) 69 20 160/75 96 %   20 0404 98.5 °F (36.9 °C) 68 19 122/80 97 %   20 0005 98 °F (36.7 °C) 67 18 155/79 95 %   20 97.3 °F (36.3 °C) 66 18 153/85 95 %   20 1525 98 °F (36.7 °C) 67 18 109/75 97 %     Oxygen Therapy  O2 Sat (%): 96 % (20 1126)  Pulse via Oximetry: 68 beats per minute (20 1601)  O2 Device: Nasal cannula (20)  O2 Flow Rate (L/min): 1 l/min (20)    Estimated body mass index is 28.94 kg/m² as calculated from the following:    Height as of this encounter: 5' 5\" (1.651 m). Weight as of this encounter: 78.9 kg (173 lb 14.4 oz).       Intake/Output Summary (Last 24 hours) at 2020 1222  Last data filed at 2020 1534  Gross per 24 hour   Intake 967 ml   Output 2200 ml   Net -1233 ml       *Note that automatically entered I/Os may not be accurate; dependent on patient compliance with collection and accurate data entry by techs. Physical Exam:   General:     awake, no acute distress. Not answering questions. Well nourished. Obese. Head:   normocephalic, atraumatic  Eyes, Ears, nose: PERRL. Normal conjunctiva  Neck:    supple, non-tender. Trachea midline. Lungs:   CTAB, no wheezing, rhonchi, rales  Cardiac:   RRR, Normal S1 and S2. Abdomen:   Soft, non distended, nontender, +BS  Extremities:   Warm, dry. No edema   Skin:   No rashes, no jaundice  Neuro:  Awake, opens eyes and tract. Data Review:  I have reviewed all labs, meds, and studies from the last 24 hours:    Recent Results (from the past 24 hour(s))   GLUCOSE, POC    Collection Time: 08/05/20  4:08 PM   Result Value Ref Range    Glucose (POC) 335 (H) 65 - 100 mg/dL   GLUCOSE, POC    Collection Time: 08/05/20  9:05 PM   Result Value Ref Range    Glucose (POC) 268 (H) 65 - 100 mg/dL   GLUCOSE, POC    Collection Time: 08/06/20  6:10 AM   Result Value Ref Range    Glucose (POC) 233 (H) 65 - 100 mg/dL   C REACTIVE PROTEIN, QT    Collection Time: 08/06/20  6:54 AM   Result Value Ref Range    C-Reactive protein 2.2 (H) 0.0 - 0.9 mg/dL   CBC WITH AUTOMATED DIFF    Collection Time: 08/06/20  6:54 AM   Result Value Ref Range    WBC 10.9 4.3 - 11.1 K/uL    RBC 4.20 4.05 - 5.2 M/uL    HGB 11.9 11.7 - 15.4 g/dL    HCT 34.9 (L) 35.8 - 46.3 %    MCV 83.1 79.6 - 97.8 FL    MCH 28.3 26.1 - 32.9 PG    MCHC 34.1 31.4 - 35.0 g/dL    RDW 12.3 11.9 - 14.6 %    PLATELET 666 328 - 864 K/uL    MPV 13.5 (H) 9.4 - 12.3 FL    ABSOLUTE NRBC 0.00 0.0 - 0.2 K/uL    DF AUTOMATED      NEUTROPHILS 79 (H) 43 - 78 %    LYMPHOCYTES 13 13 - 44 %    MONOCYTES 6 4.0 - 12.0 %    EOSINOPHILS 0 (L) 0.5 - 7.8 %    BASOPHILS 0 0.0 - 2.0 %    IMMATURE GRANULOCYTES 2 0.0 - 5.0 %    ABS. NEUTROPHILS 8.6 (H) 1.7 - 8.2 K/UL    ABS. LYMPHOCYTES 1.4 0.5 - 4.6 K/UL    ABS. MONOCYTES 0.7 0.1 - 1.3 K/UL    ABS. EOSINOPHILS 0.0 0.0 - 0.8 K/UL    ABS. BASOPHILS 0.0 0.0 - 0.2 K/UL    ABS. IMM.  GRANS. 0.2 0.0 - 0.5 K/UL   METABOLIC PANEL, BASIC    Collection Time: 08/06/20  6:54 AM   Result Value Ref Range    Sodium 141 136 - 145 mmol/L    Potassium 3.1 (L) 3.5 - 5.1 mmol/L    Chloride 109 (H) 98 - 107 mmol/L    CO2 24 21 - 32 mmol/L    Anion gap 8 7 - 16 mmol/L    Glucose 228 (H) 65 - 100 mg/dL    BUN 15 8 - 23 MG/DL    Creatinine 0.82 0.6 - 1.0 MG/DL    GFR est AA >60 >60 ml/min/1.73m2    GFR est non-AA >60 >60 ml/min/1.73m2    Calcium 9.5 8.3 - 10.4 MG/DL   GLUCOSE, POC    Collection Time: 08/06/20 10:58 AM   Result Value Ref Range    Glucose (POC) 221 (H) 65 - 100 mg/dL        All Micro Results     Procedure Component Value Units Date/Time    CULTURE, BLOOD [[id number]] Collected:  08/02/20 1430    Order Status:  Completed Specimen:  Blood Updated:  08/06/20 0926     Special Requests: --        RIGHT  FOREARM       Culture result: NO GROWTH 4 DAYS       CULTURE, BLOOD [[id number]] Collected:  08/02/20 1427    Order Status:  Completed Specimen:  Blood Updated:  08/06/20 0926     Special Requests: --        LEFT  Antecubital       Culture result: NO GROWTH 4 DAYS       CULTURE, URINE [[id number]] Collected:  08/02/20 1427    Order Status:  Completed Specimen:  Cath Urine Updated:  08/05/20 3255     Special Requests: NO SPECIAL REQUESTS        Culture result:       >100,000 COLONIES/mL MIXED SKIN ABDIRASHID ISOLATED          CULTURE, URINE [[id number]]     Order Status:  Canceled Specimen:  Cath Urine           Current Meds:  Current Facility-Administered Medications   Medication Dose Route Frequency    potassium chloride 20 mEq in 100 ml IVPB  20 mEq IntraVENous Q2H    0.45% sodium chloride infusion  50 mL/hr IntraVENous CONTINUOUS    lip protectant (BLISTEX) ointment 1 Each  1 Each Topical PRN    famotidine (PEPCID) tablet 20 mg  20 mg Oral DAILY PRN    ascorbic acid (vitamin C) (VITAMIN C) tablet 500 mg  500 mg Oral TID    zinc sulfate tablet 220 mg  220 mg Oral DAILY    dexamethasone (DECADRON) 10 mg/mL injection 6 mg 6 mg IntraVENous DAILY    [Held by provider] insulin glargine (LANTUS) injection 20 Units  20 Units SubCUTAneous QHS    [Held by provider] rosuvastatin (CRESTOR) tablet 20 mg  20 mg Oral QHS    insulin lispro (HUMALOG) injection   SubCUTAneous AC&HS    dextrose 40% (GLUTOSE) oral gel 1 Tube  15 g Oral PRN    glucagon (GLUCAGEN) injection 1 mg  1 mg IntraMUSCular PRN    dextrose (D50W) injection syrg 12.5-25 g  25-50 mL IntraVENous PRN    sodium chloride (NS) flush 5-40 mL  5-40 mL IntraVENous Q8H    sodium chloride (NS) flush 5-40 mL  5-40 mL IntraVENous PRN    acetaminophen (TYLENOL) tablet 650 mg  650 mg Oral Q6H PRN    Or    acetaminophen (TYLENOL) suppository 650 mg  650 mg Rectal Q6H PRN    polyethylene glycol (MIRALAX) packet 17 g  17 g Oral DAILY PRN    bisacodyL (DULCOLAX) suppository 10 mg  10 mg Rectal DAILY PRN    ondansetron (ZOFRAN ODT) tablet 4 mg  4 mg Oral Q8H PRN    Or    ondansetron (ZOFRAN) injection 4 mg  4 mg IntraVENous Q6H PRN    potassium chloride (KLOR-CON) tablet 40 mEq  40 mEq Oral PRN    Or    potassium bicarb-citric acid (EFFER-K) tablet 40 mEq  40 mEq Oral PRN    Or    potassium chloride 10 mEq in 100 ml IVPB  10 mEq IntraVENous PRN    potassium chloride 10 mEq in 100 ml IVPB  10 mEq IntraVENous PRN    magnesium sulfate 2 g/50 ml IVPB (premix or compounded)  2 g IntraVENous PRN    heparin (porcine) injection 5,000 Units  5,000 Units SubCUTAneous Q8H    guaiFENesin ER (MUCINEX) tablet 1,200 mg  1,200 mg Oral BID PRN    hydrALAZINE (APRESOLINE) 20 mg/mL injection 20 mg  20 mg IntraVENous Q4H PRN    alum-mag hydroxide-simeth (MYLANTA) oral suspension 30 mL  30 mL Oral Q4H PRN    oxyCODONE IR (ROXICODONE) tablet 5 mg  5 mg Oral Q4H PRN    oxyCODONE IR (ROXICODONE) tablet 10 mg  10 mg Oral Q4H PRN    guaiFENesin-dextromethorphan (ROBITUSSIN DM) 100-10 mg/5 mL syrup 10 mL  10 mL Oral Q4H PRN    senna-docusate (PERICOLACE) 8.6-50 mg per tablet 2 Tab  2 Tab Oral DAILY PRN    hydrOXYzine pamoate (VISTARIL) capsule 50 mg  50 mg Oral QID PRN    LORazepam (ATIVAN) tablet 0.5 mg  0.5 mg Oral Q4H PRN    mirtazapine (REMERON) tablet 15 mg  15 mg Oral QHS PRN    cefTRIAXone (ROCEPHIN) 1 g in 0.9% sodium chloride (MBP/ADV) 50 mL  1 g IntraVENous Q24H         Other Studies:  Ct Head Wo Cont    Result Date: 8/2/2020  Exam: CT head without contrast. Indication: Altered mental status. Comparison: None. Multiple axial images obtained through the brain without intravenous contrast. Radiation dose reduction techniques were used for this study: All CT scans performed at this facility use one or all of the following: Automated exposure control, adjustment of the mA and/or kVp according to patient's size, iterative reconstruction. FINDINGS: Prominent chronic mineralization of the dentate nuclei of the cerebellum and of the basal ganglia. Diffuse cerebral atrophy with commensurate ex vacuo dilatation of the ventricles. Scattered periventricular and centrum semiovale white matter hypointensities most indicative of chronic ischemic white matter change. No evidence of intracranial mass, hemorrhage, or large territorial infarct. The basal cisterns are patent. No extra-axial fluid collection or mass effect. Left lens replacement. The paranasal sinuses are clear. The mastoid air cells and middle ears are clear. No significant osseous or extracranial soft tissue lesions. IMPRESSION: 1. No evidence of acute intracranial abnormality. 2. Prominent chronic mineralization of the basal ganglia and dentate nuclei likely senescent change however recommend correlation for possible hyperparathyroidism or pseudohyperparathyroidism. Xr Chest Port    Result Date: 8/2/2020  EXAM: Single view chest radiograph. INDICATION: Altered mental status. COMPARISON: None. FINDINGS: Hypoventilatory exam with bronchovascular crowding and bibasilar atelectasis. Cardiomegaly evident. Sternal wires appear intact. Mediastinal surgical clips evident. Atherosclerotic aortic arch. Advanced right shoulder joint degenerative changes. IMPRESSION: 1. Hypoventilatory exam with bronchovascular crowding and bibasilar atelectasis. 2. Cardiomegaly status post median sternotomy. Assessment:    Active Hospital Problems    Diagnosis Date Noted    Sepsis ([facility] [address] 08/04/2020    Hypernatremia 08/02/2020    UTI (urinary tract infection) 08/02/2020    KAILEY (acute kidney injury) ([facility] [address] 08/02/2020    Acute metabolic encephalopathy 53/76/4986    Alzheimer's dementia with behavioral disturbance ([facility] [address] 08/02/2020    DNR (do not resuscitate) 08/02/2020    Diabetes mellitus, type II ([facility] [address]     Benign hypertension        Plan:    Acute metabolic encephalopathy likely multifactorial (UTI, Hypernatremia, hypoxia)  - treat underlying condition as below  - NPO for now  - SLP following    Acute respiratory failure with Hypoxia (from UTI and COVID 19 infection, ?aspiration)  Sepsis(present on admission) due to UTI +/- COVID-19 Infection  COVID-19 Infection  UTI  Meets criteria with tachycardia and hypoxia on 8/3 (88% on room air with HR > 100)  Rapid COVID-19 test is positive on 8/3. BCx and UCx has been neg to date. - dexamethasone 6mg daily  - zinc and vitamin C supplement   - O2 supplement as needed and wean as tolerated  - continue with rocephin for now    Hypernatremia (resolved)   Hypertension: hold home medications for now    KAILEY (improving)  - continue with ivf    T2DM  - hold diabetic medications for now given NPO    Alzheimer's dementia with behavioral disturbance    Spoke with Letty (Daughter) and updated. Diet:  DIET NPO  DVT PPx: heparin sq  Code: DNR  Dispo: SNF  Estimated Discharge: TBD based on clinical course    Labs/Imaging Reviewed. Patient is high risk due to current condition and comorbid conditions as well as requiring frequent monitoring. Plan discussed with staff, patient/family and are in agreement.      Time Spent: Greater than 45 minutes was spent in reviewing charts, physical exam, discussion with patient, and answered any questions.     Signed By: Dion Andres MD     August 6, 2020

## 2020-08-07 VITALS
HEART RATE: 60 BPM | DIASTOLIC BLOOD PRESSURE: 71 MMHG | SYSTOLIC BLOOD PRESSURE: 153 MMHG | BODY MASS INDEX: 28.97 KG/M2 | OXYGEN SATURATION: 93 % | HEIGHT: 65 IN | TEMPERATURE: 97.9 F | RESPIRATION RATE: 18 BRPM | WEIGHT: 173.9 LBS

## 2020-08-07 LAB
ANION GAP SERPL CALC-SCNC: 11 MMOL/L (ref 7–16)
BACTERIA SPEC CULT: NORMAL
BACTERIA SPEC CULT: NORMAL
BASOPHILS # BLD: 0.1 K/UL (ref 0–0.2)
BASOPHILS NFR BLD: 1 % (ref 0–2)
BUN SERPL-MCNC: 17 MG/DL (ref 8–23)
CALCIUM SERPL-MCNC: 9.7 MG/DL (ref 8.3–10.4)
CHLORIDE SERPL-SCNC: 111 MMOL/L (ref 98–107)
CO2 SERPL-SCNC: 21 MMOL/L (ref 21–32)
COVID-19 RAPID TEST, COVR: DETECTED
CREAT SERPL-MCNC: 0.79 MG/DL (ref 0.6–1)
CRP SERPL-MCNC: 1.2 MG/DL (ref 0–0.9)
DIFFERENTIAL METHOD BLD: ABNORMAL
EOSINOPHIL # BLD: 0 K/UL (ref 0–0.8)
EOSINOPHIL NFR BLD: 0 % (ref 0.5–7.8)
ERYTHROCYTE [DISTWIDTH] IN BLOOD BY AUTOMATED COUNT: 12.4 % (ref 11.9–14.6)
GLUCOSE BLD STRIP.AUTO-MCNC: 215 MG/DL (ref 65–100)
GLUCOSE BLD STRIP.AUTO-MCNC: 256 MG/DL (ref 65–100)
GLUCOSE SERPL-MCNC: 216 MG/DL (ref 65–100)
HCT VFR BLD AUTO: 38.7 % (ref 35.8–46.3)
HGB BLD-MCNC: 12.6 G/DL (ref 11.7–15.4)
IMM GRANULOCYTES # BLD AUTO: 0.1 K/UL (ref 0–0.5)
IMM GRANULOCYTES NFR BLD AUTO: 1 % (ref 0–5)
LYMPHOCYTES # BLD: 1.5 K/UL (ref 0.5–4.6)
LYMPHOCYTES NFR BLD: 14 % (ref 13–44)
MCH RBC QN AUTO: 27.7 PG (ref 26.1–32.9)
MCHC RBC AUTO-ENTMCNC: 32.6 G/DL (ref 31.4–35)
MCV RBC AUTO: 85.1 FL (ref 79.6–97.8)
MONOCYTES # BLD: 0.7 K/UL (ref 0.1–1.3)
MONOCYTES NFR BLD: 7 % (ref 4–12)
NEUTS SEG # BLD: 8 K/UL (ref 1.7–8.2)
NEUTS SEG NFR BLD: 77 % (ref 43–78)
NRBC # BLD: 0 K/UL (ref 0–0.2)
PLATELET # BLD AUTO: 194 K/UL (ref 150–450)
PMV BLD AUTO: 13.2 FL (ref 9.4–12.3)
POTASSIUM SERPL-SCNC: 3.6 MMOL/L (ref 3.5–5.1)
RBC # BLD AUTO: 4.55 M/UL (ref 4.05–5.2)
SERVICE CMNT-IMP: NORMAL
SERVICE CMNT-IMP: NORMAL
SODIUM SERPL-SCNC: 143 MMOL/L (ref 136–145)
SOURCE, COVRS: ABNORMAL
WBC # BLD AUTO: 10.4 K/UL (ref 4.3–11.1)

## 2020-08-07 PROCEDURE — 80048 BASIC METABOLIC PNL TOTAL CA: CPT

## 2020-08-07 PROCEDURE — 92526 ORAL FUNCTION THERAPY: CPT

## 2020-08-07 PROCEDURE — 74011000258 HC RX REV CODE- 258: Performed by: INTERNAL MEDICINE

## 2020-08-07 PROCEDURE — 74011250637 HC RX REV CODE- 250/637: Performed by: INTERNAL MEDICINE

## 2020-08-07 PROCEDURE — 74011250636 HC RX REV CODE- 250/636: Performed by: INTERNAL MEDICINE

## 2020-08-07 PROCEDURE — 36415 COLL VENOUS BLD VENIPUNCTURE: CPT

## 2020-08-07 PROCEDURE — 85025 COMPLETE CBC W/AUTO DIFF WBC: CPT

## 2020-08-07 PROCEDURE — 87635 SARS-COV-2 COVID-19 AMP PRB: CPT

## 2020-08-07 PROCEDURE — 82962 GLUCOSE BLOOD TEST: CPT

## 2020-08-07 PROCEDURE — 74011636637 HC RX REV CODE- 636/637: Performed by: INTERNAL MEDICINE

## 2020-08-07 PROCEDURE — 86140 C-REACTIVE PROTEIN: CPT

## 2020-08-07 RX ORDER — ENOXAPARIN SODIUM 100 MG/ML
30 INJECTION SUBCUTANEOUS EVERY 12 HOURS
Status: DISCONTINUED | OUTPATIENT
Start: 2020-08-07 | End: 2020-08-07 | Stop reason: HOSPADM

## 2020-08-07 RX ORDER — VALSARTAN 40 MG/1
40 TABLET ORAL DAILY
Qty: 30 TAB | Refills: 1 | Status: SHIPPED | OUTPATIENT
Start: 2020-08-07

## 2020-08-07 RX ORDER — CEFUROXIME AXETIL 500 MG/1
500 TABLET ORAL 2 TIMES DAILY
Qty: 8 TAB | Refills: 0 | Status: SHIPPED | OUTPATIENT
Start: 2020-08-07 | End: 2020-08-10

## 2020-08-07 RX ORDER — DEXAMETHASONE 2 MG/1
TABLET ORAL
Qty: 6 TAB | Refills: 0 | Status: SHIPPED | OUTPATIENT
Start: 2020-08-07 | End: 2020-08-10

## 2020-08-07 RX ORDER — VALSARTAN 40 MG/1
40 TABLET ORAL DAILY
Qty: 30 TAB | Refills: 1 | Status: SHIPPED | OUTPATIENT
Start: 2020-08-07 | End: 2020-08-07 | Stop reason: SDUPTHER

## 2020-08-07 RX ORDER — FAMOTIDINE 20 MG/1
20 TABLET, FILM COATED ORAL 2 TIMES DAILY
Qty: 14 TAB | Refills: 0 | Status: SHIPPED | OUTPATIENT
Start: 2020-08-07

## 2020-08-07 RX ADMIN — INSULIN LISPRO 6 UNITS: 100 INJECTION, SOLUTION INTRAVENOUS; SUBCUTANEOUS at 11:49

## 2020-08-07 RX ADMIN — DEXAMETHASONE SODIUM PHOSPHATE 6 MG: 10 INJECTION INTRAMUSCULAR; INTRAVENOUS at 08:54

## 2020-08-07 RX ADMIN — INSULIN LISPRO 4 UNITS: 100 INJECTION, SOLUTION INTRAVENOUS; SUBCUTANEOUS at 05:54

## 2020-08-07 RX ADMIN — ENOXAPARIN SODIUM 30 MG: 30 INJECTION SUBCUTANEOUS at 11:50

## 2020-08-07 RX ADMIN — Medication 500 MG: at 08:54

## 2020-08-07 RX ADMIN — HEPARIN SODIUM 5000 UNITS: 5000 INJECTION INTRAVENOUS; SUBCUTANEOUS at 05:55

## 2020-08-07 RX ADMIN — Medication 220 MG: at 08:54

## 2020-08-07 RX ADMIN — Medication 10 ML: at 05:55

## 2020-08-07 RX ADMIN — SODIUM CHLORIDE 50 ML/HR: 450 INJECTION, SOLUTION INTRAVENOUS at 05:55

## 2020-08-07 NOTE — PROGRESS NOTES
Problem: Dysphagia (Adult)  Goal: *Acute Goals and Plan of Care (Insert Text)  Outcome: Progressing Towards Goal  Note: LTG: Patient will tolerate least restrictive diet without overt signs or symptoms of airway compromise. STG: Patient will participate in po trials with ST in efforts to identify safest, least restrictive oral diet MET 8/6/20  STG: Patient will tolerate clear liquid diet without overt s/sx of airway compromise. ADDED 8/6/20 MET 8/7/20  STG: Patient will tolerate puree diet/thin liquids without overt s/sx of airway compromise. ADDED 8/7/20  STG: Patient will participate in modified barium swallow study as clinically indicated. SPEECH LANGUAGE PATHOLOGY: DYSPHAGIA- Daily Note 4  NAME/AGE/GENDER: Johnathan Galdamez is a 81 y.o. female  DATE: 8/7/2020  PRIMARY DIAGNOSIS: Acute metabolic encephalopathy [V16.46]  UTI (urinary tract infection) [N39.0]  KAILEY (acute kidney injury) ([facility] [address] [N17.9]  Hypernatremia [E87.0]      ICD-10: Treatment Diagnosis: R13.12 Dysphagia, Oropharyngeal Phase    RECOMMENDATIONS   DIET:    PO:  Pureed   Liquids:  regular thin       MEDICATIONS: Crushed in puree; follow with liquid wash     ASPIRATION PRECAUTIONS  · Slow rate of intake  · Small bites/sips  · Upright at 90 degrees during meal  · oral care     COMPENSATORY STRATEGIES/MODIFICATIONS  · Verbal cues to swallow   · Fully awake/alert  · Check for pocketing   · Alternate One bite/One Sip     EDUCATION:  · Recommendations discussed with Nursing and patient  · Notified MD via perfectserve     CONTINUATION OF SKILLED SERVICES/MEDICAL NECESSITY:   Patient is expected to demonstrate progress in  swallow strength, swallow timeliness, swallow function and swallow safety in order to  improve swallow safety, work toward diet advancement and decrease aspiration risk.  Patient continues to require skilled intervention due to dysphagia.       RECOMMENDATIONS for CONTINUED SPEECH THERAPY:   YES: Anticipate need for ongoing speech therapy during this hospitalization. ASSESSMENT   Patient with improved oral clearing with thin and puree textures this AM. She continues to require liquid wash after each puree bite, but is able to clear oral cavity appropriately without cues. Timely swallow with all liquids without s/sx of airway compromise. Recommend upgrade to puree diet/thin liquids. 1:1 assist with intake. Alternate 1 bite/1 sip due to oral holding with puree. Medications crushed in puree with liquid wash. Will continue to follow. COMPLIANCE WITH PROGRAM/EXERCISES: Will assess as treatment progresses  REHABILITATION POTENTIAL FOR STATED GOALS: Good    PLAN    FREQUENCY/DURATION: Continue to follow patient 3 times a week for duration of hospital stay to address above goals. - Recommendations for next treatment session: Next treatment will address po trials    SUBJECTIVE   More appropriate today. No verbalizations, but did nod to respond to yes/no questions. Problem List:  (Impairments causing functional limitations):  1. Oropharyngeal dysphagia    Orientation:   Nodded to name. Pain: Pain Scale 1: Adult Nonverbal Pain Scale  Pain Intensity 1: 0    OBJECTIVE   Patient seen for ongoing po trials. AM meal tray at bedside with 100% of jello consumed. She was agreeable to thin liquids trials by straw. Timely swallow palpated without overt coughing or throat clearing. Appropriate acceptance of puree textures. She continued to hold bolus orally despite verbal cues. However, when liquid wash was provided, she exhibited timely swallow and appropriate oral clearing. Session ultimately discontinued due to patient shaking head no when provided with additional bites. INTERDISCIPLINARY COLLABORATION: Registered Nurse  PRECAUTIONS/ALLERGIES: Patient has no known allergies.      Tool Used: Dysphagia Outcome and Severity Scale (GERALD)    Score Comments   Normal Diet  [] 7 With no strategies or extra time needed   Functional Swallow  [] 6 May have mild oral or pharyngeal delay   Mild Dysphagia  [] 5 Which may require one diet consistency restricted    Mild-Moderate Dysphagia  [] 4 With 1-2 diet consistencies restricted   Moderate Dysphagia  [] 3 With 2 or more diet consistencies restricted   Moderate-Severe Dysphagia  [] 2 With partial PO strategies (trials with ST only)   Severe Dysphagia  [] 1 With inability to tolerate any PO safely      Score:  Initial: 1 Most Recent: 3 (Date 08/07/20 )   Interpretation of Tool: The Dysphagia Outcome and Severity Scale (GERALD) is a simple, easy-to-use, 7-point scale developed to systematically rate the functional severity of dysphagia based on objective assessment and make recommendations for diet level, independence level, and type of nutrition.      After treatment position/precautions:  · Upright in bed  · RN and MD notified  · Call light within reach    Total Treatment Duration:   Time In: 4112  Time Out: [place], [address] [address] [facility], [place]

## 2020-08-07 NOTE — DISCHARGE SUMMARY
Hospitalist Discharge Summary     Admit Date:  2020  2:03 PM   Name:  Maricel Rebollar   Age:  81 y.o.  :  1935   MRN:  [id number]   PCP:  Brenda Hill MD  Treatment Team: Attending Provider: Loney Cushing, MD; Utilization Review: Ollie Jensen RN; Care Manager: Ben Madison Primary Nurse: Simon Quinn; Physical Therapist: Rosey Byrd PT; Occupational Therapist: Karina Castro OT    Problem List for this Hospitalization:  C/Jesse Hanson 1106 Problems    Diagnosis Date Noted    Sepsis [facility]) 2020    Hypernatremia 2020    UTI (urinary tract infection) 2020    [facility]AILEY [facility]acute kidney injury) ([facility] 2020    Acute metabolic encephalopathy     Alzheimer's dementia with behavioral disturbance ([facility] 2020    DNR (do not resuscitate) 2020    Diabetes mellitus, type II ([facility]     Benign hypertension          Hospital Course:  Please refer to the admission H&P for details of presentation. In summary, Maricel Rebollar is a 81 y.o. female with past medical history significant for diabetes, hypertension who was admitted to ED on 2020 due to altered mental status. She was found to have UTI, acute kidney injury, hyponatremia on admission. Patient was noted to be desaturating to 88% on room air on 8/3. COVID rapid test was positive. She initially required 2L O2 NC but has been weaned to room air. Her mentation has been improving and following simple commands. SLP evaluated her and she is now cleared for Pureed diet with regular thin liquids. Patient is medically stable for discharge and will need continue her antibiotics treatment for UTI. Her Lantus dose has been reduced to 10Units daily and diovan dose to 40mg daily. Disposition:  [facility]  Activity: Activity as tolerated  Diet: DIET PUREED  Code Status: DNR      Follow up instructions, discharge meds at bottom of this note. Plan was discussed with patient/family.   All questions answered. Patient was stable at time of discharge. Patient will call a physician or return if any concerns. Diagnostic Imaging/Tests:   Ct Head Wo Cont    Result Date: 8/2/2020  Exam: CT head without contrast. Indication: Altered mental status. Comparison: None. Multiple axial images obtained through the brain without intravenous contrast. Radiation dose reduction techniques were used for this study: All CT scans performed at this facility use one or all of the following: Automated exposure control, adjustment of the mA and/or kVp according to patient's size, iterative reconstruction. FINDINGS: Prominent chronic mineralization of the dentate nuclei of the cerebellum and of the basal ganglia. Diffuse cerebral atrophy with commensurate ex vacuo dilatation of the ventricles. Scattered periventricular and centrum semiovale white matter hypointensities most indicative of chronic ischemic white matter change. No evidence of intracranial mass, hemorrhage, or large territorial infarct. The basal cisterns are patent. No extra-axial fluid collection or mass effect. Left lens replacement. The paranasal sinuses are clear. The mastoid air cells and middle ears are clear. No significant osseous or extracranial soft tissue lesions. IMPRESSION: 1. No evidence of acute intracranial abnormality. 2. Prominent chronic mineralization of the basal ganglia and dentate nuclei likely senescent change however recommend correlation for possible hyperparathyroidism or pseudohyperparathyroidism. Xr Chest Port    Result Date: 8/2/2020  EXAM: Single view chest radiograph. INDICATION: Altered mental status. COMPARISON: None. FINDINGS: Hypoventilatory exam with bronchovascular crowding and bibasilar atelectasis. Cardiomegaly evident. Sternal wires appear intact. Mediastinal surgical clips evident. Atherosclerotic aortic arch. Advanced right shoulder joint degenerative changes. IMPRESSION: 1.  Hypoventilatory exam with bronchovascular crowding and bibasilar atelectasis. 2. Cardiomegaly status post median sternotomy. Echocardiogram results:  No results found for this visit on 08/02/20. Procedures done this admission:  * No surgery found *    All Micro Results     Procedure Component Value Units Date/Time    CULTURE, BLOOD [[id number]] Collected:  08/02/20 1430    Order Status:  Completed Specimen:  Blood Updated:  08/07/20 0756     Special Requests: --        RIGHT  FOREARM       Culture result: NO GROWTH 5 DAYS       CULTURE, BLOOD [[id number]] Collected:  08/02/20 1427    Order Status:  Completed Specimen:  Blood Updated:  08/07/20 0756     Special Requests: --        LEFT  Antecubital       Culture result: NO GROWTH 5 DAYS       CULTURE, URINE [[id number]] Collected:  08/02/20 1427    Order Status:  Completed Specimen:  Cath Urine Updated:  08/05/20 0637     Special Requests: NO SPECIAL REQUESTS        Culture result:       >100,000 COLONIES/mL MIXED SKIN ABDIRASHID ISOLATED          CULTURE, URINE [[id number]]     Order Status:  Canceled Specimen:  Cath Urine           Labs: Results:       BMP, Mg, Phos Recent Labs     08/07/20  0643 08/06/20  0654 08/05/20  0506    141 140   K 3.6 3.1* 3.8   * 109* 109*   CO2 21 24 24   AGAP 11 8 7   BUN 17 15 18   CREA 0.79 0.82 1.12*   CA 9.7 9.5 9.1   * 228* 310*   MG  --  1.9  --       CBC Recent Labs     08/07/20  0643 08/06/20  0654 08/05/20  0506   WBC 10.4 10.9 5.6   RBC 4.55 4.20 4.11   HGB 12.6 11.9 11.7   HCT 38.7 34.9* 35.4*    196 174   GRANS 77 79* 76   LYMPH 14 13 20   EOS 0* 0* 0*   MONOS 7 6 3*   BASOS 1 0 0   IG 1 2 1   ANEU 8.0 8.6* 4.2   ABL 1.5 1.4 1.1   ELVA 0.0 0.0 0.0   ABM 0.7 0.7 0.2   ABB 0.1 0.0 0.0   AIG 0.1 0.2 0.1      LFT No results for input(s): ALT, TBIL, AP, TP, ALB, GLOB, AGRAT in the last 72 hours.     No lab exists for component: SGOT, GPT   Cardiac Testing No results found for: BNPP, BNP, CPK, RCK1, RCK2, RCK3, RCK4, CKMB, CKNDX, CKND1, TROPT, TROIQ   Coagulation Tests No results found for: PTP, INR, APTT, INREXT, INREXT   A1c No results found for: HBA1C, HGBE8, MKI6EPXQ, ZIK3KWWS   Lipid Panel Lab Results   Component Value Date/Time    Cholesterol, total 131 05/26/2017 10:23 AM    HDL Cholesterol 55 05/26/2017 10:23 AM    LDL, calculated 61 05/26/2017 10:23 AM    VLDL, calculated 15 05/26/2017 10:23 AM    Triglyceride 74 05/26/2017 10:23 AM      Thyroid Panel Lab Results   Component Value Date/Time    TSH 1.250 05/26/2017 10:23 AM        Most Recent UA Lab Results   Component Value Date/Time    Color YELLOW 08/02/2020 02:27 PM    Appearance TURBID 08/02/2020 02:27 PM    Specific gravity 1.013 08/02/2020 02:27 PM    pH (UA) 8.0 08/02/2020 02:27 PM    Protein 100 (A) 08/02/2020 02:27 PM    Glucose Negative 08/02/2020 02:27 PM    Ketone Negative 08/02/2020 02:27 PM    Bilirubin Negative 08/02/2020 02:27 PM    Blood LARGE (A) 08/02/2020 02:27 PM    Urobilinogen 1.0 08/02/2020 02:27 PM    Nitrites Negative 08/02/2020 02:27 PM    Leukocyte Esterase LARGE (A) 08/02/2020 02:27 PM    WBC >100 08/02/2020 02:27 PM    RBC 20-50 08/02/2020 02:27 PM    Epithelial cells 0-3 08/02/2020 02:27 PM    Bacteria 4+ (H) 08/02/2020 02:27 PM    Other observations RESULTS VERIFIED MANUALLY 08/02/2020 02:27 PM        No Known Allergies  Immunization History   Administered Date(s) Administered    Influenza High Dose Vaccine PF 10/13/2015, 11/21/2016    Influenza Vaccine (Trivalent) 01/04/2015, 01/23/2016    Pneumococcal Polysaccharide (PPSV-23) 11/01/2004, 10/04/2013, 01/04/2015       All Labs from Last 24 Hrs:  Recent Results (from the past 24 hour(s))   GLUCOSE, POC    Collection Time: 08/06/20  4:25 PM   Result Value Ref Range    Glucose (POC) 236 (H) 65 - 100 mg/dL   GLUCOSE, POC    Collection Time: 08/06/20  8:45 PM   Result Value Ref Range    Glucose (POC) 273 (H) 65 - 100 mg/dL   GLUCOSE, POC    Collection Time: 08/07/20  5:23 AM   Result Value Ref Range Glucose (POC) 215 (H) 65 - 100 mg/dL   C REACTIVE PROTEIN, QT    Collection Time: 08/07/20  6:43 AM   Result Value Ref Range    C-Reactive protein 1.2 (H) 0.0 - 0.9 mg/dL   CBC WITH AUTOMATED DIFF    Collection Time: 08/07/20  6:43 AM   Result Value Ref Range    WBC 10.4 4.3 - 11.1 K/uL    RBC 4.55 4.05 - 5.2 M/uL    HGB 12.6 11.7 - 15.4 g/dL    HCT 38.7 35.8 - 46.3 %    MCV 85.1 79.6 - 97.8 FL    MCH 27.7 26.1 - 32.9 PG    MCHC 32.6 31.4 - 35.0 g/dL    RDW 12.4 11.9 - 14.6 %    PLATELET 907 442 - 085 K/uL    MPV 13.2 (H) 9.4 - 12.3 FL    ABSOLUTE NRBC 0.00 0.0 - 0.2 K/uL    NEUTROPHILS 77 43 - 78 %    LYMPHOCYTES 14 13 - 44 %    MONOCYTES 7 4.0 - 12.0 %    EOSINOPHILS 0 (L) 0.5 - 7.8 %    BASOPHILS 1 0.0 - 2.0 %    IMMATURE GRANULOCYTES 1 0.0 - 5.0 %    ABS. NEUTROPHILS 8.0 1.7 - 8.2 K/UL    ABS. LYMPHOCYTES 1.5 0.5 - 4.6 K/UL    ABS. MONOCYTES 0.7 0.1 - 1.3 K/UL    ABS. EOSINOPHILS 0.0 0.0 - 0.8 K/UL    ABS. BASOPHILS 0.1 0.0 - 0.2 K/UL    ABS. IMM.  GRANS. 0.1 0.0 - 0.5 K/UL    DF AUTOMATED     METABOLIC PANEL, BASIC    Collection Time: 08/07/20  6:43 AM   Result Value Ref Range    Sodium 143 136 - 145 mmol/L    Potassium 3.6 3.5 - 5.1 mmol/L    Chloride 111 (H) 98 - 107 mmol/L    CO2 21 21 - 32 mmol/L    Anion gap 11 7 - 16 mmol/L    Glucose 216 (H) 65 - 100 mg/dL    BUN 17 8 - 23 MG/DL    Creatinine 0.79 0.6 - 1.0 MG/DL    GFR est AA >60 >60 ml/min/1.73m2    GFR est non-AA >60 >60 ml/min/1.73m2    Calcium 9.7 8.3 - 10.4 MG/DL   GLUCOSE, POC    Collection Time: 08/07/20 10:59 AM   Result Value Ref Range    Glucose (POC) 256 (H) 65 - 100 mg/dL       Current Med List in Hospital:   Current Facility-Administered Medications   Medication Dose Route Frequency    enoxaparin (LOVENOX) injection 30 mg  30 mg SubCUTAneous Q12H    0.45% sodium chloride infusion  50 mL/hr IntraVENous CONTINUOUS    lip protectant (BLISTEX) ointment 1 Each  1 Each Topical PRN    famotidine (PEPCID) tablet 20 mg  20 mg Oral DAILY PRN    ascorbic acid (vitamin C) (VITAMIN C) tablet 500 mg  500 mg Oral TID    zinc sulfate tablet 220 mg  220 mg Oral DAILY    dexamethasone (DECADRON) 10 mg/mL injection 6 mg  6 mg IntraVENous DAILY    [Held by provider] insulin glargine (LANTUS) injection 20 Units  20 Units SubCUTAneous QHS    [Held by provider] rosuvastatin (CRESTOR) tablet 20 mg  20 mg Oral QHS    insulin lispro (HUMALOG) injection   SubCUTAneous AC&HS    dextrose 40% (GLUTOSE) oral gel 1 Tube  15 g Oral PRN    glucagon (GLUCAGEN) injection 1 mg  1 mg IntraMUSCular PRN    dextrose (D50W) injection syrg 12.5-25 g  25-50 mL IntraVENous PRN    sodium chloride (NS) flush 5-40 mL  5-40 mL IntraVENous Q8H    sodium chloride (NS) flush 5-40 mL  5-40 mL IntraVENous PRN    acetaminophen (TYLENOL) tablet 650 mg  650 mg Oral Q6H PRN    Or    acetaminophen (TYLENOL) suppository 650 mg  650 mg Rectal Q6H PRN    polyethylene glycol (MIRALAX) packet 17 g  17 g Oral DAILY PRN    bisacodyL (DULCOLAX) suppository 10 mg  10 mg Rectal DAILY PRN    ondansetron (ZOFRAN ODT) tablet 4 mg  4 mg Oral Q8H PRN    Or    ondansetron (ZOFRAN) injection 4 mg  4 mg IntraVENous Q6H PRN    potassium chloride (KLOR-CON) tablet 40 mEq  40 mEq Oral PRN    Or    potassium bicarb-citric acid (EFFER-K) tablet 40 mEq  40 mEq Oral PRN    Or    potassium chloride 10 mEq in 100 ml IVPB  10 mEq IntraVENous PRN    potassium chloride 10 mEq in 100 ml IVPB  10 mEq IntraVENous PRN    magnesium sulfate 2 g/50 ml IVPB (premix or compounded)  2 g IntraVENous PRN    guaiFENesin ER (MUCINEX) tablet 1,200 mg  1,200 mg Oral BID PRN    hydrALAZINE (APRESOLINE) 20 mg/mL injection 20 mg  20 mg IntraVENous Q4H PRN    alum-mag hydroxide-simeth (MYLANTA) oral suspension 30 mL  30 mL Oral Q4H PRN    oxyCODONE IR (ROXICODONE) tablet 5 mg  5 mg Oral Q4H PRN    oxyCODONE IR (ROXICODONE) tablet 10 mg  10 mg Oral Q4H PRN    guaiFENesin-dextromethorphan (ROBITUSSIN DM) 100-10 mg/5 mL syrup 10 mL  10 mL Oral Q4H PRN    senna-docusate (PERICOLACE) 8.6-50 mg per tablet 2 Tab  2 Tab Oral DAILY PRN    hydrOXYzine pamoate (VISTARIL) capsule 50 mg  50 mg Oral QID PRN    LORazepam (ATIVAN) tablet 0.5 mg  0.5 mg Oral Q4H PRN    mirtazapine (REMERON) tablet 15 mg  15 mg Oral QHS PRN    cefTRIAXone (ROCEPHIN) 1 g in 0.9% sodium chloride (MBP/ADV) 50 mL  1 g IntraVENous Q24H       Discharge Exam:  Patient Vitals for the past 24 hrs:   Temp Pulse Resp BP SpO2   08/07/20 1125 97.9 °F (36.6 °C) 60 18 153/71 93 %   08/07/20 0745 97.8 °F (36.6 °C) 70 20 165/79 93 %   08/07/20 0532    184/80    08/07/20 0243 97.4 °F (36.3 °C) 66 18 179/74 92 %   08/06/20 2337 97.3 °F (36.3 °C) 64 18 160/60 95 %   08/06/20 2032    126/83    08/06/20 1946 97.3 °F (36.3 °C) 65 18 168/54 95 %   08/06/20 1524 97.1 °F (36.2 °C) 73 18 156/80 96 %   08/06/20 1520 97.1 °F (36.2 °C)         Oxygen Therapy  O2 Sat (%): 93 % (08/07/20 1125)  Pulse via Oximetry: 68 beats per minute (08/02/20 1601)  O2 Device: Room air (08/07/20 0800)  O2 Flow Rate (L/min): 1 l/min (08/05/20 2029)    Estimated body mass index is 28.94 kg/m² as calculated from the following:    Height as of this encounter: 5' 5\" (1.651 m). Weight as of this encounter: 78.9 kg (173 lb 14.4 oz). Intake/Output Summary (Last 24 hours) at 8/7/2020 1229  Last data filed at 8/7/2020 0510  Gross per 24 hour   Intake 1655 ml   Output 2000 ml   Net -345 ml       *Note that automatically entered I/Os may not be accurate; dependent on patient compliance with collection and accurate  by assistants. Physical Exam:   General:                     awake, no acute distress. Following simple commands. Well nourished. Obese. Head:                          normocephalic, atraumatic  Eyes, Ears, nose:      PERRL. Normal conjunctiva  Neck:                          supple, non-tender. Trachea midline.    Lungs:                        CTAB, no wheezing, rhonchi, rales  Cardiac:                      RRR, Normal S1 and S2. Abdomen:                  Soft, non distended, nontender, +BS  Extremities:               Warm, dry. No edema   Skin:                           No rashes, no jaundice  Neuro:              Alert, awake. Follows simple commands      Discharge Info:   Current Discharge Medication List      START taking these medications    Details   famotidine (PEPCID) 20 mg tablet Take 1 Tab by mouth two (2) times a day. Qty: 14 Tab, Refills: 0      rivaroxaban (Xarelto) 10 mg tablet Take 1 Tab by mouth daily. Qty: 14 Tab, Refills: 0      cefUROXime (CEFTIN) 500 mg tablet Take 1 Tab by mouth two (2) times a day for 4 days. Qty: 8 Tab, Refills: 0      dexAMETHasone (DECADRON) 2 mg tablet Take 2 Tabs by mouth Daily (before breakfast) for 2 days, THEN 1 Tab Daily (before breakfast) for 2 days. Qty: 6 Tab, Refills: 0         CONTINUE these medications which have CHANGED    Details   insulin detemir U-100 (Levemir Flexpen) 100 unit/mL (3 mL) inpn 10 Units by SubCUTAneous route daily. Qty: 7 Pen, Refills: 1      valsartan (DIOVAN) 40 mg tablet Take 1 Tab by mouth daily. Qty: 30 Tab, Refills: 1         CONTINUE these medications which have NOT CHANGED    Details   diclofenac EC (VOLTAREN) 75 mg EC tablet Take 1 Tab by mouth two (2) times a day. Qty: 30 Tab, Refills: 0    Associated Diagnoses: Acute midline low back pain without sciatica      rosuvastatin (CRESTOR) 20 mg tablet TAKE 1 TABLET BY MOUTH EVERY DAY  Qty: 90 Tab, Refills: 2    Associated Diagnoses: Hyperlipidemia, unspecified hyperlipidemia type      BD INSULIN PEN NEEDLE UF MINI 31 gauge x 3/16\" ndle USE 1 NEEDLE DAILY  Qty: 30 Pen Needle, Refills: 12      insulin lispro (HUMALOG) 100 unit/mL injection by SubCUTAneous route.       TRAVATAN Z 0.004 % ophthalmic solution INSTILL 1 DROP IN BOTH EYES NIGHTLY  Refills: 12      TRUE METRIX AIR GLUCOSE METER monitoring kit USE AS DIRECTED  Qty: 1 Kit, Refills: 0      BD SINGLE USE SWABS REGULAR padm       TRUE METRIX GLUCOSE TEST STRIP strip       TRUE METRIX LEVEL 1 soln       TRUEPLUS LANCETS 28 gauge misc          STOP taking these medications       furosemide (LASIX) 40 mg tablet Comments:   Reason for Stopping: Follow Up Orders:  No orders of the defined types were placed in this encounter. Follow-up Information     Follow up With Specialties Details Why Contact Info    [address] [place] [id number] [phone]          Time spent in patient discharge planning and coordination 40 minutes.     Signed:  Katarina Rai MD

## 2020-08-07 NOTE — PROGRESS NOTES
Patient is discharging to SNF at [facility] today. Transport scheduled at 1500 via [facility]. Daughter notified and in agreement. RN provided with report number.        Care Management Interventions  Mode of Transport at Discharge: BLS  Transition of Care Consult (CM Consult): SNF  Partner SNF: No  Reason Why Partner SNF Not Chosen: Positive previous encounter  Discharge Durable Medical Equipment: No  Physical Therapy Consult: Yes  Occupational Therapy Consult: Yes  Speech Therapy Consult: No  Discharge Location  Discharge Placement: Rehab Unit Subacute

## 2020-08-07 NOTE — PROGRESS NOTES
Pt is resting in bed. Respirations present on RA. No signs of distress. No needs expressed. Bed low and locked. Call light within reach.  Report given to [address] [place] [address]

## 2020-08-07 NOTE — PROGRESS NOTES
Patient upright in bed consuming lunch tray. Respirations even and unlabored on room air. Wray in place, patent and draining. Bed low and locked. Call bell within place.

## 2020-08-07 NOTE — PROGRESS NOTES
Discharge report given to Jessie Anaya At [facility]. Patient being transported via ambulance transport.

## 2020-08-07 NOTE — PROGRESS NOTES
Patient resting in bed quietly, alert but confused. Respirations even and unlabored on room air. Wray in place, patent and draining. Bed low and locked. Call light within reach.  Will continue to monitor

## 2020-08-07 NOTE — PROGRESS NOTES
Wray catheter discontinued. right midline IV removed with catheter tip in place, and left 20G IV line removed with catheter tip in place. This was done in preparation for discharge to [facility].

## 2020-08-10 ENCOUNTER — HOSPITAL ENCOUNTER (INPATIENT)
Age: 85
LOS: 17 days | Discharge: NURSING, REHAB OR LONG-TERM CARE | DRG: 682 | End: 2020-08-27
Attending: EMERGENCY MEDICINE | Admitting: INTERNAL MEDICINE
Payer: MEDICARE

## 2020-08-10 ENCOUNTER — PATIENT OUTREACH (OUTPATIENT)
Dept: CASE MANAGEMENT | Age: 85
End: 2020-08-10

## 2020-08-10 ENCOUNTER — APPOINTMENT (OUTPATIENT)
Dept: CT IMAGING | Age: 85
DRG: 682 | End: 2020-08-10
Attending: INTERNAL MEDICINE
Payer: MEDICARE

## 2020-08-10 ENCOUNTER — APPOINTMENT (OUTPATIENT)
Dept: GENERAL RADIOLOGY | Age: 85
DRG: 682 | End: 2020-08-10
Attending: EMERGENCY MEDICINE
Payer: MEDICARE

## 2020-08-10 DIAGNOSIS — R13.10 DYSPHAGIA, UNSPECIFIED TYPE: ICD-10-CM

## 2020-08-10 DIAGNOSIS — U07.1 COVID-19: ICD-10-CM

## 2020-08-10 DIAGNOSIS — G93.41 ACUTE METABOLIC ENCEPHALOPATHY: ICD-10-CM

## 2020-08-10 DIAGNOSIS — Z51.5 ENCOUNTER FOR PALLIATIVE CARE: ICD-10-CM

## 2020-08-10 DIAGNOSIS — R41.82 ALTERED MENTAL STATUS, UNSPECIFIED ALTERED MENTAL STATUS TYPE: Primary | ICD-10-CM

## 2020-08-10 DIAGNOSIS — R09.02 HYPOXEMIA: ICD-10-CM

## 2020-08-10 DIAGNOSIS — N17.9 AKI (ACUTE KIDNEY INJURY) (HCC): ICD-10-CM

## 2020-08-10 DIAGNOSIS — F02.818 LATE ONSET ALZHEIMER'S DISEASE WITH BEHAVIORAL DISTURBANCE (HCC): Chronic | ICD-10-CM

## 2020-08-10 DIAGNOSIS — G30.1 LATE ONSET ALZHEIMER'S DISEASE WITH BEHAVIORAL DISTURBANCE (HCC): Chronic | ICD-10-CM

## 2020-08-10 LAB
ALBUMIN SERPL-MCNC: 2 G/DL (ref 3.2–4.6)
ALBUMIN SERPL-MCNC: 2.3 G/DL (ref 3.2–4.6)
ALBUMIN/GLOB SERPL: 0.4 {RATIO} (ref 1.2–3.5)
ALBUMIN/GLOB SERPL: 0.5 {RATIO} (ref 1.2–3.5)
ALP SERPL-CCNC: 76 U/L (ref 50–136)
ALP SERPL-CCNC: 95 U/L (ref 50–136)
ALT SERPL-CCNC: 43 U/L (ref 12–65)
ALT SERPL-CCNC: 47 U/L (ref 12–65)
ANION GAP SERPL CALC-SCNC: 6 MMOL/L (ref 7–16)
ARTERIAL PATENCY WRIST A: YES
ARTERIAL PATENCY WRIST A: YES
AST SERPL-CCNC: 47 U/L (ref 15–37)
AST SERPL-CCNC: 73 U/L (ref 15–37)
BASE DEFICIT BLD-SCNC: 1 MMOL/L
BASE EXCESS BLD CALC-SCNC: 1 MMOL/L
BASOPHILS # BLD: 0 K/UL (ref 0–0.2)
BASOPHILS NFR BLD: 0 % (ref 0–2)
BDY SITE: ABNORMAL
BDY SITE: NORMAL
BILIRUB DIRECT SERPL-MCNC: <0.1 MG/DL
BILIRUB SERPL-MCNC: 0.4 MG/DL (ref 0.2–1.1)
BILIRUB SERPL-MCNC: 1 MG/DL (ref 0.2–1.1)
BNP SERPL-MCNC: 1644 PG/ML
BUN SERPL-MCNC: 36 MG/DL (ref 8–23)
CALCIUM SERPL-MCNC: 9.6 MG/DL (ref 8.3–10.4)
CHLORIDE SERPL-SCNC: 112 MMOL/L (ref 98–107)
CO2 BLD-SCNC: 24 MMOL/L
CO2 BLD-SCNC: 26 MMOL/L
CO2 SERPL-SCNC: 26 MMOL/L (ref 21–32)
COLLECT TIME,HTIME: 1758
COLLECT TIME,HTIME: 1905
CREAT SERPL-MCNC: 1.37 MG/DL (ref 0.6–1)
DIFFERENTIAL METHOD BLD: ABNORMAL
EOSINOPHIL # BLD: 0 K/UL (ref 0–0.8)
EOSINOPHIL NFR BLD: 0 % (ref 0.5–7.8)
ERYTHROCYTE [DISTWIDTH] IN BLOOD BY AUTOMATED COUNT: 12.8 % (ref 11.9–14.6)
GAS FLOW.O2 O2 DELIVERY SYS: ABNORMAL L/MIN
GAS FLOW.O2 O2 DELIVERY SYS: NORMAL L/MIN
GLOBULIN SER CALC-MCNC: 4.4 G/DL (ref 2.3–3.5)
GLOBULIN SER CALC-MCNC: 5.1 G/DL (ref 2.3–3.5)
GLUCOSE SERPL-MCNC: 300 MG/DL (ref 65–100)
HCO3 BLD-SCNC: 23.1 MMOL/L (ref 22–26)
HCO3 BLD-SCNC: 25.3 MMOL/L (ref 22–26)
HCT VFR BLD AUTO: 35.6 % (ref 35.8–46.3)
HGB BLD-MCNC: 12 G/DL (ref 11.7–15.4)
IMM GRANULOCYTES # BLD AUTO: 0.2 K/UL (ref 0–0.5)
IMM GRANULOCYTES NFR BLD AUTO: 1 % (ref 0–5)
LACTATE SERPL-SCNC: 1.1 MMOL/L (ref 0.4–2)
LIPASE SERPL-CCNC: 308 U/L (ref 73–393)
LYMPHOCYTES # BLD: 1.8 K/UL (ref 0.5–4.6)
LYMPHOCYTES NFR BLD: 17 % (ref 13–44)
MCH RBC QN AUTO: 28 PG (ref 26.1–32.9)
MCHC RBC AUTO-ENTMCNC: 33.7 G/DL (ref 31.4–35)
MCV RBC AUTO: 83.2 FL (ref 79.6–97.8)
MONOCYTES # BLD: 0.7 K/UL (ref 0.1–1.3)
MONOCYTES NFR BLD: 6 % (ref 4–12)
NEUTS SEG # BLD: 8.1 K/UL (ref 1.7–8.2)
NEUTS SEG NFR BLD: 75 % (ref 43–78)
NRBC # BLD: 0 K/UL (ref 0–0.2)
PCO2 BLD: 36 MMHG (ref 35–45)
PCO2 BLD: 39.7 MMHG (ref 35–45)
PH BLD: 7.41 [PH] (ref 7.35–7.45)
PH BLD: 7.42 [PH] (ref 7.35–7.45)
PLATELET # BLD AUTO: 252 K/UL (ref 150–450)
PMV BLD AUTO: 12.7 FL (ref 9.4–12.3)
PO2 BLD: 69 MMHG (ref 75–100)
PO2 BLD: 79 MMHG (ref 75–100)
POTASSIUM SERPL-SCNC: 4.7 MMOL/L (ref 3.5–5.1)
PROT SERPL-MCNC: 6.7 G/DL (ref 6.3–8.2)
PROT SERPL-MCNC: 7.1 G/DL (ref 6.3–8.2)
RBC # BLD AUTO: 4.28 M/UL (ref 4.05–5.2)
SAO2 % BLD: 94 % (ref 95–98)
SAO2 % BLD: 96 % (ref 95–98)
SERVICE CMNT-IMP: ABNORMAL
SERVICE CMNT-IMP: ABNORMAL
SERVICE CMNT-IMP: NORMAL
SERVICE CMNT-IMP: NORMAL
SODIUM SERPL-SCNC: 144 MMOL/L (ref 136–145)
SPECIMEN TYPE: ABNORMAL
SPECIMEN TYPE: NORMAL
WBC # BLD AUTO: 10.8 K/UL (ref 4.3–11.1)

## 2020-08-10 PROCEDURE — 65270000029 HC RM PRIVATE

## 2020-08-10 PROCEDURE — 70450 CT HEAD/BRAIN W/O DYE: CPT

## 2020-08-10 PROCEDURE — 0T9B70Z DRAINAGE OF BLADDER WITH DRAINAGE DEVICE, VIA NATURAL OR ARTIFICIAL OPENING: ICD-10-PCS | Performed by: INTERNAL MEDICINE

## 2020-08-10 PROCEDURE — 83605 ASSAY OF LACTIC ACID: CPT

## 2020-08-10 PROCEDURE — 82803 BLOOD GASES ANY COMBINATION: CPT

## 2020-08-10 PROCEDURE — 71045 X-RAY EXAM CHEST 1 VIEW: CPT

## 2020-08-10 PROCEDURE — 80053 COMPREHEN METABOLIC PANEL: CPT

## 2020-08-10 PROCEDURE — 36600 WITHDRAWAL OF ARTERIAL BLOOD: CPT

## 2020-08-10 PROCEDURE — 85025 COMPLETE CBC W/AUTO DIFF WBC: CPT

## 2020-08-10 PROCEDURE — 99284 EMERGENCY DEPT VISIT MOD MDM: CPT

## 2020-08-10 PROCEDURE — 80076 HEPATIC FUNCTION PANEL: CPT

## 2020-08-10 PROCEDURE — 51701 INSERT BLADDER CATHETER: CPT

## 2020-08-10 PROCEDURE — 81003 URINALYSIS AUTO W/O SCOPE: CPT

## 2020-08-10 PROCEDURE — 83690 ASSAY OF LIPASE: CPT

## 2020-08-10 PROCEDURE — 74011000258 HC RX REV CODE- 258: Performed by: INTERNAL MEDICINE

## 2020-08-10 PROCEDURE — 83880 ASSAY OF NATRIURETIC PEPTIDE: CPT

## 2020-08-10 RX ORDER — ONDANSETRON 2 MG/ML
4 INJECTION INTRAMUSCULAR; INTRAVENOUS
Status: DISCONTINUED | OUTPATIENT
Start: 2020-08-10 | End: 2020-08-27 | Stop reason: HOSPADM

## 2020-08-10 RX ORDER — SODIUM CHLORIDE 0.9 % (FLUSH) 0.9 %
5-40 SYRINGE (ML) INJECTION AS NEEDED
Status: DISCONTINUED | OUTPATIENT
Start: 2020-08-10 | End: 2020-08-27 | Stop reason: HOSPADM

## 2020-08-10 RX ORDER — ACETAMINOPHEN 650 MG/1
650 SUPPOSITORY RECTAL
Status: DISCONTINUED | OUTPATIENT
Start: 2020-08-10 | End: 2020-08-27 | Stop reason: HOSPADM

## 2020-08-10 RX ORDER — POLYETHYLENE GLYCOL 3350 17 G/17G
17 POWDER, FOR SOLUTION ORAL DAILY PRN
Status: DISCONTINUED | OUTPATIENT
Start: 2020-08-10 | End: 2020-08-27 | Stop reason: HOSPADM

## 2020-08-10 RX ORDER — SODIUM CHLORIDE 450 MG/100ML
50 INJECTION, SOLUTION INTRAVENOUS CONTINUOUS
Status: DISPENSED | OUTPATIENT
Start: 2020-08-10 | End: 2020-08-11

## 2020-08-10 RX ORDER — ENOXAPARIN SODIUM 100 MG/ML
40 INJECTION SUBCUTANEOUS DAILY
Status: DISCONTINUED | OUTPATIENT
Start: 2020-08-11 | End: 2020-08-27 | Stop reason: HOSPADM

## 2020-08-10 RX ORDER — PROMETHAZINE HYDROCHLORIDE 25 MG/1
12.5 TABLET ORAL
Status: DISCONTINUED | OUTPATIENT
Start: 2020-08-10 | End: 2020-08-27 | Stop reason: HOSPADM

## 2020-08-10 RX ORDER — ACETAMINOPHEN 325 MG/1
650 TABLET ORAL
Status: DISCONTINUED | OUTPATIENT
Start: 2020-08-10 | End: 2020-08-27 | Stop reason: HOSPADM

## 2020-08-10 RX ORDER — SODIUM CHLORIDE 0.9 % (FLUSH) 0.9 %
5-40 SYRINGE (ML) INJECTION EVERY 8 HOURS
Status: DISCONTINUED | OUTPATIENT
Start: 2020-08-10 | End: 2020-08-27 | Stop reason: HOSPADM

## 2020-08-10 RX ADMIN — SODIUM CHLORIDE 50 ML/HR: 450 INJECTION, SOLUTION INTRAVENOUS at 18:55

## 2020-08-10 RX ADMIN — Medication 5 ML: at 21:56

## 2020-08-10 NOTE — PROGRESS NOTES
Patient discharged to [facility] where she is a long term care resident, on 8/7/20. Care needs are met by staff. [facility] outreach is not indicated.

## 2020-08-10 NOTE — H&P
Hospitalist H&P Note     Admit Date:  8/10/2020  3:32 PM   Name:  Saeid Velazquez  Age: 81 y.o.  : 1935   MRN:  [id number]   PCP:  Nish Olmos MD    HPI/Subjective:   Saeid Velazquez is a 81 y.o. female with medical history significant diabetes, hypertension, recently discharged from [facility] (-) after being treated for AMS due to UTI. She was tested for COVID during the admission and required 2L O2 NC for acute hypoxic respiratory failure but was weaned down to room air after a few days. SLP evaluated the patient and has approved for pureed diet. Per conversation with the daughter during the admission, patient has been having decreased PO intake and difficulty with eating and as a result, SLP at [facility] has recommended pureed diet as well. She was minimally verbal (although unclear speech), alert and following commands with saturations above 94% on room air on discharge on  back to [facility]. P    Patient presented today due to altered mental status and decreased appetite. Patient was reportedly hypoxic upon EMS's arrival with saturating of 86% on room air. In the ED, patient is hemodynamically stable, saturating at 95% on room air. Labs are unremarkable and similar to labs on the day of discharge except for increased BUN/Cr of 36/1.37 (from 17/0.79). CXR showed no acute cardiopulmonary disease and appears slightly improved in the bilateral infiltrate. 10 systems reviewed and negative except as noted in HPI.       Past Medical History:   Diagnosis Date    Benign hypertension     Controlled type 2 diabetes mellitus with diabetic autonomic neuropathy, with long-term current use of insulin ([facility] [address]     Edema     Hyperlipidemia       Past Surgical History:   Procedure Laterality Date    HX AMPUTATION Left     Partial foot    HX CORONARY ARTERY BYPASS GRAFT      HX KNEE ARTHROSCOPY Bilateral     HX PARTIAL HYSTERECTOMY        No Known Allergies   Social History Tobacco Use    Smoking status: Never Smoker    Smokeless tobacco: Never Used   Substance Use Topics    Alcohol use: No      Family History   Problem Relation Age of Onset    Heart Disease Mother       Immunization History   Administered Date(s) Administered    Influenza High Dose Vaccine PF 10/13/2015, 11/21/2016    Influenza Vaccine (Trivalent) 01/04/2015, 01/23/2016    Pneumococcal Polysaccharide (PPSV-23) 11/01/2004, 10/04/2013, 01/04/2015     PTA Medications:  Prior to Admission Medications   Prescriptions Last Dose Informant Patient Reported? Taking? BD INSULIN PEN NEEDLE UF MINI 31 gauge x 3/16\" ndle   No No   Sig: USE 1 NEEDLE DAILY   BD SINGLE USE SWABS REGULAR padm   Yes No   TRAVATAN Z 0.004 % ophthalmic solution   Yes No   Sig: INSTILL 1 DROP IN BOTH EYES NIGHTLY   TRUE METRIX AIR GLUCOSE METER monitoring kit   No No   Sig: USE AS DIRECTED   TRUE METRIX GLUCOSE TEST STRIP strip   Yes No   TRUE METRIX LEVEL 1 soln   Yes No   TRUEPLUS LANCETS 28 gauge misc   Yes No   cefUROXime (CEFTIN) 500 mg tablet   No No   Sig: Take 1 Tab by mouth two (2) times a day for 4 days. dexAMETHasone (DECADRON) 2 mg tablet   No No   Sig: Take 2 Tabs by mouth Daily (before breakfast) for 2 days, THEN 1 Tab Daily (before breakfast) for 2 days. diclofenac EC (VOLTAREN) 75 mg EC tablet   No No   Sig: Take 1 Tab by mouth two (2) times a day. famotidine (PEPCID) 20 mg tablet   No No   Sig: Take 1 Tab by mouth two (2) times a day. insulin detemir U-100 (Levemir Flexpen) 100 unit/mL (3 mL) inpn   No No   Sig: 10 Units by SubCUTAneous route daily. insulin lispro (HUMALOG) 100 unit/mL injection   Yes No   Sig: by SubCUTAneous route. rivaroxaban (Xarelto) 10 mg tablet   No No   Sig: Take 1 Tab by mouth daily. rosuvastatin (CRESTOR) 20 mg tablet   No No   Sig: TAKE 1 TABLET BY MOUTH EVERY DAY   valsartan (DIOVAN) 40 mg tablet   No No   Sig: Take 1 Tab by mouth daily.       Facility-Administered Medications: None Objective:     Patient Vitals for the past 24 hrs:   Temp Pulse Resp BP SpO2   08/10/20 1844  (!) 53   97 %   08/10/20 1841    151/68    08/10/20 1537 97.1 °F (36.2 °C)       08/10/20 1535  (!) 57 19 139/80 95 %     Oxygen Therapy  O2 Sat (%): 97 % (08/10/20 1844)  Pulse via Oximetry: 53 beats per minute (08/10/20 1844)  O2 Device: Room air (08/10/20 1535)    Estimated body mass index is 28.79 kg/m² as calculated from the following:    Height as of this encounter: 5' 5\" (1.651 m). Weight as of this encounter: 78.5 kg (173 lb). No intake or output data in the 24 hours ending 08/10/20 1850    *Note that automatically entered I/Os may not be accurate; dependent on patient compliance with collection and accurate  by assistants. Physical Exam:  General:                     somnolent. Awakens to sternal rub. Appears comfortable wihtout acute distress  Head:                          normocephalic, atraumatic  Eyes, Ears, nose:      PERRL. Normal conjunctiva  Neck:                          supple, non-tender. Trachea midline. Lungs:                        CTAB, no wheezing, rhonchi, rales  Cardiac:                      RRR, Normal S1 and S2. Abdomen:                   Soft, non distended, nontender, +BS  Extremities:               Warm, dry.  No edema   Skin:                           No rashes, no jaundice  Neuro:              Somnolent, unable to assess    Data Review:   Recent Results (from the past 24 hour(s))   CBC WITH AUTOMATED DIFF    Collection Time: 08/10/20  3:47 PM   Result Value Ref Range    WBC 10.8 4.3 - 11.1 K/uL    RBC 4.28 4.05 - 5.2 M/uL    HGB 12.0 11.7 - 15.4 g/dL    HCT 35.6 (L) 35.8 - 46.3 %    MCV 83.2 79.6 - 97.8 FL    MCH 28.0 26.1 - 32.9 PG    MCHC 33.7 31.4 - 35.0 g/dL    RDW 12.8 11.9 - 14.6 %    PLATELET 566 735 - 617 K/uL    MPV 12.7 (H) 9.4 - 12.3 FL    ABSOLUTE NRBC 0.00 0.0 - 0.2 K/uL    DF AUTOMATED      NEUTROPHILS 75 43 - 78 %    LYMPHOCYTES 17 13 - 44 %    MONOCYTES 6 4.0 - 12.0 %    EOSINOPHILS 0 (L) 0.5 - 7.8 %    BASOPHILS 0 0.0 - 2.0 %    IMMATURE GRANULOCYTES 1 0.0 - 5.0 %    ABS. NEUTROPHILS 8.1 1.7 - 8.2 K/UL    ABS. LYMPHOCYTES 1.8 0.5 - 4.6 K/UL    ABS. MONOCYTES 0.7 0.1 - 1.3 K/UL    ABS. EOSINOPHILS 0.0 0.0 - 0.8 K/UL    ABS. BASOPHILS 0.0 0.0 - 0.2 K/UL    ABS. IMM. GRANS. 0.2 0.0 - 0.5 K/UL   METABOLIC PANEL, COMPREHENSIVE    Collection Time: 08/10/20  3:47 PM   Result Value Ref Range    Sodium 144 136 - 145 mmol/L    Potassium 4.7 3.5 - 5.1 mmol/L    Chloride 112 (H) 98 - 107 mmol/L    CO2 26 21 - 32 mmol/L    Anion gap 6 (L) 7 - 16 mmol/L    Glucose 300 (H) 65 - 100 mg/dL    BUN 36 (H) 8 - 23 MG/DL    Creatinine 1.37 (H) 0.6 - 1.0 MG/DL    GFR est AA 47 (L) >60 ml/min/1.73m2    GFR est non-AA 39 (L) >60 ml/min/1.73m2    Calcium 9.6 8.3 - 10.4 MG/DL    Bilirubin, total 0.4 0.2 - 1.1 MG/DL    ALT (SGPT) 43 12 - 65 U/L    AST (SGOT) 47 (H) 15 - 37 U/L    Alk.  phosphatase 76 50 - 136 U/L    Protein, total 6.7 6.3 - 8.2 g/dL    Albumin 2.3 (L) 3.2 - 4.6 g/dL    Globulin 4.4 (H) 2.3 - 3.5 g/dL    A-G Ratio 0.5 (L) 1.2 - 3.5     LIPASE    Collection Time: 08/10/20  3:47 PM   Result Value Ref Range    Lipase 308 73 - 393 U/L   LACTIC ACID    Collection Time: 08/10/20  3:47 PM   Result Value Ref Range    Lactic acid 1.1 0.4 - 2.0 MMOL/L   NT-PRO BNP    Collection Time: 08/10/20  3:47 PM   Result Value Ref Range    NT pro-BNP 1,644 (H) <450 PG/ML   POC G3    Collection Time: 08/10/20  5:59 PM   Result Value Ref Range    Device: ROOM AIR      pH (POC) 7.42 7.35 - 7.45      pCO2 (POC) 36.0 35 - 45 MMHG    pO2 (POC) 79 75 - 100 MMHG    HCO3 (POC) 23.1 22 - 26 MMOL/L    sO2 (POC) 96 95 - 98 %    Base deficit (POC) 1 mmol/L    Allens test (POC) YES      Site RIGHT RADIAL      Specimen type (POC) ARTERIAL      Performed by Sherwin(Giles)Arthur     CO2, POC 24 MMOL/L    Respiratory comment: PhysicianNotified     COLLECT TIME 1,758         All Micro Results     None          Current Facility-Administered Medications   Medication Dose Route Frequency    sodium chloride (NS) flush 5-40 mL  5-40 mL IntraVENous Q8H    sodium chloride (NS) flush 5-40 mL  5-40 mL IntraVENous PRN    acetaminophen (TYLENOL) tablet 650 mg  650 mg Oral Q6H PRN    Or    acetaminophen (TYLENOL) suppository 650 mg  650 mg Rectal Q6H PRN    polyethylene glycol (MIRALAX) packet 17 g  17 g Oral DAILY PRN    promethazine (PHENERGAN) tablet 12.5 mg  12.5 mg Oral Q6H PRN    Or    ondansetron (ZOFRAN) injection 4 mg  4 mg IntraVENous Q6H PRN    [START ON 8/11/2020] enoxaparin (LOVENOX) injection 40 mg  40 mg SubCUTAneous DAILY    famotidine (PF) (PEPCID) 20 mg in 0.9% sodium chloride 10 mL injection  20 mg IntraVENous Q12H    0.45% sodium chloride infusion  50 mL/hr IntraVENous CONTINUOUS     Current Outpatient Medications   Medication Sig    famotidine (PEPCID) 20 mg tablet Take 1 Tab by mouth two (2) times a day.  insulin detemir U-100 (Levemir Flexpen) 100 unit/mL (3 mL) inpn 10 Units by SubCUTAneous route daily.  valsartan (DIOVAN) 40 mg tablet Take 1 Tab by mouth daily.  diclofenac EC (VOLTAREN) 75 mg EC tablet Take 1 Tab by mouth two (2) times a day.  rosuvastatin (CRESTOR) 20 mg tablet TAKE 1 TABLET BY MOUTH EVERY DAY    BD INSULIN PEN NEEDLE UF MINI 31 gauge x 3/16\" ndle USE 1 NEEDLE DAILY    insulin lispro (HUMALOG) 100 unit/mL injection by SubCUTAneous route.  TRAVATAN Z 0.004 % ophthalmic solution INSTILL 1 DROP IN BOTH EYES NIGHTLY    TRUE METRIX AIR GLUCOSE METER monitoring kit USE AS DIRECTED    BD SINGLE USE SWABS REGULAR padm     TRUE METRIX GLUCOSE TEST STRIP strip     TRUE METRIX LEVEL 1 soln     TRUEPLUS LANCETS 28 gauge misc        Other Studies:  Xr Chest Port    Result Date: 8/10/2020  History: Hypoxia Exam: portable chest Comparison: 8/4/2020 Findings: There is a mild edema-like pattern to the lungs, similar to the findings seen previously. No change in the appearance of the mediastinal contour or osseous structures. Impressions: Stable portable chest       Assessment:     Active Hospital Problems    Diagnosis Date Noted    COVID-19 virus infection 08/10/2020    Acute metabolic encephalopathy 57/76/1302    KAILEY (acute kidney injury) ([facility] [address] 08/02/2020    Alzheimer's dementia with behavioral disturbance ([facility] [address] 08/02/2020    DNR (do not resuscitate) 08/02/2020    Benign hypertension     Diabetes mellitus, type II ([facility] [address]        Plan:       Acute metabolic encephalopathy of unclear etiology possibly worsening dementia  Possibly due to ongoing COVID infection(although she is saturating well on room air, no reported fevers), possible UTI (but was treated during last admission and UCx was mixed skin odell). - NPO for now  - SLP  - ABG, UA, Ammonia level, CT head      KAILEY  Cr of 1.37 on admission (Cr on discharge was 0.79)  - continue with ivf 1/2 NS  - monitor for hypernatremia (currently Na of 144)     COVID-19 Infection  Rapid COVID-19 test is positive on 8/3. Discharged on 8/7 with 4 days of decadron. CXR appeared improved from prior hospitalization and she is saturating well on room air.  - monitor  - O2 as needed    Recent UTI  UA positive in prior admission (8/2-8/7) but UCx was mixed odell. Received Ceftin for 4days on discharge. - repeat UA  - hold off on Abx for now    Hypertension: hold home medications for now     T2DM: hold diabetic medications for now given NPO     Alzheimer's dementia with behavioral disturbance     Spoke with Letty (Daughter) and updated.     Diet: DIET NPO  DVT PPx: heparin sq  Code: DNR   Dispo: from [employer]  Estimated Discharge: TBD based on clinical course        Labs/Imaging Reviewed. Patient is  HIGH risk due to current condition and comorbid conditions as well as requiring frequent monitoring and high risk of decline. Plan discussed with staff, patient/family and are in agreement.      Time Spent: Greater than 45 minutes was spent in reviewing charts, physical exam, discussion with patient, and answered any questions.       Signed:  Florecita Goodwin MD

## 2020-08-10 NOTE — ED PROVIDER NOTES
61-year-old female presents from nursing home with altered mental status and decreased appetite. Patient with increased difficulty feeding herself. Patient reportedly hypoxic for EMS on arrival with sats of 86% on room air. Patient has no underlying lung trouble such as asthma or COPD. Patient was just inpatient here from August 2 until August 7. At that time she was admitted for UTI and decreased mental status, with some acute kidney injury. Obtaining additional history, by talking to her daughter, . . . her presentation today seems very similar to how she presented on August 2. Daughter relates that at discharge on the seventh, the patient was more awake and alert and at least attempting to speak although her speech was not very clear. Patient ultimately tested positive for COVID during the hospitalization above, she was briefly hypoxic requiring a few liters of nasal cannula oxygen but was promptly weaned to room air after a couple days. When patient was discharged back to [facility] on August 7, she was on room air at that time. Up until mid July patient was doing well ambulating with assistance, feeding herself, and communicating with her daughter by way of Skype, and face time. However since mid July when the facility went on lockdown due to [place], the patient seems to have experienced a steady decline. She was progressively more lethargic from July 24 till July 28 at which point labs were finally drawn. She was found to have an elevated sodium and creatinine  IV fluids were started at the nursing home at that time. And antibiotics were started around July 29 the 30th. She did not seem to improve much and was hence transported for admission as above, on August 2.            Past Medical History:   Diagnosis Date    Benign hypertension     Controlled type 2 diabetes mellitus with diabetic autonomic neuropathy, with long-term current use of insulin (HCC)     Edema     Hyperlipidemia        Past Surgical History:   Procedure Laterality Date    HX AMPUTATION Left     Partial foot    HX CORONARY ARTERY BYPASS GRAFT  2005    HX KNEE ARTHROSCOPY Bilateral     HX PARTIAL HYSTERECTOMY           Family History:   Problem Relation Age of Onset    Heart Disease Mother        Social History     Socioeconomic History    Marital status:      Spouse name: Not on file    Number of children: Not on file    Years of education: Not on file    Highest education level: Not on file   Occupational History    Not on file   Social Needs    Financial resource strain: Not on file    Food insecurity     Worry: Not on file     Inability: Not on file    Transportation needs     Medical: Not on file     Non-medical: Not on file   Tobacco Use    Smoking status: Never Smoker    Smokeless tobacco: Never Used   Substance and Sexual Activity    Alcohol use: No    Drug use: No    Sexual activity: Not on file   Lifestyle    Physical activity     Days per week: Not on file     Minutes per session: Not on file    Stress: Not on file   Relationships    Social connections     Talks on phone: Not on file     Gets together: Not on file     Attends Scientologist service: Not on file     Active member of club or organization: Not on file     Attends meetings of clubs or organizations: Not on file     Relationship status: Not on file    Intimate partner violence     Fear of current or ex partner: Not on file     Emotionally abused: Not on file     Physically abused: Not on file     Forced sexual activity: Not on file   Other Topics Concern    Not on file   Social History Narrative    Not on file         ALLERGIES: Patient has no known allergies.     Review of Systems   Unable to perform ROS: Mental status change       Vitals:    08/10/20 1535 08/10/20 1537   BP: 139/80    Pulse: (!) 57    Resp: 19    Temp:  97.1 °F (36.2 °C)   SpO2: 95%    Weight: 78.5 kg (173 lb)    Height: 5' 5\" (1.651 m) Physical Exam  Vitals signs and nursing note reviewed. Constitutional:       General: She is not in acute distress. Appearance: Normal appearance. She is well-developed. She is obese. She is not ill-appearing, toxic-appearing or diaphoretic. Comments: Withdraws from pain   HENT:      Head: Normocephalic and atraumatic. Eyes:      General:         Right eye: No discharge. Left eye: No discharge. Conjunctiva/sclera: Conjunctivae normal.   Neck:      Musculoskeletal: Normal range of motion and neck supple. Pulmonary:      Effort: Pulmonary effort is normal. No respiratory distress. Musculoskeletal: Normal range of motion. Skin:     General: Skin is warm and dry. Findings: No rash. Neurological:      General: No focal deficit present. GCS: GCS eye subscore is 2. GCS verbal subscore is 1. GCS motor subscore is 5. Motor: No abnormal muscle tone. Comments: Patient somnolent, awakens and withdraws to a fingernail pinch on the dorsum of her right hand, and also her left hand. Psychiatric:         Mood and Affect: Mood normal.         Behavior: Behavior normal.          MDM  Number of Diagnoses or Management Options  Diagnosis management comments: Medical decision making note:  Elderly female who is DNR presents from a nursing home with worsening weakness and decreased mental responsiveness. Patient with milder kidney impairment than when she presented to the hospital on August 2.  UA today seems to reveal the recent UTI is cured up,   patient with COVID, chest x-ray unchanged, she required oxygen briefly while hospitalized but was discharged home appropriately on room air. Has to have small amount of supplemental oxygen now, with initial sats of 86 for EMS on room air. Will check blood gas to rule out CO2 retention.   Start gentle IV fluid hydration and admit to hospitalist for further management  This concludes the \"medical decision making note\" part of this emergency department visit note.            Procedures

## 2020-08-10 NOTE — ED NOTES
TRANSFER - OUT REPORT:    Verbal report given to Dipika Hall on Janay Lvey  being transferred to [phone] for routine progression of care       Report consisted of patients Situation, Background, Assessment and   Recommendations(SBAR). Information from the following report(s) SBAR was reviewed with the receiving nurse. Lines:   Peripheral IV 08/10/20 Right Hand (Active)        Opportunity for questions and clarification was provided.       Patient transported with:   [employer]

## 2020-08-11 LAB
ANION GAP SERPL CALC-SCNC: 6 MMOL/L (ref 7–16)
APPEARANCE UR: CLEAR
BACTERIA URNS QL MICRO: 0 /HPF
BILIRUB UR QL: NEGATIVE
BUN SERPL-MCNC: 31 MG/DL (ref 8–23)
CALCIUM SERPL-MCNC: 9.4 MG/DL (ref 8.3–10.4)
CASTS URNS QL MICRO: ABNORMAL /LPF
CHLORIDE SERPL-SCNC: 112 MMOL/L (ref 98–107)
CO2 SERPL-SCNC: 27 MMOL/L (ref 21–32)
COLOR UR: YELLOW
CREAT SERPL-MCNC: 1.16 MG/DL (ref 0.6–1)
EPI CELLS #/AREA URNS HPF: ABNORMAL /HPF
ERYTHROCYTE [DISTWIDTH] IN BLOOD BY AUTOMATED COUNT: 12.7 % (ref 11.9–14.6)
GLUCOSE BLD STRIP.AUTO-MCNC: 117 MG/DL (ref 65–100)
GLUCOSE BLD STRIP.AUTO-MCNC: 134 MG/DL (ref 65–100)
GLUCOSE BLD STRIP.AUTO-MCNC: 199 MG/DL (ref 65–100)
GLUCOSE BLD STRIP.AUTO-MCNC: 200 MG/DL (ref 65–100)
GLUCOSE SERPL-MCNC: 204 MG/DL (ref 65–100)
GLUCOSE UR STRIP.AUTO-MCNC: NEGATIVE MG/DL
HCT VFR BLD AUTO: 34.1 % (ref 35.8–46.3)
HGB BLD-MCNC: 11 G/DL (ref 11.7–15.4)
HGB UR QL STRIP: NEGATIVE
KETONES UR QL STRIP.AUTO: ABNORMAL MG/DL
LEUKOCYTE ESTERASE UR QL STRIP.AUTO: NEGATIVE
MCH RBC QN AUTO: 27.4 PG (ref 26.1–32.9)
MCHC RBC AUTO-ENTMCNC: 32.3 G/DL (ref 31.4–35)
MCV RBC AUTO: 84.8 FL (ref 79.6–97.8)
MUCOUS THREADS URNS QL MICRO: ABNORMAL /LPF
NITRITE UR QL STRIP.AUTO: NEGATIVE
NRBC # BLD: 0 K/UL (ref 0–0.2)
PH UR STRIP: 6 [PH] (ref 5–9)
PLATELET # BLD AUTO: 260 K/UL (ref 150–450)
PMV BLD AUTO: 12.8 FL (ref 9.4–12.3)
POTASSIUM SERPL-SCNC: 3.5 MMOL/L (ref 3.5–5.1)
PROT UR STRIP-MCNC: ABNORMAL MG/DL
RBC # BLD AUTO: 4.02 M/UL (ref 4.05–5.2)
RBC #/AREA URNS HPF: ABNORMAL /HPF
SODIUM SERPL-SCNC: 145 MMOL/L (ref 136–145)
SP GR UR REFRACTOMETRY: 1.02 (ref 1–1.02)
UROBILINOGEN UR QL STRIP.AUTO: 0.2 EU/DL (ref 0.2–1)
WBC # BLD AUTO: 11.6 K/UL (ref 4.3–11.1)
WBC URNS QL MICRO: ABNORMAL /HPF
YEAST URNS QL MICRO: ABNORMAL

## 2020-08-11 PROCEDURE — 97162 PT EVAL MOD COMPLEX 30 MIN: CPT

## 2020-08-11 PROCEDURE — 97110 THERAPEUTIC EXERCISES: CPT

## 2020-08-11 PROCEDURE — 92610 EVALUATE SWALLOWING FUNCTION: CPT

## 2020-08-11 PROCEDURE — 82962 GLUCOSE BLOOD TEST: CPT

## 2020-08-11 PROCEDURE — 80048 BASIC METABOLIC PNL TOTAL CA: CPT

## 2020-08-11 PROCEDURE — 97530 THERAPEUTIC ACTIVITIES: CPT

## 2020-08-11 PROCEDURE — 81003 URINALYSIS AUTO W/O SCOPE: CPT

## 2020-08-11 PROCEDURE — 74011000258 HC RX REV CODE- 258: Performed by: INTERNAL MEDICINE

## 2020-08-11 PROCEDURE — 65270000029 HC RM PRIVATE

## 2020-08-11 PROCEDURE — 77030019905 HC CATH URETH INTMIT MDII -A

## 2020-08-11 PROCEDURE — 74011636637 HC RX REV CODE- 636/637: Performed by: INTERNAL MEDICINE

## 2020-08-11 PROCEDURE — 85027 COMPLETE CBC AUTOMATED: CPT

## 2020-08-11 PROCEDURE — 74011000250 HC RX REV CODE- 250: Performed by: INTERNAL MEDICINE

## 2020-08-11 PROCEDURE — 74011250636 HC RX REV CODE- 250/636: Performed by: INTERNAL MEDICINE

## 2020-08-11 RX ORDER — INSULIN LISPRO 100 [IU]/ML
INJECTION, SOLUTION INTRAVENOUS; SUBCUTANEOUS
Status: DISCONTINUED | OUTPATIENT
Start: 2020-08-11 | End: 2020-08-19

## 2020-08-11 RX ORDER — INSULIN LISPRO 100 [IU]/ML
INJECTION, SOLUTION INTRAVENOUS; SUBCUTANEOUS
Status: DISCONTINUED | OUTPATIENT
Start: 2020-08-11 | End: 2020-08-11

## 2020-08-11 RX ORDER — DEXTROSE 50 % IN WATER (D50W) INTRAVENOUS SYRINGE
25-50 AS NEEDED
Status: DISCONTINUED | OUTPATIENT
Start: 2020-08-11 | End: 2020-08-27 | Stop reason: HOSPADM

## 2020-08-11 RX ORDER — DEXTROSE 40 %
15 GEL (GRAM) ORAL AS NEEDED
Status: DISCONTINUED | OUTPATIENT
Start: 2020-08-11 | End: 2020-08-27 | Stop reason: HOSPADM

## 2020-08-11 RX ADMIN — Medication 5 ML: at 05:59

## 2020-08-11 RX ADMIN — Medication 5 ML: at 21:07

## 2020-08-11 RX ADMIN — INSULIN LISPRO 2 UNITS: 100 INJECTION, SOLUTION INTRAVENOUS; SUBCUTANEOUS at 09:05

## 2020-08-11 RX ADMIN — INSULIN LISPRO 4 UNITS: 100 INJECTION, SOLUTION INTRAVENOUS; SUBCUTANEOUS at 12:13

## 2020-08-11 RX ADMIN — Medication 10 ML: at 13:30

## 2020-08-11 RX ADMIN — SODIUM CHLORIDE 50 ML/HR: 450 INJECTION, SOLUTION INTRAVENOUS at 09:05

## 2020-08-11 RX ADMIN — FAMOTIDINE 20 MG: 10 INJECTION INTRAVENOUS at 08:17

## 2020-08-11 RX ADMIN — ENOXAPARIN SODIUM 40 MG: 40 INJECTION SUBCUTANEOUS at 08:17

## 2020-08-11 NOTE — PROGRESS NOTES
EBENEZER CM:  Patient discharged on 8/7/2020 and was readmitted today 8/11/2020 for AMS. Patient is unable to verbalize r/t hx of stroke.

## 2020-08-11 NOTE — PROGRESS NOTES
Pt. Resting in bed and no s/s of pain or distress.  Pt. Is non-verbal. RA. NPO and awaiting speech eval.

## 2020-08-11 NOTE — PROGRESS NOTES
Hospitalist Note     Admit Date:  8/10/2020  3:32 PM   Name:  Janay Levy   Age:  81 y.o.  :  1935   MRN:  [id number]   PCP:  Ela Rubio MD  Treatment Team: Attending Provider: Connor Valencia MD; Primary Nurse: Bridgett Arriaza; Speech Language Pathologist: Dilshad Santiago, SLP; Utilization Review: Aida Vides RN; Physical Therapist: Augustin Centeno PT; Care Manager: Edgar Marinelli RN    HPI/Subjective:   Mrs. [employer] is an 82 y/o AAF with a h/o DM, HTN who was recently hospitalized from - for UTI and metabolic encephalopathy. Tested for COVID due to placement and was positive. Weaned from 2L to RA. DC to [facility] on . Was reportedly tolerating diet and following commands. Brought back on 8/10 with encephalopathy, O2 sats with EMS 86% (but no hypoxia since admission). : In bed, opens eyes, does not speak. Not following commands. No other complaints  Objective:     Patient Vitals for the past 24 hrs:   Temp Pulse Resp BP SpO2   20 1107 97.3 °F (36.3 °C) 75 18 (!) 144/91 97 %   20 0744 97 °F (36.1 °C) 68 16 181/74 97 %   20 0311 97.9 °F (36.6 °C) 63 18 166/77 96 %   20 0101 97.3 °F (36.3 °C) 60 18 180/71 93 %   08/10/20 2056 97.8 °F (36.6 °C) (!) 54 18 149/59 96 %   08/10/20 1844  (!) 53   97 %   08/10/20 1841    151/68    08/10/20 1537 97.1 °F (36.2 °C)       08/10/20 1535  (!) 57 19 139/80 95 %     Oxygen Therapy  O2 Sat (%): 97 % (20 1107)  Pulse via Oximetry: 53 beats per minute (08/10/20 1844)  O2 Device: Room air (20 0830)    Estimated body mass index is 28.79 kg/m² as calculated from the following:    Height as of this encounter: 5' 5\" (1.651 m). Weight as of this encounter: 78.5 kg (173 lb).       Intake/Output Summary (Last 24 hours) at 2020 1318  Last data filed at 2020 1006  Gross per 24 hour   Intake    Output 200 ml   Net -200 ml       *Note that automatically entered I/Os may not be accurate; dependent on patient compliance with collection and accurate  by Waluzi. General:    Well nourished. Appears stated age, chronically ill appearing as well. CV:   RRR. No murmur, rub, or gallop. Lungs:   CTAB. No wheezing, rhonchi, or rales. Abdomen:   Soft, nontender, nondistended. Extremities: Warm and dry. No cyanosis or edema. Skin:     No rashes or jaundice. Neuro:  Unable to fully assess. Opens eyes, tracks some but otherwise does no follow commands. Appears to move all extremities spontaneously, though very weak. Data Reviewed:  I have reviewed all labs, meds, and studies from the last 24 hours:  Recent Results (from the past 24 hour(s))   CBC WITH AUTOMATED DIFF    Collection Time: 08/10/20  3:47 PM   Result Value Ref Range    WBC 10.8 4.3 - 11.1 K/uL    RBC 4.28 4.05 - 5.2 M/uL    HGB 12.0 11.7 - 15.4 g/dL    HCT 35.6 (L) 35.8 - 46.3 %    MCV 83.2 79.6 - 97.8 FL    MCH 28.0 26.1 - 32.9 PG    MCHC 33.7 31.4 - 35.0 g/dL    RDW 12.8 11.9 - 14.6 %    PLATELET 064 255 - 861 K/uL    MPV 12.7 (H) 9.4 - 12.3 FL    ABSOLUTE NRBC 0.00 0.0 - 0.2 K/uL    DF AUTOMATED      NEUTROPHILS 75 43 - 78 %    LYMPHOCYTES 17 13 - 44 %    MONOCYTES 6 4.0 - 12.0 %    EOSINOPHILS 0 (L) 0.5 - 7.8 %    BASOPHILS 0 0.0 - 2.0 %    IMMATURE GRANULOCYTES 1 0.0 - 5.0 %    ABS. NEUTROPHILS 8.1 1.7 - 8.2 K/UL    ABS. LYMPHOCYTES 1.8 0.5 - 4.6 K/UL    ABS. MONOCYTES 0.7 0.1 - 1.3 K/UL    ABS. EOSINOPHILS 0.0 0.0 - 0.8 K/UL    ABS. BASOPHILS 0.0 0.0 - 0.2 K/UL    ABS. IMM.  GRANS. 0.2 0.0 - 0.5 K/UL   METABOLIC PANEL, COMPREHENSIVE    Collection Time: 08/10/20  3:47 PM   Result Value Ref Range    Sodium 144 136 - 145 mmol/L    Potassium 4.7 3.5 - 5.1 mmol/L    Chloride 112 (H) 98 - 107 mmol/L    CO2 26 21 - 32 mmol/L    Anion gap 6 (L) 7 - 16 mmol/L    Glucose 300 (H) 65 - 100 mg/dL    BUN 36 (H) 8 - 23 MG/DL    Creatinine 1.37 (H) 0.6 - 1.0 MG/DL    GFR est AA 47 (L) >60 ml/min/1.73m2    GFR est non-AA 39 (L) >60 ml/min/1.73m2    Calcium 9.6 8.3 - 10.4 MG/DL    Bilirubin, total 0.4 0.2 - 1.1 MG/DL    ALT (SGPT) 43 12 - 65 U/L    AST (SGOT) 47 (H) 15 - 37 U/L    Alk. phosphatase 76 50 - 136 U/L    Protein, total 6.7 6.3 - 8.2 g/dL    Albumin 2.3 (L) 3.2 - 4.6 g/dL    Globulin 4.4 (H) 2.3 - 3.5 g/dL    A-G Ratio 0.5 (L) 1.2 - 3.5     LIPASE    Collection Time: 08/10/20  3:47 PM   Result Value Ref Range    Lipase 308 73 - 393 U/L   LACTIC ACID    Collection Time: 08/10/20  3:47 PM   Result Value Ref Range    Lactic acid 1.1 0.4 - 2.0 MMOL/L   NT-PRO BNP    Collection Time: 08/10/20  3:47 PM   Result Value Ref Range    NT pro-BNP 1,644 (H) <450 PG/ML   HEPATIC FUNCTION PANEL    Collection Time: 08/10/20  3:47 PM   Result Value Ref Range    Protein, total 7.1 6.3 - 8.2 g/dL    Albumin 2.0 (L) 3.2 - 4.6 g/dL    Globulin 5.1 (H) 2.3 - 3.5 g/dL    A-G Ratio 0.4 (L) 1.2 - 3.5      Bilirubin, total 1.0 0.2 - 1.1 MG/DL    Bilirubin, direct <0.1 <0.4 MG/DL    Alk.  phosphatase 95 50 - 136 U/L    AST (SGOT) 73 (H) 15 - 37 U/L    ALT (SGPT) 47 12 - 65 U/L   POC G3    Collection Time: 08/10/20  5:59 PM   Result Value Ref Range    Device: ROOM AIR      pH (POC) 7.42 7.35 - 7.45      pCO2 (POC) 36.0 35 - 45 MMHG    pO2 (POC) 79 75 - 100 MMHG    HCO3 (POC) 23.1 22 - 26 MMOL/L    sO2 (POC) 96 95 - 98 %    Base deficit (POC) 1 mmol/L    Allens test (POC) YES      Site RIGHT RADIAL      Specimen type (POC) ARTERIAL      Performed by CourtneyAlvarez (Wallace)     CO2, POC 24 MMOL/L    Respiratory comment: PhysicianNotified     COLLECT TIME 1,758     POC G3    Collection Time: 08/10/20  7:08 PM   Result Value Ref Range    Device: ROOM AIR      pH (POC) 7.41 7.35 - 7.45      pCO2 (POC) 39.7 35 - 45 MMHG    pO2 (POC) 69 (L) 75 - 100 MMHG    HCO3 (POC) 25.3 22 - 26 MMOL/L    sO2 (POC) 94 (L) 95 - 98 %    Base excess (POC) 1 mmol/L    Allens test (POC) YES      Site RIGHT RADIAL      Specimen type (POC) ARTERIAL      Performed by Sherwin(Skaggs)DwayneeleRT     CO2, POC 26 MMOL/L    Respiratory comment: PhysicianNotified     COLLECT TIME 4,367     METABOLIC PANEL, BASIC    Collection Time: 08/11/20  3:47 AM   Result Value Ref Range    Sodium 145 136 - 145 mmol/L    Potassium 3.5 3.5 - 5.1 mmol/L    Chloride 112 (H) 98 - 107 mmol/L    CO2 27 21 - 32 mmol/L    Anion gap 6 (L) 7 - 16 mmol/L    Glucose 204 (H) 65 - 100 mg/dL    BUN 31 (H) 8 - 23 MG/DL    Creatinine 1.16 (H) 0.6 - 1.0 MG/DL    GFR est AA 57 (L) >60 ml/min/1.73m2    GFR est non-AA 47 (L) >60 ml/min/1.73m2    Calcium 9.4 8.3 - 10.4 MG/DL   CBC W/O DIFF    Collection Time: 08/11/20  3:47 AM   Result Value Ref Range    WBC 11.6 (H) 4.3 - 11.1 K/uL    RBC 4.02 (L) 4.05 - 5.2 M/uL    HGB 11.0 (L) 11.7 - 15.4 g/dL    HCT 34.1 (L) 35.8 - 46.3 %    MCV 84.8 79.6 - 97.8 FL    MCH 27.4 26.1 - 32.9 PG    MCHC 32.3 31.4 - 35.0 g/dL    RDW 12.7 11.9 - 14.6 %    PLATELET 419 709 - 402 K/uL    MPV 12.8 (H) 9.4 - 12.3 FL    ABSOLUTE NRBC 0.00 0.0 - 0.2 K/uL   GLUCOSE, POC    Collection Time: 08/11/20  8:20 AM   Result Value Ref Range    Glucose (POC) 199 (H) 65 - 100 mg/dL   URINALYSIS W/ RFLX MICROSCOPIC    Collection Time: 08/11/20  9:48 AM   Result Value Ref Range    Color YELLOW      Appearance CLEAR      Specific gravity 1.020 1.001 - 1.023      pH (UA) 6.0 5.0 - 9.0      Protein TRACE (A) NEG mg/dL    Glucose Negative mg/dL    Ketone TRACE (A) NEG mg/dL    Bilirubin Negative NEG      Blood Negative NEG      Urobilinogen 0.2 0.2 - 1.0 EU/dL    Nitrites Negative NEG      Leukocyte Esterase Negative NEG      WBC 0-3 0 /hpf    RBC 0-3 0 /hpf    Epithelial cells 0-3 0 /hpf    Bacteria 0 0 /hpf    Casts 5-10 0 /lpf    Mucus TRACE 0 /lpf    Yeast OCCASIONAL     GLUCOSE, POC    Collection Time: 08/11/20 11:14 AM   Result Value Ref Range    Glucose (POC) 200 (H) 65 - 100 mg/dL        Current Meds:  Current Facility-Administered Medications   Medication Dose Route Frequency    insulin lispro (HUMALOG) injection   SubCUTAneous AC&HS    dextrose 40% (GLUTOSE) oral gel 1 Tube  15 g Oral PRN    glucagon (GLUCAGEN) injection 1 mg  1 mg IntraMUSCular PRN    dextrose (D50W) injection syrg 12.5-25 g  25-50 mL IntraVENous PRN    sodium chloride (NS) flush 5-40 mL  5-40 mL IntraVENous Q8H    sodium chloride (NS) flush 5-40 mL  5-40 mL IntraVENous PRN    acetaminophen (TYLENOL) tablet 650 mg  650 mg Oral Q6H PRN    Or    acetaminophen (TYLENOL) suppository 650 mg  650 mg Rectal Q6H PRN    polyethylene glycol (MIRALAX) packet 17 g  17 g Oral DAILY PRN    promethazine (PHENERGAN) tablet 12.5 mg  12.5 mg Oral Q6H PRN    Or    ondansetron (ZOFRAN) injection 4 mg  4 mg IntraVENous Q6H PRN    enoxaparin (LOVENOX) injection 40 mg  40 mg SubCUTAneous DAILY    0.45% sodium chloride infusion  50 mL/hr IntraVENous CONTINUOUS    famotidine (PF) (PEPCID) 20 mg in 0.9% sodium chloride 10 mL injection  20 mg IntraVENous DAILY       Other Studies:  No results found for this visit on 08/10/20. Ct Head Wo Cont    Result Date: 8/10/2020  NONCONTRAST HEAD CT CLINICAL HISTORY:  Decreased level of consciousness. TECHNIQUE:  Axial images were obtained with spiral technique. Radiation dose reduction was achieved using one or all of the following techniques: automated exposure control, weight-based dosing, iterative reconstruction. COMPARISON:  August 2, 2020. REPORT:   Standard noncontrast head CT demonstrates no definite intracranial mass effect, hemorrhage, or evidence of acute geographic infarction. Extensive white matter hypodensities are most consistent with small vessel ischemic disease. Prominent calcification in the basal ganglia and dentate nuclei may be physiologic but are again noted to suggest the possibility of hyperparathyroidism/pseudohyperparathyroidism. Mild atrophy is again noted. Orbits  and paranasal sinuses are clear where imaged. Bone windows demonstrate no definite fracture or destruction.      IMPRESSION: ATROPHY WITH EXTENSIVE SMALL VESSEL ISCHEMIC DISEASE BUT NO ACUTE INTRACRANIAL ABNORMALITY IDENTIFIED AT NONCONTRAST CT. Xr Chest Port    Result Date: 8/10/2020  History: Hypoxia Exam: portable chest Comparison: 8/4/2020 Findings: There is a mild edema-like pattern to the lungs, similar to the findings seen previously. No change in the appearance of the mediastinal contour or osseous structures. Impressions: Stable portable chest       All Micro Results     None          SARS-CoV-2 Lab Results  \"Novel Coronavirus\" Test: No results found for: COV2NT   \"Emergent Disease\" Test: No results found for: EDPR  \"SARS-COV-2\" Test: No results found for: XGCOVT  \"Precision Labs\" Test: No results found for: RSLT  Rapid Test:   Lab Results   Component Value Date/Time    COVR Detected (A) 08/07/2020 02:07 PM            Assessment and Plan:     Hospital Problems as of 8/11/2020 Date Reviewed: 2/28/2017          Codes Class Noted - Resolved POA    COVID-19 virus infection ICD-10-CM: U07.1  ICD-9-CM: 079.89  8/10/2020 - Present Unknown        KAILEY (acute kidney injury) ([facility] ICD-10-CM: N17.9  ICD-9-CM: 584.9  8/2/2020 - Present Yes        * (Principal) Acute metabolic encephalopathy HBV-[id number]: G93.41  ICD-9-CM: 348.31  8/2/2020 - Present Unknown        Alzheimer's dementia with behavioral disturbance ([facility] (Chronic) ICD-10-CM: G30.9, F02.81  ICD-9-CM: 331.0, 294.11  8/2/2020 - Present Yes        DNR (do not resuscitate) (Chronic) ICD-10-CM: N16  ICD-9-CM: V49.86  8/2/2020 - Present Yes        Diabetes mellitus, type II ([facility] (Chronic) ICD-10-CM: E11.9  ICD-9-CM: 250.00  Unknown - Present Yes        Benign hypertension (Chronic) ICD-10-CM: I10  ICD-9-CM: 401.1  Unknown - Present Yes              Plan:  # Acute metabolic encephalopathy   - Work up thus far unrevealing.    - Add brain MRI    - NPO    # KAILEY   - Improving, con't gentle IVFs    # Recent COVID-19 infection   - On RA.  Completed Decadron during and after prior hospital stay. Con't conservative management. Afebrile. # HTN   - Hold home meds    # DM2   - SSI for now. DC planning/Dispo: Back to facility when able.   Diet:  DIET NPO  DVT ppx: Lovenox    Signed:  Calin Brooks MD

## 2020-08-11 NOTE — PROGRESS NOTES
Problem: Dysphagia (Adult)  Goal: *Speech Goal: (INSERT TEXT)  8/11/2020 1429 by DREW Diaz  Note: LTG: Patient will tolerate least restrictive diet without overt signs or symptoms of airway compromise. STG: Patient will tolerate po trials with speech therapy only without overt signs or symptoms of airway compromise. 8/11/2020 1429 by DREW Diaz  Outcome: Progressing Towards Goal   SPEECH LANGUAGE PATHOLOGY: DYSPHAGIA- Initial Assessment    NAME/AGE/GENDER: Reid Rodriguez is a 81 y.o. female  DATE: 8/11/2020  PRIMARY DIAGNOSIS: Acute metabolic encephalopathy [B46.68]  COVID-19 virus infection [U07.1]       ICD-10: Treatment Diagnosis: R13.11 Dysphagia, Oral Phase    RECOMMENDATIONS   DIET:   NPO  Ice chips and sips of water for pleasure    MEDICATIONS: Non-oral     ASPIRATION PRECAUTIONS  Slow rate of intake  Small bites/sips     COMPENSATORY STRATEGIES/MODIFICATIONS  None     EDUCATION:  Recommendations discussed with Hospitalist  Nursing  Patient     CONTINUATION OF SKILLED SERVICES/MEDICAL NECESSITY:  Patient is expected to demonstrate progress in  swallow strength, swallow timeliness, swallow function, and swallow safety in order to  improve swallow safety, work toward diet advancement, and decrease aspiration risk. Patient continues to require skilled intervention due to oral dysphagia. RECOMMENDATIONS for CONTINUED SPEECH THERAPY:   YES: Anticipate need for ongoing speech therapy during this hospitalization. ASSESSMENT   Patient presents with oral dysphagia secondary to impaired mentation. Prolonged bolus holding with thin and puree textures requiring moderate to max verbal cues to initiate swallow. No overt s/sx of airway compromise, but not appropriate for po diet due to oral impairment that leads to increased aspiration risk. Patient's presentation is consistent with her functional abilities at time of last admission.  She was able to progress to puree diet/thin liquids with 1:1 assist with intake with alternating 1 bite/1 sip. However, it does not appear she was meeting nutritional needs orally after discharge. Recommend NPO with non-oral medications. Sips of water and ice chips for pleasure. Will follow for ongoing po trials. May want to discuss short/long term nutrition options with family given persistent oral dysphagia with limited intake. REHABILITATION POTENTIAL FOR STATED GOALS: Fair    PLAN    FREQUENCY/DURATION: Continue to follow patient 3 times a week for duration of hospital stay to address above goals. - Recommendations for next treatment session: Next treatment will address po trials    SUBJECTIVE   Patient known to this service from recent admission. Discharged on 8/7/20 on puree diet/thin liquids, 1:1 assistance, alternate bites/sips. Poor intake upon discharge back to facility. Non-verbal and limited command following during session today. Seen as part of co-treatment with PT due to max assistance for command following and positioning. History of Present Injury/Illness: Ms. Jian Newman  has a past medical history of Benign hypertension, Controlled type 2 diabetes mellitus with diabetic autonomic neuropathy, with long-term current use of insulin ([facility] Edema, and Hyperlipidemia. . She also  has a past surgical history that includes hx coronary artery bypass graft (2005); hx knee arthroscopy (Bilateral); hx amputation (Left); and hx partial hysterectomy. Problem List:  (Impairments causing functional limitations):  Oral dysphagia    Previous Dysphagia: YES Known to this service from recent admission in 08/20. She was discharged on puree diet/thin liquids with 1:1 assistance and alternating 1 bite/1 sip due to oral holding. No history of pharyngeal dysphagia.  Per report, patient on regular diet prior to acute illness  Diet Prior to Evaluation: NPO    Orientation:   No verbal response    Pain: Pain Scale 1: Adult Nonverbal Pain Scale  Pain Intensity 1: 0    Cognitive-Linguistic Screen:  Speech Production:   No verbalization  Expressive Language:  No verbalizations  Receptive Language: Followed 1 step commands with 20% accuracy after moderate verbal and tactile cues. Cognition:   Impaired      OBJECTIVE   Oral Motor:   COMMENTS: Unable to fully assess due to impaired command following but no apparent asymmetry or weakness. Mild anterior spillage with thin liquids trials     Swallow evaluation:   Patient consumed trials of thin liquids via tsp and puree. Straw sips attempted, but unable to draw liquids up from straw. Mild anterior spillage with thin liquid sips by tsp. Bolus holding 5-15 seconds prior to initiation of swallow. Timely swallow once initiated without overt s/sx of airway compromise. Reduced oral opening with puree. Prolonged oral holding that required max tactile and verbal cues to initiate swallow. Decreased oral clearing. When presented with thin by tsp as liquid wash, she was observed to blow on spoon, expelling some pudding before drawing liquid from spoon bowl. Not a safe candidate for po intake at this time due to oral dysphagia requiring max cues to swallow. INTERDISCIPLINARY COLLABORATION: Registered Nurse and Physician  PRECAUTIONS/ALLERGIES: Patient has no known allergies.      Tool Used: Dysphagia Outcome and Severity Scale (GERALD)    Score Comments   Normal Diet  [] 7 With no strategies or extra time needed   Functional Swallow  [] 6 May have mild oral or pharyngeal delay   Mild Dysphagia  [] 5 Which may require one diet consistency restricted    Mild-Moderate Dysphagia  [] 4 With 1-2 diet consistencies restricted   Moderate Dysphagia  [] 3 With 2 or more diet consistencies restricted   Moderate-Severe Dysphagia  [] 2 With partial PO strategies (trials with ST only)   Severe Dysphagia  [] 1 With inability to tolerate any PO safely      Score:  Initial: 2 Most Recent: 2x (Date 08/11/20 )   Interpretation of Tool: The Dysphagia Outcome and Severity Scale (GERALD) is a simple, easy-to-use, 7-point scale developed to systematically rate the functional severity of dysphagia based on objective assessment and make recommendations for diet level, independence level, and type of nutrition. Current Medications:   No current facility-administered medications on file prior to encounter. Current Outpatient Medications on File Prior to Encounter   Medication Sig Dispense Refill    famotidine (PEPCID) 20 mg tablet Take 1 Tab by mouth two (2) times a day. 14 Tab 0    insulin detemir U-100 (Levemir Flexpen) 100 unit/mL (3 mL) inpn 10 Units by SubCUTAneous route daily. 7 Pen 1    valsartan (DIOVAN) 40 mg tablet Take 1 Tab by mouth daily. 30 Tab 1    diclofenac EC (VOLTAREN) 75 mg EC tablet Take 1 Tab by mouth two (2) times a day. 30 Tab 0    rosuvastatin (CRESTOR) 20 mg tablet TAKE 1 TABLET BY MOUTH EVERY DAY 90 Tab 2    BD INSULIN PEN NEEDLE UF MINI 31 gauge x 3/16\" ndle USE 1 NEEDLE DAILY 30 Pen Needle 12    insulin lispro (HUMALOG) 100 unit/mL injection by SubCUTAneous route.       TRAVATAN Z 0.004 % ophthalmic solution INSTILL 1 DROP IN BOTH EYES NIGHTLY  12    TRUE METRIX AIR GLUCOSE METER monitoring kit USE AS DIRECTED 1 Kit 0    BD SINGLE USE SWABS REGULAR padm       TRUE METRIX GLUCOSE TEST STRIP strip       TRUE METRIX LEVEL 1 soln       TRUEPLUS LANCETS 28 gauge misc          After treatment position/precautions:  Upright in bed  RN and MD notified  PT at bedside    Total Treatment Duration:   Time In: 1325  Time Out: [address] CCC-SLP

## 2020-08-11 NOTE — PROGRESS NOTES
MSN, CM:  Attempted to contact patient with no success. Spoke with patient's granddaughter \"Sherly\". Patient resides at [facility]. Patient requires assistance with ADL's and requires assistance with ambulating. Patient sees in house MD.  PT and OT consulted for evaluation and recommendations. Case Management will continue to monitor.     Care Management Interventions  PCP Verified by CM: Yes(Dr. Lona Rivera)  Mode of Transport at Discharge: BLS  Transition of Care Consult (CM Consult): SNF  Partner SNF: No  Reason Why Partner SNF Not Chosen: Location  Physical Therapy Consult: Yes  Occupational Therapy Consult: Yes  Current Support Network: Nursing Facility  Confirm Follow Up Transport: Other (see comment)(In house MD)  Katy of Choice List was Provided with Basic Dialogue that Supports the Patient's Individualized Plan of Care/Goals, Treatment Preferences and Shares the Quality Data Associated with the Providers?: Yes  Discharge Location  Discharge Placement: Skilled nursing facility

## 2020-08-11 NOTE — PROGRESS NOTES
STG:  (1.)Ms. Iraj Rawls will move from supine to sit and sit to supine , scoot up and down, and roll side to side with MAXIMAL ASSIST within 4 treatment day(s). (2.)Ms. Iraj Rawls will transfer from bed to chair and chair to bed with DEPENDENT using the least restrictive device within 4 treatment day(s). (3.)Ms. Iraj Rawls will ambulate with MAXIMAL ASSIST for 3 feet with the least restrictive device within 4 treatment day(s). LTG:  (1.)Ms. Iraj Rawls will move from supine to sit and sit to supine , scoot up and down, and roll side to side in bed with MODERATE ASSIST within 7 treatment day(s). (2.)Ms. Iraj Rawls will transfer from bed to chair and chair to bed with MODERATE ASSIST using the least restrictive device within 7 treatment day(s). (3.)Ms. Iraj Rawls will ambulate with MODERATE ASSIST for 5 feet with the least restrictive device within 7 treatment day(s). ________________________________________________________________________________________________      PHYSICAL THERAPY: Initial Assessment and PM 8/11/2020  INPATIENT: PT Visit Days : 1  [place] / Product Type: Medicare /       NAME/AGE/GENDER: Farzad Yung is a 81 y.o. female   PRIMARY DIAGNOSIS: Acute metabolic encephalopathy [C49.45]  COVID-19 virus infection [Q06.0] Acute metabolic encephalopathy Acute metabolic encephalopathy        ICD-10: Treatment Diagnosis:    Generalized Muscle Weakness (M62.81)  Other lack of cordination (R27.8)  Difficulty in walking, Not elsewhere classified (R26.2)  Other abnormalities of gait and mobility (R26.89)   Precaution/Allergies:  Patient has no known allergies. ASSESSMENT:     Ms. Iraj Rawls   is a disabled female with above diagnosis and flat affect with right sided lean,  who demonstrates with decreased transfers, ambulation and mobility below her prior functional baseline.    All transfers are currently limited at [address] [place] [address],[address] x 1 bed mobility with SLP present and positioned in the bed. Evonne Kincaid is positioned in the bed to chair position and PROM for bilateral LE's. Skilled PT is indicated for this patient's functional mobility deficits. Patient requires cues to perform exercises correctly. Overall poor to fair progress towards physical therapy goals. Goals listed above are appropriate. PT will continue efforts as patient is still below functional baseline. At this time, patient is appropriate for Co-treatment with occupational therapy when available due to patient's decreased overall endurance/tolerance levels, as well as need for high level skilled assistance to complete functional transfers/mobility and functional tasks. Evonne Kincaid is appropriate for a multidisciplinary co-treatment of PT and OT to address goals of both disciplines. This section established at most recent assessment   PROBLEM LIST (Impairments causing functional limitations):  Decreased Strength  Decreased ADL/Functional Activities  Decreased Transfer Abilities  Decreased Ambulation Ability/Technique  Decreased Balance  Increased Pain  Decreased Activity Tolerance  Decreased Pacing Skills   INTERVENTIONS PLANNED: (Benefits and precautions of physical therapy have been discussed with the patient.)  Balance Exercise  Bed Mobility  Family Education  Therapeutic Activites  Therapeutic Exercise/Strengthening     TREATMENT PLAN: Frequency/Duration: 3 for duration of hospital stay  Rehabilitation Potential For Stated Goals: [facility] (at time of discharge pending progress):    Placement: It is my opinion, based on this patient's performance to date, that Ms. Robert Isaac may benefit from intensive therapy at a [address] [place] [address] after discharge due to the functional deficits listed above that are likely to improve with skilled rehabilitation and concerns that he/she may be unsafe to be unsupervised at home due to debility and decreased mobility. .  Equipment:   None at this time              HISTORY:   History of Present Injury/Illness (Reason for Referral):  PER MD H&P   Colby Valentino is a 81 y.o. female with medical history significant diabetes, hypertension, recently discharged from [facility] (8/2-8/7) after being treated for AMS due to UTI. She was tested for COVID during the admission and required 2L O2 NC for acute hypoxic respiratory failure but was weaned down to room air after a few days. SLP evaluated the patient and has approved for pureed diet. Per conversation with the daughter during the admission, patient has been having decreased PO intake and difficulty with eating and as a result, SLP at [facility] has recommended pureed diet as well. She was minimally verbal (although unclear speech), alert and following commands with saturations above 94% on room air on discharge on 8/7 back to [facility]. P    Patient presented today due to altered mental status and decreased appetite. Patient was reportedly hypoxic upon EMS's arrival with saturating of 86% on room air. In the ED, patient is hemodynamically stable, saturating at 95% on room air. Labs are unremarkable and similar to labs on the day of discharge except for increased BUN/Cr of 36/1.37 (from 17/0.79). CXR showed no acute cardiopulmonary disease and appears slightly improved in the bilateral infiltrate. 10 systems reviewed and negative except as noted in HPI. Past Medical History/Comorbidities:   Ms. Siva Barriga  has a past medical history of Benign hypertension, Controlled type 2 diabetes mellitus with diabetic autonomic neuropathy, with long-term current use of insulin ([address] Edema, and Hyperlipidemia. Ms. Siva Barriga  has a past surgical history that includes hx coronary artery bypass graft (2005); hx knee arthroscopy (Bilateral); hx amputation (Left); and hx partial hysterectomy.   Social History/Living Environment:   Home Environment: Skilled nursing facility  One/Two Story Residence: Other (Comment)  Living Alone: No  Support Systems: Skilled nursing facility, Child(tori)  Patient Expects to be Discharged to[de-identified] Skilled nursing facility  Current DME Used/Available at Home: Wheelchair  Prior Level of Function/Work/Activity:  Limited and virtually bedbound. Dominant Side:         RIGHT    Personal Factors:          Sex:  female        Age:  81 y.o. Number of Personal Factors/Comorbidities that affect the Plan of Care: 1-2: MODERATE COMPLEXITY   EXAMINATION:   Most Recent Physical Functioning:   Gross Assessment:  AROM: Grossly decreased, non-functional  Strength: Grossly decreased, non-functional  Coordination: Grossly decreased, non-functional  Tone: Normal  Sensation: Impaired               Posture:  Posture (WDL): Exceptions to WDL  Posture Assessment: Cervical, Forward head  Balance:  Sitting: Impaired  Sitting - Static: Poor (constant support)  Sitting - Dynamic: Poor (constant support) Bed Mobility:  Rolling: Total assistance  Supine to Sit: Total assistance  Sit to Supine: Total assistance  Scooting: Total assistance  Wheelchair Mobility:     Transfers:  Sit to Stand: (unable )  Gait:            Body Structures Involved:  Bones  Joints  Muscles  Ligaments Body Functions Affected:  Neuromusculoskeletal  Movement Related  Skin Related  Metobolic/Endocrine Activities and Participation Affected:  Communication  Mobility  [address] [place] [address]   Number of elements that affect the Plan of Care: 3: MODERATE COMPLEXITY   CLINICAL PRESENTATION:   Presentation: Evolving clinical presentation with changing clinical characteristics: MODERATE COMPLEXITY   CLINICAL DECISION MAKIN [facility] Inpatient Short Form  How much difficulty does the patient currently have. .. Unable A Lot A Little None   1. Turning over in bed (including adjusting bedclothes, sheets and blankets)? [x] 1   [] 2   [] 3   [] 4   2.   Sitting down on and standing up from a chair with arms ( e.g., wheelchair, bedside commode, etc.)   [x] 1   [] 2   [] 3   [] 4   3. Moving from lying on back to sitting on the side of the bed? [x] 1   [] 2   [] 3   [] 4   How much help from another person does the patient currently need. .. Total A Lot A Little None   4. Moving to and from a bed to a chair (including a wheelchair)? [x] 1   [] 2   [] 3   [] 4   5. Need to walk in hospital room? [x] 1   [] 2   [] 3   [] 4   6. Climbing 3-5 steps with a railing? [x] 1   [] 2   [] 3   [] 4   © 2007, Trustees of [address] [place] [address] [address] [address], under license to [employer]. All rights reserved      Score:  Initial: 6 Most Recent: X (Date: -- )    Interpretation of Tool:  Represents activities that are increasingly more difficult (i.e. Bed mobility, Transfers, Gait). Medical Necessity:     Patient demonstrates   poor   rehab potential due to higher previous functional level. Reason for Services/Other Comments:  Patient continues to require skilled intervention due to   medical complications, patient unable to attend/participate in therapy as expected, and flat affect and debility with decreased mobility. .   Use of outcome tool(s) and clinical judgement create a POC that gives a: Questionable prediction of patient's progress: MODERATE COMPLEXITY            TREATMENT:   (In addition to Assessment/Re-Assessment sessions the following treatments were rendered)   Pre-treatment Symptoms/Complaints:  0/10 no pain reported. Pain: Initial:   Pain Intensity 1: 0  Post Session:  0/10      Therapeutic Activity: (    15 mins): Therapeutic activities including Bed transfers and bed mobility to improve mobility, strength, balance, and coordination. Required moderate   to promote coordination of bilateral, lower extremity(s) and promote motor control of bilateral, lower extremity(s).      Therapeutic Exercise: ( 8 mins):  Exercises per grid below to improve mobility, strength, balance, and coordination. Required minimal visual, verbal, manual, and tactile cues to promote proper body alignment, promote proper body posture, and promote proper body mechanics. Progressed repetitions as indicated. Bed to Chair seated mobility. Date:  8/11  Date:   Date:     Activity/Exercise Parameters Parameters Parameters   AP's  10 x's      HIP ABD  10 x's      Marches  10 x's     LAQ's 10 x's                         Braces/Orthotics/Lines/Etc:   O2 Device: Room air and PURE WICK   Treatment/Session Assessment:    Response to Treatment: limited mobility and flat affect and rightward trunk lean. Interdisciplinary Collaboration:   Physical Therapist  Speech Therapist  After treatment position/precautions:   Supine in bed  Bed alarm/tab alert on  Bed/Chair-wheels locked  Bed in low position  Call light within reach  RN notified  Side rails x 3   Compliance with Program/Exercises: Will assess as treatment progresses  Recommendations/Intent for next treatment session: \"Next visit will focus on advancements to more challenging activities, reduction in assistance provided, and transfers, and mobility for bilateral LE's. \".   Total Treatment Duration:  PT Patient Time In/Time Out  Time In: 1525  Time Out: Marcelino Doll

## 2020-08-11 NOTE — PROGRESS NOTES
Problem: Falls - Risk of  Goal: *Absence of Falls  Description: Document Femi Cheung Fall Risk and appropriate interventions in the flowsheet.   Outcome: Progressing Towards Goal  Note: Fall Risk Interventions:                                Problem: Patient Education: Go to Patient Education Activity  Goal: Patient/Family Education  Outcome: Progressing Towards Goal     Problem: Airway Clearance - Ineffective  Goal: Achieve or maintain patent airway  Outcome: Progressing Towards Goal     Problem: Gas Exchange - Impaired  Goal: Absence of hypoxia  Outcome: Progressing Towards Goal  Goal: Promote optimal lung function  Outcome: Progressing Towards Goal     Problem: Isolation Precautions - Risk of Spread of Infection  Goal: Prevent transmission of infectious organism to others  Outcome: Progressing Towards Goal     Problem: Nutrition Deficits  Goal: Optimize nutrtional status  Outcome: Progressing Towards Goal     Problem: Risk for Fluid Volume Deficit  Goal: Maintain normal heart rhythm  Outcome: Progressing Towards Goal  Goal: Maintain absence of muscle cramping  Outcome: Progressing Towards Goal  Goal: Maintain normal serum potassium, sodium, calcium, phosphorus, and pH  Outcome: Progressing Towards Goal     Problem: Fatigue  Goal: Verbalize increase energy and improved vitality  Outcome: Progressing Towards Goal     Problem: Loneliness or Risk for Loneliness  Goal: Demonstrate positive use of time alone when socialization is not possible  Outcome: Progressing Towards Goal     Problem: Patient Education: Go to Patient Education Activity  Goal: Patient/Family Education  Outcome: Progressing Towards Goal

## 2020-08-12 LAB
ANION GAP SERPL CALC-SCNC: 12 MMOL/L (ref 7–16)
BUN SERPL-MCNC: 21 MG/DL (ref 8–23)
CALCIUM SERPL-MCNC: 9.6 MG/DL (ref 8.3–10.4)
CHLORIDE SERPL-SCNC: 114 MMOL/L (ref 98–107)
CO2 SERPL-SCNC: 22 MMOL/L (ref 21–32)
CREAT SERPL-MCNC: 0.85 MG/DL (ref 0.6–1)
ERYTHROCYTE [DISTWIDTH] IN BLOOD BY AUTOMATED COUNT: 13 % (ref 11.9–14.6)
GLUCOSE BLD STRIP.AUTO-MCNC: 149 MG/DL (ref 65–100)
GLUCOSE BLD STRIP.AUTO-MCNC: 171 MG/DL (ref 65–100)
GLUCOSE BLD STRIP.AUTO-MCNC: 178 MG/DL (ref 65–100)
GLUCOSE BLD STRIP.AUTO-MCNC: 206 MG/DL (ref 65–100)
GLUCOSE SERPL-MCNC: 148 MG/DL (ref 65–100)
HCT VFR BLD AUTO: 36.6 % (ref 35.8–46.3)
HGB BLD-MCNC: 11.7 G/DL (ref 11.7–15.4)
MCH RBC QN AUTO: 27.5 PG (ref 26.1–32.9)
MCHC RBC AUTO-ENTMCNC: 32 G/DL (ref 31.4–35)
MCV RBC AUTO: 85.9 FL (ref 79.6–97.8)
NRBC # BLD: 0 K/UL (ref 0–0.2)
PLATELET # BLD AUTO: 267 K/UL (ref 150–450)
PMV BLD AUTO: 12.8 FL (ref 9.4–12.3)
POTASSIUM SERPL-SCNC: 3.7 MMOL/L (ref 3.5–5.1)
RBC # BLD AUTO: 4.26 M/UL (ref 4.05–5.2)
SODIUM SERPL-SCNC: 148 MMOL/L (ref 136–145)
TSH SERPL DL<=0.005 MIU/L-ACNC: 0.72 UIU/ML (ref 0.36–3.74)
WBC # BLD AUTO: 12.2 K/UL (ref 4.3–11.1)

## 2020-08-12 PROCEDURE — 92526 ORAL FUNCTION THERAPY: CPT

## 2020-08-12 PROCEDURE — 65270000029 HC RM PRIVATE

## 2020-08-12 PROCEDURE — 36415 COLL VENOUS BLD VENIPUNCTURE: CPT

## 2020-08-12 PROCEDURE — 82962 GLUCOSE BLOOD TEST: CPT

## 2020-08-12 PROCEDURE — 74011250636 HC RX REV CODE- 250/636: Performed by: INTERNAL MEDICINE

## 2020-08-12 PROCEDURE — 85027 COMPLETE CBC AUTOMATED: CPT

## 2020-08-12 PROCEDURE — 80048 BASIC METABOLIC PNL TOTAL CA: CPT

## 2020-08-12 PROCEDURE — 74011000250 HC RX REV CODE- 250: Performed by: INTERNAL MEDICINE

## 2020-08-12 PROCEDURE — 97110 THERAPEUTIC EXERCISES: CPT

## 2020-08-12 PROCEDURE — 97530 THERAPEUTIC ACTIVITIES: CPT

## 2020-08-12 PROCEDURE — 74011636637 HC RX REV CODE- 636/637: Performed by: INTERNAL MEDICINE

## 2020-08-12 PROCEDURE — 97165 OT EVAL LOW COMPLEX 30 MIN: CPT

## 2020-08-12 PROCEDURE — 84443 ASSAY THYROID STIM HORMONE: CPT

## 2020-08-12 RX ORDER — DEXTROSE MONOHYDRATE 50 MG/ML
75 INJECTION, SOLUTION INTRAVENOUS CONTINUOUS
Status: DISCONTINUED | OUTPATIENT
Start: 2020-08-12 | End: 2020-08-13

## 2020-08-12 RX ADMIN — Medication 5 ML: at 05:10

## 2020-08-12 RX ADMIN — INSULIN LISPRO 2 UNITS: 100 INJECTION, SOLUTION INTRAVENOUS; SUBCUTANEOUS at 12:14

## 2020-08-12 RX ADMIN — ENOXAPARIN SODIUM 40 MG: 40 INJECTION SUBCUTANEOUS at 08:37

## 2020-08-12 RX ADMIN — DEXTROSE MONOHYDRATE 75 ML/HR: 5 INJECTION, SOLUTION INTRAVENOUS at 16:21

## 2020-08-12 RX ADMIN — FAMOTIDINE 20 MG: 10 INJECTION INTRAVENOUS at 08:36

## 2020-08-12 RX ADMIN — Medication 10 ML: at 14:32

## 2020-08-12 RX ADMIN — INSULIN LISPRO 2 UNITS: 100 INJECTION, SOLUTION INTRAVENOUS; SUBCUTANEOUS at 08:35

## 2020-08-12 RX ADMIN — Medication 5 ML: at 22:17

## 2020-08-12 RX ADMIN — INSULIN LISPRO 4 UNITS: 100 INJECTION, SOLUTION INTRAVENOUS; SUBCUTANEOUS at 22:16

## 2020-08-12 NOTE — PROGRESS NOTES
AM assessment completed. Pt alert, nonverbal. Respirations even and unlabored. Pt is on room air. Abdomen soft and nontender. Bowel sounds active. Purewick intact. Incontinent brief in place. Pt appears comfortable. No s/s of distress or pain. All safety measures in place.

## 2020-08-12 NOTE — PROGRESS NOTES
Problem: Patient Education: Go to Patient Education Activity  Goal: Patient/Family Education  Description: 1. Patient will follow 100% of one step verbal or visual commands to increase participation during ADL performance. 2. Patient will complete self-feeding with min A and adaptive equipment as needed. 3. Patient will tolerate 25  minutes of OT treatment with 2-3 rest breaks to increase activity tolerance for ADLs. 4. Patient will complete functional transfers with mod A and adaptive equipment as needed. 5. Patient will complete upper body bathing and dressing with min A and adaptive equipment as needed. 6. Patient will complete self-grooming with min A and adaptive equipment as needed. Timeframe: 7 visits         Outcome: Progressing Towards Goal      OCCUPATIONAL THERAPY: Initial Assessment, Daily Note, and AM 8/12/2020  [place]PATIENT:    [place] / Product Type: Medicare /      NAME/AGE/GENDER: Danielle Silva is a 81 y.o. female   PRIMARY DIAGNOSIS:  Acute metabolic encephalopathy [Q52.57]  COVID-19 virus infection [H91.8] Acute metabolic encephalopathy Acute metabolic encephalopathy        ICD-10: Treatment Diagnosis:    Generalized Muscle Weakness (M62.81)  Other lack of cordination (R27.8)  Difficulty in walking, Not elsewhere classified (R26.2)   Precautions/Allergies:     Patient has no known allergies. ASSESSMENT:     Ms. [employer] presents for the above diagnoses. Upon arrival, pt supine in bed. Pt is alert, however presents with increased confusion, decreased command following, and decreased awareness of environment this session. Per chart, pt was a long-term resident at [facility] where she was requiring assistance with ADLs and ambulation. Today, pt presents with deficits in overall strength, activity tolerance, ADL performance, and functional mobility. Unable to obtain an accurate BUE screen d/t pt's decreased command following.  Pt non-interactive  and turning away from therapist during session. Pt scooted towards HOB with total A and positioned for comfort. SLP entering room to work with PT. Pt supine in bed with HOB raised. At this time, Julian Quinn is functioning below baseline for ADLs and functional mobility. Pt would benefit from skilled PT services to address OT goals and plan of care. Will pick pt up for a 2x/week trial basis. At this time, patient is appropriate for Co-treatment with physical  therapy due to patient's clinical complexity, decreased overall endurance/tolerance levels, as well as need for high level assistance and cues and intervention to complete functional transfers/mobility and functional tasks in safe manner. Julian Quinn is appropriate for a multidisciplinary co-treatment of PT and OT to address goals of both disciplines. This section established at most recent assessment   PROBLEM LIST (Impairments causing functional limitations):  Decreased Strength  Decreased ADL/Functional Activities  Decreased Transfer Abilities  Decreased Ambulation Ability/Technique  Decreased Balance  Decreased Activity Tolerance  Decreased Cognition   INTERVENTIONS PLANNED: (Benefits and precautions of occupational therapy have been discussed with the patient.)  Activities of daily living training  Adaptive equipment training  Balance training  Clothing management  Cognitive training  Community reintergration  Donning&doffing training  Neuromuscular re-eduation  Re-evaluation  Therapeutic activity  Therapeutic exercise     TREATMENT PLAN: Frequency/Duration: Follow patient 2x/week trial basis to address above goals. Rehabilitation Potential For Stated Goals: Good     REHAB RECOMMENDATIONS (at time of discharge pending progress):    Placement: It is my opinion, based on this patient's performance to date, that Ms. Mayra Navarro may benefit from intensive therapy at a [address] [place] [address] after discharge due to the functional deficits listed above that are likely to improve with skilled rehabilitation and concerns that he/she may be unsafe to be unsupervised at home due to decline in ability to safely perform ADLs and functional mobility. .  Equipment:   TBD              OCCUPATIONAL PROFILE AND HISTORY:   History of Present Injury/Illness (Reason for Referral):  See H&P  Past Medical History/Comorbidities:   Ms. Torie Frey  has a past medical history of Benign hypertension, Controlled type 2 diabetes mellitus with diabetic autonomic neuropathy, with long-term current use of insulin ([address] Edema, and Hyperlipidemia. Ms. Torie Frey  has a past surgical history that includes hx coronary artery bypass graft (2005); hx knee arthroscopy (Bilateral); hx amputation (Left); and hx partial hysterectomy. Social History/Living Environment:   Home Environment: Skilled nursing facility  One/Two Story Residence: Other (Comment)  Living Alone: No  Support Systems: [facility], Child(tori)  Patient Expects to be Discharged to[de-identified] Skilled nursing facility  Current DME Used/Available at Home: Wheelchair  Prior Level of Function/Work/Activity:  Assist with ADLs and functional mobility; resident at [facility]. Personal Factors:          Sex:  female        Age:  81 y.o. Other factors that influence how disability is experienced by the patient:  multiple co-morbidities    Number of Personal Factors/Comorbidities that affect the Plan of Care: Brief history (0):  LOW COMPLEXITY   ASSESSMENT OF OCCUPATIONAL PERFORMANCE[de-identified]   Activities of Daily Living:   Basic ADLs (From Assessment) Complex ADLs (From Assessment)   Feeding: Total assistance  Oral Facial Hygiene/Grooming: Total assistance  Bathing: Total assistance  Upper Body Dressing: Total assistance  Lower Body Dressing: Total assistance  Toileting: Total assistance Instrumental ADL  Meal Preparation: Total assistance  Homemaking:  Total assistance   Grooming/Bathing/Dressing Activities of Daily Living     Cognitive Retraining  Safety/Judgement: Decreased awareness of environment;Decreased awareness of need for assistance;Decreased awareness of need for safety;Decreased insight into deficits                       Bed/Mat Mobility  Rolling: Total assistance  Supine to Sit: Total assistance  Sit to Supine: Total assistance  Scooting: Total assistance     Most Recent Physical Functioning:   Gross Assessment:  AROM: Grossly decreased, non-functional  Strength: Grossly decreased, non-functional  Coordination: Grossly decreased, non-functional  Tone: Normal               Posture:  Posture (WDL): Exceptions to WDL  Posture Assessment: Cervical, Forward head  Balance:  Sitting: Impaired  Sitting - Static: Poor (constant support)  Sitting - Dynamic: Poor (constant support) Bed Mobility:  Rolling: Total assistance  Supine to Sit: Total assistance  Sit to Supine: Total assistance  Scooting:  Total assistance  Wheelchair Mobility:     Transfers:               Patient Vitals for the past 6 hrs:   BP BP Patient Position SpO2 Pulse   08/12/20 1111 106/51 At rest 99 % 81       Mental Status  Neurologic State: Alert, Confused  Orientation Level: (did not verbalize)  Cognition: Unable to assess (comment)  Perception: Appears intact  Perseveration: No perseveration noted  Safety/Judgement: Decreased awareness of environment, Decreased awareness of need for assistance, Decreased awareness of need for safety, Decreased insight into deficits                          Physical Skills Involved:  Balance  Strength  Activity Tolerance  Gross Motor Control Cognitive Skills Affected (resulting in the inability to perform in a timely and safe manner):  Perception  Executive Function  Immediate Memory  Short Term Recall  Long Term Memory  Sustained Attention  Divided Attention  Comprehension  Expression Psychosocial Skills Affected:  Habits/Routines  Environmental Adaptation   Number of elements that affect the Plan of Care: 5+: HIGH COMPLEXITY   CLINICAL DECISION MAKIN[address] [place] [address] AM-PAC 6 Clicks   Daily Activity Inpatient Short Form  How much help from another person does the patient currently need. .. Total A Lot A Little None   1. Putting on and taking off regular lower body clothing? [x] 1   [] 2   [] 3   [] 4   2. Bathing (including washing, rinsing, drying)? [x] 1   [] 2   [] 3   [] 4   3. Toileting, which includes using toilet, bedpan or urinal?   [x] 1   [] 2   [] 3   [] 4   4. Putting on and taking off regular upper body clothing? [x] 1   [] 2   [] 3   [] 4   5. Taking care of personal grooming such as brushing teeth? [x] 1   [] 2   [] 3   [] 4   6. Eating meals? [x] 1   [] 2   [] 3   [] 4   © , Trustees of [address] [place] [address], under license to [employer]. All rights reserved      Score:  Initial: 6 Most Recent: X (Date: -- )    Interpretation of Tool:  Represents activities that are increasingly more difficult (i.e. Bed mobility, Transfers, Gait). Medical Necessity:     Patient demonstrates   good   rehab potential due to higher previous functional level. Reason for Services/Other Comments:  Patient continues to require skilled intervention due to   medical complications and patient unable to attend/participate in therapy as expected  .    Use of outcome tool(s) and clinical judgement create a POC that gives a: LOW COMPLEXITY         TREATMENT:   (In addition to Assessment/Re-Assessment sessions the following treatments were rendered)     Pre-treatment Symptoms/Complaints:    Pain: Initial:   Pain Intensity 1: 0 /10 Post Session:  same     Assessment/Reassessment only, no treatment provided today    Braces/Orthotics/Lines/Etc:   O2 Device: Room air  Treatment/Session Assessment:    Response to Treatment:  tolerated well with no issues noted  Interdisciplinary Collaboration:   Occupational Therapist  Registered Nurse  SLP  After treatment position/precautions:   Supine in bed  Bed/Chair-wheels locked  Bed in low position  Call light within reach   Compliance with Program/Exercises: Will assess as treatment progresses. Recommendations/Intent for next treatment session: \"Next visit will focus on advancements to more challenging activities and reduction in assistance provided\".   Total Treatment Duration:  OT Patient Time In/Time Out  Time In: 0864  Time Out: [address], OT

## 2020-08-12 NOTE — PROGRESS NOTES
Problem: Airway Clearance - Ineffective  Goal: Achieve or maintain patent airway  Outcome: Progressing Towards Goal     Problem: Gas Exchange - Impaired  Goal: Absence of hypoxia  Outcome: Progressing Towards Goal     Problem: Breathing Pattern - Ineffective  Goal: Ability to achieve and maintain a regular respiratory rate  Outcome: Progressing Towards Goal     Problem: Body Temperature -  Risk of, Imbalanced  Goal: Ability to maintain a body temperature within defined limits  Outcome: Progressing Towards Goal     Problem: Isolation Precautions - Risk of Spread of Infection  Goal: Prevent transmission of infectious organism to others  Outcome: Progressing Towards Goal     Problem: Risk for Fluid Volume Deficit  Goal: Maintain normal heart rhythm  Outcome: Progressing Towards Goal     Problem: Pressure Injury - Risk of  Goal: *Prevention of pressure injury  Description: Document Christopher Scale and appropriate interventions in the flowsheet. Outcome: Progressing Towards Goal  Note: Pressure Injury Interventions:  Sensory Interventions: Turn and reposition approx. every two hours (pillows and wedges if needed), Minimize linen layers, Monitor skin under medical devices, Maintain/enhance activity level, Keep linens dry and wrinkle-free, Assess need for specialty bed    Moisture Interventions: Offer toileting Q_hr, Maintain skin hydration (lotion/cream), Minimize layers, Moisture barrier, Internal/External urinary devices, Check for incontinence Q2 hours and as needed, Apply protective barrier, creams and emollients, Absorbent underpads, Assess need for specialty bed    Activity Interventions: Assess need for specialty bed    Mobility Interventions: Turn and reposition approx.  every two hours(pillow and wedges), Pressure redistribution bed/mattress (bed type)    Nutrition Interventions: Offer support with meals,snacks and hydration, Discuss nutritional consult with provider, Document food/fluid/supplement intake    Friction and Shear Interventions: Minimize layers, Foam dressings/transparent film/skin sealants, Apply protective barrier, creams and emollients

## 2020-08-12 NOTE — PROGRESS NOTES
All goals ongoing slow progression 8/12/2020   STG:  (1.)Ms. Indigo Cannno will move from supine to sit and sit to supine , scoot up and down, and roll side to side with MAXIMAL ASSIST within 4 treatment day(s). (2.)Ms. Indigo Cannon will transfer from bed to chair and chair to bed with DEPENDENT using the least restrictive device within 4 treatment day(s). (3.)Ms. Indigo Cannon will ambulate with MAXIMAL ASSIST for 3 feet with the least restrictive device within 4 treatment day(s). LTG:  (1.)Ms. Indigo Cannon will move from supine to sit and sit to supine , scoot up and down, and roll side to side in bed with MODERATE ASSIST within 7 treatment day(s). (2.)Ms. Indigo Cannon will transfer from bed to chair and chair to bed with MODERATE ASSIST using the least restrictive device within 7 treatment day(s). (3.)Ms. Indigo Cannon will ambulate with MODERATE ASSIST for 5 feet with the least restrictive device within 7 treatment day(s). ________________________________________________________________________________________________      PHYSICAL THERAPY: Daily Note and PM 8/12/2020  INPATIENT: PT Visit Days : 2  [place] / [place] / Product Type: Medicare /       NAME/AGE/GENDER: Yury Ospina is a 81 y.o. female   PRIMARY DIAGNOSIS: Acute metabolic encephalopathy [M68.87]  COVID-19 virus infection [N29.8] Acute metabolic encephalopathy Acute metabolic encephalopathy       ICD-10: Treatment Diagnosis:    · Generalized Muscle Weakness (M62.81)  · Other lack of cordination (R27.8)  · Difficulty in walking, Not elsewhere classified (R26.2)  · Other abnormalities of gait and mobility (R26.89)   Precaution/Allergies:  Patient has no known allergies. ASSESSMENT:     Ms. Indigo Cannon   is a disabled female with above diagnosis and flat affect with right sided lean,  who demonstrates with decreased transfers, ambulation and mobility below her prior functional baseline.    All transfers are currently limited at TOTAL ASSISTANCE x 1 bed mobility. Ruth Murrieta is positioned in the bed to chair position and PROM for bilateral LE's. Skilled PT is indicated for this patient's functional mobility deficits. Patient requires cues to perform exercises correctly. Overall poor to fair progress towards physical therapy goals. Goals listed above are appropriate. PT will continue efforts as patient is still below functional baseline. At this time, patient is appropriate for Co-treatment with occupational therapy when available due to patient's decreased overall endurance/tolerance levels, as well as need for high level skilled assistance to complete functional transfers/mobility and functional tasks. Ruth Murrieta is appropriate for a multidisciplinary co-treatment of PT and OT to address goals of both disciplines. This section established at most recent assessment   PROBLEM LIST (Impairments causing functional limitations):  1. Decreased Strength  2. Decreased ADL/Functional Activities  3. Decreased Transfer Abilities  4. Decreased Ambulation Ability/Technique  5. Decreased Balance  6. Increased Pain  7. Decreased Activity Tolerance  8. Decreased Pacing Skills   INTERVENTIONS PLANNED: (Benefits and precautions of physical therapy have been discussed with the patient.)  1. Balance Exercise  2. Bed Mobility  3. Family Education  4. Therapeutic Activites  5. Therapeutic Exercise/Strengthening     TREATMENT PLAN: Frequency/Duration: 3 for duration of hospital stay  Rehabilitation Potential For Stated Goals: [facility] (at time of discharge pending progress):    Placement: It is my opinion, based on this patient's performance to date, that Ms. Grzegorz Arce may benefit from intensive therapy at a [address] [place] [address] after discharge due to the functional deficits listed above that are likely to improve with skilled rehabilitation and concerns that he/she may be unsafe to be unsupervised at home due to debility and decreased mobility. .  Equipment:    None at this time              HISTORY:   History of Present Injury/Illness (Reason for Referral):  PER MD H&P   Eleonora Vasquez is a 81 y.o. female with medical history significant diabetes, hypertension, recently discharged from [facility] (8/2-8/7) after being treated for AMS due to UTI. She was tested for COVID during the admission and required 2L O2 NC for acute hypoxic respiratory failure but was weaned down to room air after a few days. SLP evaluated the patient and has approved for pureed diet. Per conversation with the daughter during the admission, patient has been having decreased PO intake and difficulty with eating and as a result, SLP at [facility] has recommended pureed diet as well. She was minimally verbal (although unclear speech), alert and following commands with saturations above 94% on room air on discharge on 8/7 back to [facility]. P    Patient presented today due to altered mental status and decreased appetite. Patient was reportedly hypoxic upon EMS's arrival with saturating of 86% on room air. In the ED, patient is hemodynamically stable, saturating at 95% on room air. Labs are unremarkable and similar to labs on the day of discharge except for increased BUN/Cr of 36/1.37 (from 17/0.79). CXR showed no acute cardiopulmonary disease and appears slightly improved in the bilateral infiltrate. 10 systems reviewed and negative except as noted in HPI. Past Medical History/Comorbidities:   Ms. Jesus Martinez  has a past medical history of Benign hypertension, Controlled type 2 diabetes mellitus with diabetic autonomic neuropathy, with long-term current use of insulin ([address] Edema, and Hyperlipidemia. Ms. Jesus Martinez  has a past surgical history that includes hx coronary artery bypass graft (2005); hx knee arthroscopy (Bilateral); hx amputation (Left); and hx partial hysterectomy.   Social History/Living Environment:   Home Environment: Skilled nursing facility  One/Two Story Residence: Other (Comment)  Living Alone: No  Support Systems: Skilled nursing facility, Child(tori)  Patient Expects to be Discharged to[de-identified] Skilled nursing facility  Current DME Used/Available at Home: Wheelchair  Prior Level of Function/Work/Activity:  Limited and virtually bedbound. Dominant Side:         RIGHT    Personal Factors:          Sex:  female        Age:  81 y.o. Number of Personal Factors/Comorbidities that affect the Plan of Care: 1-2: MODERATE COMPLEXITY   EXAMINATION:   Most Recent Physical Functioning:   Gross Assessment:  AROM: Grossly decreased, non-functional  Strength: Grossly decreased, non-functional  Coordination: Grossly decreased, non-functional  Tone: Normal  Sensation: Impaired               Posture:  Posture (WDL): Exceptions to WDL  Posture Assessment: Cervical, Forward head  Balance:  Sitting: Impaired  Sitting - Static: Poor (constant support)  Sitting - Dynamic: Poor (constant support) Bed Mobility:  Rolling: Total assistance  Supine to Sit: Total assistance  Sit to Supine: Total assistance  Scooting: Total assistance  Wheelchair Mobility:     Transfers:     Gait:            Body Structures Involved:  1. Bones  2. Joints  3. Muscles  4. Ligaments Body Functions Affected:  1. Neuromusculoskeletal  2. Movement Related  3. Skin Related  4. Metobolic/Endocrine Activities and Participation Affected:  1. Communication  2. Mobility  3. Self Care  4. Domestic Life  5. Community, Social and [place]   Number of elements that affect the Plan of Care: 3: MODERATE COMPLEXITY   CLINICAL PRESENTATION:   Presentation: Evolving clinical presentation with changing clinical characteristics: MODERATE COMPLEXITY   CLINICAL DECISION MAKIN [facility] Mobility Inpatient Short Form  How much difficulty does the patient currently have. .. Unable A Lot A Little None   1.   Turning over in bed (including adjusting bedclothes, sheets and blankets)? [x] 1   [] 2   [] 3   [] 4   2. Sitting down on and standing up from a chair with arms ( e.g., wheelchair, bedside commode, etc.)   [x] 1   [] 2   [] 3   [] 4   3. Moving from lying on back to sitting on the side of the bed? [x] 1   [] 2   [] 3   [] 4   How much help from another person does the patient currently need. .. Total A Lot A Little None   4. Moving to and from a bed to a chair (including a wheelchair)? [x] 1   [] 2   [] 3   [] 4   5. Need to walk in hospital room? [x] 1   [] 2   [] 3   [] 4   6. Climbing 3-5 steps with a railing? [x] 1   [] 2   [] 3   [] 4   © 2007, Trustees of [address] [place], [place] [id number], under license to [employer]. All rights reserved      Score:  Initial: 6 Most Recent: X (Date: -- )    Interpretation of Tool:  Represents activities that are increasingly more difficult (i.e. Bed mobility, Transfers, Gait). Medical Necessity:     · Patient demonstrates   · poor  ·  rehab potential due to higher previous functional level. Reason for Services/Other Comments:  · Patient continues to require skilled intervention due to   · medical complications, patient unable to attend/participate in therapy as expected, and flat affect and debility with decreased mobility. · .   Use of outcome tool(s) and clinical judgement create a POC that gives a: Questionable prediction of patient's progress: MODERATE COMPLEXITY            TREATMENT:   (In addition to Assessment/Re-Assessment sessions the following treatments were rendered)   Pre-treatment Symptoms/Complaints:  0/10 no pain reported. Pain: Initial:   Pain Intensity 1: 0  Post Session:  0/10      Therapeutic Activity: (    15 mins): Therapeutic activities including Bed transfers and bed mobility to improve mobility, strength, balance, and coordination. Required moderate   to promote coordination of bilateral, lower extremity(s) and promote motor control of bilateral, lower extremity(s).      Therapeutic Exercise: ( 8 mins):  Exercises per grid below to improve mobility, strength, balance, and coordination. Required minimal visual, verbal, manual, and tactile cues to promote proper body alignment, promote proper body posture, and promote proper body mechanics. Progressed repetitions as indicated. Bed to Chair seated mobility. Date:  8/12  Date:   Date:     Activity/Exercise Parameters Parameters Parameters   AP's  10 x's      HIP ABD  10 x's      Marches  10 x's     LAQ's 10 x's                         Braces/Orthotics/Lines/Etc:   · O2 Device: Room air and PURE WICK   Treatment/Session Assessment:    · Response to Treatment: limited mobility and flat affect with rightward trunk lean. · Interdisciplinary Collaboration:   o Physical Therapist  · After treatment position/precautions:   o Supine in bed  o Bed alarm/tab alert on  o Bed/Chair-wheels locked  o Bed in low position  o Call light within reach  o RN notified  o Side rails x 3   · Compliance with Program/Exercises: Noncompliant much of the time  · Recommendations/Intent for next treatment session: \"Next visit will focus on advancements to more challenging activities, reduction in assistance provided, and transfers, and mobility for bilateral LE's. \".   Total Treatment Duration:  PT Patient Time In/Time Out  Time In: 1535  Time Out: [address], [place]

## 2020-08-12 NOTE — PROGRESS NOTES
Shift assessment:  Pt alert, non verbal. Unable to assess LOC. On room air. Respirations even, unlabored. Lung sounds diminished bilaterally. No distress noted. HR regular. Abdomen soft, non tender. Bowel sounds hypoactive. Pt has external catheter. 1+ pitting edema of right lower extremity, trace edema of left lower extremity. Toes absent on left foot. No visual signs of pain. Call light within reach. Bed in low, locked position. Bed alarm on.

## 2020-08-12 NOTE — PROGRESS NOTES
Problem: Dysphagia (Adult)  Goal: *Speech Goal: (INSERT TEXT)  8/11/2020 1429 by DREW Galindo  Note: LTG: Patient will tolerate least restrictive diet without overt signs or symptoms of airway compromise. STG: Patient will tolerate po trials with speech therapy only without overt signs or symptoms of airway compromise. 8/11/2020 1429 by DREW Galindo  Outcome: Progressing Towards Goal  SPEECH LANGUAGE PATHOLOGY: DYSPHAGIA- Daily Note 1    NAME/AGE/GENDER: Dea Selby is a 81 y.o. female  DATE: 8/12/2020  PRIMARY DIAGNOSIS: Acute metabolic encephalopathy [I27.31]  COVID-19 virus infection [U07.1]      ICD-10: Treatment Diagnosis: R13.11 Dysphagia, Oral Phase    RECOMMENDATIONS   DIET:    NPO    MEDICATIONS: Non-oral     ASPIRATION PRECAUTIONS  · NPO     COMPENSATORY STRATEGIES/MODIFICATIONS  · None     EDUCATION:  · Recommendations discussed with Hospitalist  · Patient     CONTINUATION OF SKILLED SERVICES/MEDICAL NECESSITY:   Patient is expected to demonstrate progress in  swallow strength, swallow timeliness, swallow function and swallow safety in order to  improve swallow safety, work toward diet advancement and decrease aspiration risk.  Patient continues to require skilled intervention due to dysphagia. RECOMMENDATIONS for CONTINUED SPEECH THERAPY:   YES: Anticipate need for ongoing speech therapy during this hospitalization. ASSESSMENT   Patient with reduced interaction with clinician today. No eye contact or tracking. No command following or verbalizations. No response to thermal stim on lips, and no manipulation of ice chip with digitally placed in oral cavity by clinician. Patient is not appropriate for po intake at this time due to limited interaction/engagement as well as impaired acceptance of po trials. Oral dysphagia also noted during prior admission.  She was able to initiate puree diet/thin liquids prior to discharge, but is now readmitted due to poor intake. May want to consider alternative nutrition/hydration/medication as she has had poor intake both last admission and thus far this admission. COMPLIANCE WITH PROGRAM/EXERCISES: Will assess as treatment progresses  REHABILITATION POTENTIAL FOR STATED GOALS: Fair    PLAN    FREQUENCY/DURATION: Continue to follow patient 3 times a week for duration of hospital stay to address above goals. - Recommendations for next treatment session: Next treatment will address po trials as interaction improves    SUBJECTIVE   Patient alert in bed, but no eye contact with clinician. No command following or verbalizations. Seen as part of co-treatment with OT in attempt to improve functional participation in treatment session    Problem List:  (Impairments causing functional limitations):  1. Oral dysphagia    Orientation:   No verbalizations, no response to questiosn    Pain: Pain Scale 1: Adult Nonverbal Pain Scale  Pain Intensity 1: 0    OBJECTIVE   Patient seen for po trials. No command following for oral acceptance of trials. Thermal stim applied to lips, but no response and no attempts to remove water from lips. Single ice chips manually presented by clinician despite no oral opening by patient. No apparent manipulation of ice chips despite verbal and tactile cues. She was not appropriate for additional po trials. Unable to assess pharyngeal stage of swallow due to severity of oral dysphagia. INTERDISCIPLINARY COLLABORATION: Physician  PRECAUTIONS/ALLERGIES: Patient has no known allergies.      Tool Used: Dysphagia Outcome and Severity Scale (GERALD)    Score Comments   Normal Diet  [] 7 With no strategies or extra time needed   Functional Swallow  [] 6 May have mild oral or pharyngeal delay   Mild Dysphagia  [] 5 Which may require one diet consistency restricted    Mild-Moderate Dysphagia  [] 4 With 1-2 diet consistencies restricted   Moderate Dysphagia  [] 3 With 2 or more diet consistencies restricted Moderate-Severe Dysphagia  [] 2 With partial PO strategies (trials with ST only)   Severe Dysphagia  [] 1 With inability to tolerate any PO safely      Score:  Initial: 2 Most Recent: x (Date 08/12/20 )   Interpretation of Tool: The Dysphagia Outcome and Severity Scale (GERALD) is a simple, easy-to-use, 7-point scale developed to systematically rate the functional severity of dysphagia based on objective assessment and make recommendations for diet level, independence level, and type of nutrition.      After treatment position/precautions:  · Upright in bed  · MD notified  · OT at bedside    Total Treatment Duration:   Time In: 0957  Time Out: [address] [facility] [facility]

## 2020-08-12 NOTE — PROGRESS NOTES
Hourly rounds done. Pt denies pain, nausea, vomiting. Remains NPO. All needs met at this time. Unsuccessful x2 w/ lab draws this am. Will relay to lab & AM RN.

## 2020-08-12 NOTE — PROGRESS NOTES
Problem: Falls - Risk of  Goal: *Absence of Falls  Description: Document Carlos Lowe Fall Risk and appropriate interventions in the flowsheet. Outcome: Progressing Towards Goal  Note: Fall Risk Interventions:       Mentation Interventions: Bed/chair exit alarm         Elimination Interventions: Patient to call for help with toileting needs              Problem: Airway Clearance - Ineffective  Goal: Achieve or maintain patent airway  Outcome: Progressing Towards Goal     Problem: Gas Exchange - Impaired  Goal: Absence of hypoxia  Outcome: Progressing Towards Goal     Problem: Breathing Pattern - Ineffective  Goal: Ability to achieve and maintain a regular respiratory rate  Outcome: Progressing Towards Goal     Problem: Body Temperature -  Risk of, Imbalanced  Goal: Ability to maintain a body temperature within defined limits  Outcome: Progressing Towards Goal     Problem: Pressure Injury - Risk of  Goal: *Prevention of pressure injury  Description: Document Christopher Scale and appropriate interventions in the flowsheet.   Outcome: Progressing Towards Goal  Note: Pressure Injury Interventions:  Sensory Interventions: Keep linens dry and wrinkle-free    Moisture Interventions: Absorbent underpads    Activity Interventions: Pressure redistribution bed/mattress(bed type)    Mobility Interventions: Pressure redistribution bed/mattress (bed type)    Nutrition Interventions: Document food/fluid/supplement intake

## 2020-08-12 NOTE — PROGRESS NOTES
Hospitalist Note     Admit Date:  8/10/2020  3:32 PM   Name:  Daisha Greenberg   Age:  81 y.o.  :  1935   MRN:  [id number]   PCP:  Kathleen Singer MD  Treatment Team: Attending Provider: Meena De La Rosa MD; Primary Nurse: Jenaro Middleton; Utilization Review: Lorrene Bosworth, FARRAH; Care Manager: Rodriguez Cotter, RN; Physical Therapist: Ke Cooper, DANIELLE    HPI/Subjective:   Mrs. [employer] is an 80 y/o AAF with a h/o DM, HTN who was recently hospitalized from - for UTI and metabolic encephalopathy. Tested for COVID due to placement and was positive. Weaned from 2L to RA. DC to [facility] on . Was reportedly tolerating diet and following commands. Brought back on 8/10 with encephalopathy, O2 sats with EMS 86% (but no hypoxia since admission). : In bed, eyes open, does not follow commands. No other complaints  Objective:     Patient Vitals for the past 24 hrs:   Temp Pulse Resp BP SpO2   20 1516 97.3 °F (36.3 °C) 78 18 156/80 98 %   20 1111 98.1 °F (36.7 °C) 81 16 106/51 99 %   20 0729 97.7 °F (36.5 °C) 82 15 103/53 100 %   20 0258 97.8 °F (36.6 °C) 71 18 134/72 97 %   20 2331 97.6 °F (36.4 °C) 62 18 159/73 97 %   20 97.4 °F (36.3 °C) (!) 57 16 153/63 96 %   20 1610 97.4 °F (36.3 °C) 66 16 161/64 98 %     Oxygen Therapy  O2 Sat (%): 98 % (20 1516)  Pulse via Oximetry: 53 beats per minute (08/10/20 1844)  O2 Device: Room air (20 0707)    Estimated body mass index is 28.79 kg/m² as calculated from the following:    Height as of this encounter: 5' 5\" (1.651 m). Weight as of this encounter: 78.5 kg (173 lb). Intake/Output Summary (Last 24 hours) at 2020 1553  Last data filed at 2020 1111  Gross per 24 hour   Intake 777 ml   Output 600 ml   Net 177 ml       *Note that automatically entered I/Os may not be accurate; dependent on patient compliance with collection and accurate  by techs.     General: Well nourished. Awake. Obese. CV:   RRR. No murmur, rub, or gallop. Lungs:   CTAB. No wheezing, rhonchi, or rales. Abdomen:   Soft, nontender, nondistended. Extremities: Warm and dry. No cyanosis or edema. Skin:     No rashes or jaundice. Neuro:  Eyes are open, she is able to move some in the bed independently. Moves all extremities spontaneously but does not follow commands.      Data Reviewed:  I have reviewed all labs, meds, and studies from the last 24 hours:  Recent Results (from the past 24 hour(s))   GLUCOSE, POC    Collection Time: 08/11/20  4:44 PM   Result Value Ref Range    Glucose (POC) 134 (H) 65 - 100 mg/dL   GLUCOSE, POC    Collection Time: 08/11/20  9:03 PM   Result Value Ref Range    Glucose (POC) 117 (H) 65 - 228 mg/dL   METABOLIC PANEL, BASIC    Collection Time: 08/12/20  6:40 AM   Result Value Ref Range    Sodium 148 (H) 136 - 145 mmol/L    Potassium 3.7 3.5 - 5.1 mmol/L    Chloride 114 (H) 98 - 107 mmol/L    CO2 22 21 - 32 mmol/L    Anion gap 12 7 - 16 mmol/L    Glucose 148 (H) 65 - 100 mg/dL    BUN 21 8 - 23 MG/DL    Creatinine 0.85 0.6 - 1.0 MG/DL    GFR est AA >60 >60 ml/min/1.73m2    GFR est non-AA >60 >60 ml/min/1.73m2    Calcium 9.6 8.3 - 10.4 MG/DL   CBC W/O DIFF    Collection Time: 08/12/20  6:40 AM   Result Value Ref Range    WBC 12.2 (H) 4.3 - 11.1 K/uL    RBC 4.26 4.05 - 5.2 M/uL    HGB 11.7 11.7 - 15.4 g/dL    HCT 36.6 35.8 - 46.3 %    MCV 85.9 79.6 - 97.8 FL    MCH 27.5 26.1 - 32.9 PG    MCHC 32.0 31.4 - 35.0 g/dL    RDW 13.0 11.9 - 14.6 %    PLATELET 213 091 - 073 K/uL    MPV 12.8 (H) 9.4 - 12.3 FL    ABSOLUTE NRBC 0.00 0.0 - 0.2 K/uL   TSH 3RD GENERATION    Collection Time: 08/12/20  6:40 AM   Result Value Ref Range    TSH 0.716 0.358 - 3.740 uIU/mL   GLUCOSE, POC    Collection Time: 08/12/20  7:35 AM   Result Value Ref Range    Glucose (POC) 171 (H) 65 - 100 mg/dL   GLUCOSE, POC    Collection Time: 08/12/20 11:15 AM   Result Value Ref Range    Glucose (POC) 178 (H) 65 - 100 mg/dL        Current Meds:  Current Facility-Administered Medications   Medication Dose Route Frequency    dextrose 5% infusion  75 mL/hr IntraVENous CONTINUOUS    insulin lispro (HUMALOG) injection   SubCUTAneous AC&HS    dextrose 40% (GLUTOSE) oral gel 1 Tube  15 g Oral PRN    glucagon (GLUCAGEN) injection 1 mg  1 mg IntraMUSCular PRN    dextrose (D50W) injection syrg 12.5-25 g  25-50 mL IntraVENous PRN    sodium chloride (NS) flush 5-40 mL  5-40 mL IntraVENous Q8H    sodium chloride (NS) flush 5-40 mL  5-40 mL IntraVENous PRN    acetaminophen (TYLENOL) tablet 650 mg  650 mg Oral Q6H PRN    Or    acetaminophen (TYLENOL) suppository 650 mg  650 mg Rectal Q6H PRN    polyethylene glycol (MIRALAX) packet 17 g  17 g Oral DAILY PRN    promethazine (PHENERGAN) tablet 12.5 mg  12.5 mg Oral Q6H PRN    Or    ondansetron (ZOFRAN) injection 4 mg  4 mg IntraVENous Q6H PRN    enoxaparin (LOVENOX) injection 40 mg  40 mg SubCUTAneous DAILY    famotidine (PF) (PEPCID) 20 mg in 0.9% sodium chloride 10 mL injection  20 mg IntraVENous DAILY       Other Studies:  No results found for this visit on 08/10/20. No results found.     All Micro Results     None          SARS-CoV-2 Lab Results  \"Novel Coronavirus\" Test: No results found for: COV2NT   \"Emergent Disease\" Test: No results found for: EDPR  \"SARS-COV-2\" Test: No results found for: XGCOVT  \"Precision Labs\" Test: No results found for: RSLT  Rapid Test:   Lab Results   Component Value Date/Time    COVR Detected (A) 08/07/2020 02:07 PM            Assessment and Plan:     Hospital Problems as of 8/12/2020 Date Reviewed: 2/28/2017          Codes Class Noted - Resolved POA    COVID-19 virus infection ICD-10-CM: U07.1  ICD-9-CM: 079.89  8/10/2020 - Present Unknown        KAILEY (acute kidney injury) ([facility] [address] ICD-10-CM: N17.9  ICD-9-CM: 584.9  8/2/2020 - Present Yes        * (Principal) Acute metabolic encephalopathy OXE-49-FT: G93.41  ICD-9-CM: 348.31  8/2/2020 - Present Unknown        Alzheimer's dementia with behavioral disturbance ([facility] [address] (Chronic) ICD-10-CM: G30.9, F02.81  ICD-9-CM: 331.0, 294.11  8/2/2020 - Present Yes        DNR (do not resuscitate) (Chronic) ICD-10-CM: Z47  ICD-9-CM: V49.86  8/2/2020 - Present Yes        Diabetes mellitus, type II ([facility] [address] (Chronic) ICD-10-CM: E11.9  ICD-9-CM: 250.00  Unknown - Present Yes        Benign hypertension (Chronic) ICD-10-CM: I10  ICD-9-CM: 401.1  Unknown - Present Yes              Plan:  # Acute metabolic encephalopathy              - Work up thus far unrevealing. UA improved, ABG normal. Sodium up a little today but her mentation changes predate any abnormality with that. TSH normal.              - Head CT with chronic ischemic changes, but unable to tolerate any PO meds currently. Ordered brain MRI 8/11 but won't do because she is COVID +, unless specialist deems it necessary. I spoke to her granddaughter and daughter today who confirm a change in her mentation from discharge last Friday. Will ask for Neurology eval tomorrow to further decide need for MRI, EEG, etc.       # KAILEY              - Resolved. # HyperNa   - Poor PO intake. D5W. BMP tomorrow. # Recent COVID-19 infection              - On RA. Completed Decadron during and after prior hospital stay. Con't conservative management. Afebrile.      # HTN              - Hold home meds     # DM2              - SSI for now. DC planning/Dispo: Unclear, likely back to facility when able pending clinical course.   Diet:  DIET NPO  DVT ppx: Lovenox    Signed:  Gregg Bennett MD

## 2020-08-12 NOTE — DISCHARGE INSTRUCTIONS
Advance Care Planning  People with COVID-19 may have no symptoms, mild symptoms, such as fever, cough, and shortness of breath or they may have more severe illness, developing severe and fatal pneumonia. As a result, Advance Care Planning with attention to naming a health care decision maker (someone you trust to make healthcare decisions for you if you could not speak for yourself) and sharing other health care preferences is important BEFORE a possible health crisis. Please contact your Primary Care Provider to discuss Advance Care Planning. Preventing the Spread of Coronavirus Disease 2019 in Homes and Residential Communities  For the most recent information go to [email or web address]    Prevention steps for People with confirmed or suspected COVID-19 (including persons under investigation) who do not need to be hospitalized  and   People with confirmed COVID-19 who were hospitalized and determined to be medically stable to go home    Your healthcare provider and public health staff will evaluate whether you can be cared for at home. If it is determined that you do not need to be hospitalized and can be isolated at home, you will be monitored by staff from your local or state health department. You should follow the prevention steps below until a healthcare provider or local or state health department says you can return to your normal activities. Stay home except to get medical care  People who are mildly ill with COVID-19 are able to isolate at home during their illness. You should restrict activities outside your home, except for getting medical care. Do not go to work, school, or public areas. Avoid using public transportation, ride-sharing, or taxis. Separate yourself from other people and animals in your home  People: As much as possible, you should stay in a specific room and away from other people in your home.  Also, you should use a separate bathroom, if available. Animals: You should restrict contact with pets and other animals while you are sick with COVID-19, just like you would around other people. Although there have not been reports of pets or other animals becoming sick with COVID-19, it is still recommended that people sick with COVID-19 limit contact with animals until more information is known about the virus. When possible, have another member of your household care for your animals while you are sick. If you are sick with COVID-19, avoid contact with your pet, including petting, snuggling, being kissed or licked, and sharing food. If you must care for your pet or be around animals while you are sick, wash your hands before and after you interact with pets and wear a facemask. Call ahead before visiting your doctor  If you have a medical appointment, call the healthcare provider and tell them that you have or may have COVID-19. This will help the healthcare providers office take steps to keep other people from getting infected or exposed. Wear a facemask  You should wear a facemask when you are around other people (e.g., sharing a room or vehicle) or pets and before you enter a healthcare providers office. If you are not able to wear a facemask (for example, because it causes trouble breathing), then people who live with you should not stay in the same room with you, or they should wear a facemask if they enter your room. Cover your coughs and sneezes  Cover your mouth and nose with a tissue when you cough or sneeze. Throw used tissues in a lined trash can. Immediately wash your hands with soap and water for at least 20 seconds or, if soap and water are not available, clean your hands with an alcohol-based hand  that contains at least 60% alcohol.   Clean your hands often  Wash your hands often with soap and water for at least 20 seconds, especially after blowing your nose, coughing, or sneezing; going to the bathroom; and before eating or preparing food. If soap and water are not readily available, use an alcohol-based hand  with at least 60% alcohol, covering all surfaces of your hands and rubbing them together until they feel dry. Soap and water are the best option if hands are visibly dirty. Avoid touching your eyes, nose, and mouth with unwashed hands. Avoid sharing personal household items  You should not share dishes, drinking glasses, cups, eating utensils, towels, or bedding with other people or pets in your home. After using these items, they should be washed thoroughly with soap and water. Clean all high-touch surfaces everyday  High touch surfaces include counters, tabletops, doorknobs, bathroom fixtures, toilets, phones, keyboards, tablets, and bedside tables. Also, clean any surfaces that may have blood, stool, or body fluids on them. Use a household cleaning spray or wipe, according to the label instructions. Labels contain instructions for safe and effective use of the cleaning product including precautions you should take when applying the product, such as wearing gloves and making sure you have good ventilation during use of the product. Monitor your symptoms  Seek prompt medical attention if your illness is worsening (e.g., difficulty breathing). Before seeking care, call your healthcare provider and tell them that you have, or are being evaluated for, COVID-19. Put on a facemask before you enter the facility. These steps will help the healthcare providers office to keep other people in the office or waiting room from getting infected or exposed. Ask your healthcare provider to call the local or state health department. Persons who are placed under active monitoring or facilitated self-monitoring should follow instructions provided by their local health department or occupational health professionals, as appropriate. When working with your local health department check their available hours.   If you have a medical emergency and need to call 911, notify the dispatch personnel that you have, or are being evaluated for COVID-19. If possible, put on a facemask before emergency medical services arrive. Discontinuing home isolation  Patients with confirmed COVID-19 should remain under home isolation precautions until the risk of secondary transmission to others is thought to be low. The decision to discontinue home isolation precautions should be made on a case-by-case basis, in consultation with healthcare providers and state and local health departments. Patient Education        Coronavirus (QOUUD-57): Care Instructions  Overview  The coronavirus disease (COVID-19) is caused by a virus. Symptoms may include a fever, a cough, and shortness of breath. It mainly spreads person-to-person through droplets from coughing and sneezing. The virus also can spread when people are in close contact with someone who is infected. Most people have mild symptoms and can take care of themselves at home. If their symptoms get worse, they may need care in a hospital. There is no medicine to fight the virus. It's important to not spread the virus to others. If you have COVID-19, wear a face cover anytime you are around other people. You need to isolate yourself while you are sick. Your doctor or local public health official will tell you when you no longer need to be isolated. Leave your home only if you need to get medical care. Follow-up care is a key part of your treatment and safety. Be sure to make and go to all appointments, and call your doctor if you are having problems. It's also a good idea to know your test results and keep a list of the medicines you take. How can you care for yourself at home? · Get extra rest. It can help you feel better. · Drink plenty of fluids. This helps replace fluids lost from fever. Fluids also help ease a scratchy throat.  Water, soup, fruit juice, and hot tea with lemon are good choices. · Take acetaminophen (such as Tylenol) to reduce a fever. It may also help with muscle aches. Read and follow all instructions on the label. · Sponge your body with lukewarm water to help with fever. Don't use cold water or ice. · Use petroleum jelly on sore skin. This can help if the skin around your nose and lips becomes sore from rubbing a lot with tissues. Tips for isolation  · Wear a cloth face cover when you are around other people. It can help stop the spread of the virus when you cough or sneeze. · Limit contact with people in your home. If possible, stay in a separate bedroom and use a separate bathroom. · If you have to leave home, avoid crowds and try to stay at least 6 feet away from other people. · Avoid contact with pets and other animals. · Cover your mouth and nose with a tissue when you cough or sneeze. Then throw it in the trash right away. · Wash your hands often, especially after you cough or sneeze. Use soap and water, and scrub for at least 20 seconds. If soap and water aren't available, use an alcohol-based hand . · Don't share personal household items. These include bedding, towels, cups and glasses, and eating utensils. · [address] in the warmest water allowed for the fabric type, and dry it completely. It's okay to wash other people's laundry with yours. · Clean and disinfect your home every day. Use household  and disinfectant wipes or sprays. Take special care to clean things that you grab with your hands. These include doorknobs, remote controls, phones, and handles on your refrigerator and microwave. And don't forget countertops, tabletops, bathrooms, and computer keyboards. When should you call for help? [id number] anytime you think you may need emergency care. For example, call if you have life-threatening symptoms, such as:  · You have severe trouble breathing. (You can't talk at all.)  · You have constant chest pain or pressure.   · You are severely dizzy or lightheaded. · You are confused or can't think clearly. · Your face and lips have a blue color. · You pass out (lose consciousness) or are very hard to wake up. Call your doctor now or seek immediate medical care if:  · You have moderate trouble breathing. (You can't speak a full sentence.)  · You are coughing up blood (more than about 1 teaspoon). · You have signs of low blood pressure. These include feeling lightheaded; being too weak to stand; and having cold, pale, clammy skin. Watch closely for changes in your health, and be sure to contact your doctor if:  · Your symptoms get worse. · You are not getting better as expected. Call before you go to the doctor's office. Follow their instructions. And wear a cloth face cover. Current as of: May 8, 2020               Content Version: 12.5  © 2006-2020 [employer]. Care instructions adapted under license by [employer] (which disclaims liability or warranty for this information). If you have questions about a medical condition or this instruction, always ask your healthcare professional. [address] any warranty or liability for your use of this information.

## 2020-08-13 ENCOUNTER — APPOINTMENT (OUTPATIENT)
Dept: GENERAL RADIOLOGY | Age: 85
DRG: 682 | End: 2020-08-13
Attending: INTERNAL MEDICINE
Payer: MEDICARE

## 2020-08-13 LAB
ANION GAP SERPL CALC-SCNC: 6 MMOL/L (ref 7–16)
BUN SERPL-MCNC: 14 MG/DL (ref 8–23)
CALCIUM SERPL-MCNC: 9.3 MG/DL (ref 8.3–10.4)
CHLORIDE SERPL-SCNC: 112 MMOL/L (ref 98–107)
CO2 SERPL-SCNC: 27 MMOL/L (ref 21–32)
CREAT SERPL-MCNC: 0.89 MG/DL (ref 0.6–1)
ERYTHROCYTE [DISTWIDTH] IN BLOOD BY AUTOMATED COUNT: 13.1 % (ref 11.9–14.6)
GLUCOSE BLD STRIP.AUTO-MCNC: 162 MG/DL (ref 65–100)
GLUCOSE BLD STRIP.AUTO-MCNC: 177 MG/DL (ref 65–100)
GLUCOSE BLD STRIP.AUTO-MCNC: 214 MG/DL (ref 65–100)
GLUCOSE BLD STRIP.AUTO-MCNC: 214 MG/DL (ref 65–100)
GLUCOSE SERPL-MCNC: 218 MG/DL (ref 65–100)
HCT VFR BLD AUTO: 32.9 % (ref 35.8–46.3)
HGB BLD-MCNC: 10.9 G/DL (ref 11.7–15.4)
MCH RBC QN AUTO: 28 PG (ref 26.1–32.9)
MCHC RBC AUTO-ENTMCNC: 33.1 G/DL (ref 31.4–35)
MCV RBC AUTO: 84.6 FL (ref 79.6–97.8)
NRBC # BLD: 0 K/UL (ref 0–0.2)
PLATELET # BLD AUTO: 250 K/UL (ref 150–450)
PMV BLD AUTO: 13.2 FL (ref 9.4–12.3)
POTASSIUM SERPL-SCNC: 3.5 MMOL/L (ref 3.5–5.1)
RBC # BLD AUTO: 3.89 M/UL (ref 4.05–5.2)
SODIUM SERPL-SCNC: 145 MMOL/L (ref 136–145)
WBC # BLD AUTO: 8.8 K/UL (ref 4.3–11.1)

## 2020-08-13 PROCEDURE — 77030008771 HC TU NG SALEM SUMP -A

## 2020-08-13 PROCEDURE — 74011636637 HC RX REV CODE- 636/637: Performed by: INTERNAL MEDICINE

## 2020-08-13 PROCEDURE — 74018 RADEX ABDOMEN 1 VIEW: CPT

## 2020-08-13 PROCEDURE — 74011250636 HC RX REV CODE- 250/636: Performed by: INTERNAL MEDICINE

## 2020-08-13 PROCEDURE — 82962 GLUCOSE BLOOD TEST: CPT

## 2020-08-13 PROCEDURE — 36415 COLL VENOUS BLD VENIPUNCTURE: CPT

## 2020-08-13 PROCEDURE — 74011000250 HC RX REV CODE- 250: Performed by: INTERNAL MEDICINE

## 2020-08-13 PROCEDURE — 87635 SARS-COV-2 COVID-19 AMP PRB: CPT

## 2020-08-13 PROCEDURE — 77030018798 HC PMP KT ENTRL FED COVD -A

## 2020-08-13 PROCEDURE — 74011250637 HC RX REV CODE- 250/637: Performed by: INTERNAL MEDICINE

## 2020-08-13 PROCEDURE — 99222 1ST HOSP IP/OBS MODERATE 55: CPT | Performed by: PSYCHIATRY & NEUROLOGY

## 2020-08-13 PROCEDURE — 65270000029 HC RM PRIVATE

## 2020-08-13 PROCEDURE — 80048 BASIC METABOLIC PNL TOTAL CA: CPT

## 2020-08-13 PROCEDURE — 85027 COMPLETE CBC AUTOMATED: CPT

## 2020-08-13 RX ADMIN — FAMOTIDINE 20 MG: 10 INJECTION INTRAVENOUS at 21:57

## 2020-08-13 RX ADMIN — Medication 5 ML: at 05:43

## 2020-08-13 RX ADMIN — FAMOTIDINE 20 MG: 10 INJECTION INTRAVENOUS at 08:00

## 2020-08-13 RX ADMIN — INSULIN LISPRO 2 UNITS: 100 INJECTION, SOLUTION INTRAVENOUS; SUBCUTANEOUS at 17:20

## 2020-08-13 RX ADMIN — DEXTROSE MONOHYDRATE 75 ML/HR: 5 INJECTION, SOLUTION INTRAVENOUS at 05:09

## 2020-08-13 RX ADMIN — INSULIN LISPRO 4 UNITS: 100 INJECTION, SOLUTION INTRAVENOUS; SUBCUTANEOUS at 07:59

## 2020-08-13 RX ADMIN — ENOXAPARIN SODIUM 40 MG: 40 INJECTION SUBCUTANEOUS at 08:01

## 2020-08-13 RX ADMIN — Medication 10 ML: at 21:59

## 2020-08-13 RX ADMIN — Medication 1 EACH: at 10:06

## 2020-08-13 RX ADMIN — INSULIN LISPRO 4 UNITS: 100 INJECTION, SOLUTION INTRAVENOUS; SUBCUTANEOUS at 11:14

## 2020-08-13 NOTE — PROGRESS NOTES
Two unsuccessful attempts to draw blood on pt done at 0350. Charge nurse notified.  Hard stick form sent to lab at [phone]

## 2020-08-13 NOTE — CONSULTS
Comprehensive Nutrition Assessment    Type and Reason for Visit: Initial, Consult(Management of TF (Hospitalist))    Nutrition Recommendations/Plan: Once NGT placement confirmed. Initiate Glucerna 1.5 via NGT at 25 ml/hour, progress by 10 ml/hour every 8 hours to goal rate of 45 ml/hour. Water flush 35ml/hour. At goal will provide 1620 kcal (100%% estimated calorie needs), 89 grams protein (100% estimated protein needs) and 1660ml free fluid (~1ml/kcal). IV Fluids: discontinue IVF with initiation of TF. EN labs: BMP daily, Mg and Phos MWF. Nutrition support electrolyte replacement protocol active and placed on MAR. Nutrition Assessment:  Patient with PMH of HTN, DM2, edema, HLD, dementia. She was recently discharged from [facility] after being treated for AMS from UTI. She presneted back with AMS and hypooxia. She has tested positive for COVID. Neuro following and AMS thought likely multifactoral.   Patient is NPO per SLP evaluation. Called and spoke with RN due to patient's current mental status. She states NGT has been placed and just waiting on KUB for confirmation. Per notes, patient was on puree diet her last time with poor PO and long period of NPO. Per H&P daughter reported poor PO at facility also. Abdomen: hypoactive BS, no BM documented since admisssion  Edema: Trace  Labs remarkable for: Glucose 218, POC glucose 149-214 last 24 hrs  IVF: D5 @ 75 ml/hr  Pertinent Medications: SSI (8 yesterday, 8 thus far today), Miralax PRN    Estimated Daily Nutrient Needs:  Energy (kcal):  3210-1492(30-85 kcal/kg (78.5 kg))  Protein (g):  79-98(20% kcal)         Nutrition Related Findings:  deferred due to isolation      Current Nutrition Therapies:   DIET NPO    Anthropometric Measures:  · Height:  5' 5\" (165.1 cm)  · Current Body Wt:  78.5 kg (173 lb 1 oz)(unknown weight source)   · Body mass index is 28.8 kg/m². · BMI Category: Overweight (BMI 25.0-29. 9)   · Review of weight history there is a potential for 37# weight loss in 11 days. This is unlikely due to PO intake. Likely related to weight source or stated vs estimated vs measures and potentially fluid with history of edema. Nutrition Diagnosis:   · Inadequate oral intake related to cognitive or neurological impairment as evidenced by (AMS, NPO per SLP)      Nutrition Interventions:   Food and/or Nutrient Delivery: Continue NPO, Start tube feeding  Coordination of Nutrition Care: (Discussed with Randi Pratt RN)    Goals:  Meet at least 75% nutrition needs with EN within 7 days       Nutrition Monitoring and Evaluation:   Food/Nutrient Intake Outcomes: Food and nutrient intake    Discharge Planning:     Too soon to determine     [address] [place], LD on 8/13/2020 at 4:49 PM  Contact: [phone]

## 2020-08-13 NOTE — PROGRESS NOTES
SPEECH PATHOLOGY NOTE:    Spoke with patient's RN this morning. She reports that patient remains minimally interactive with no command following or verbalizations. No functional improvement since prior speech therapy session on 8/12/20. Possible MRI planned. Hold speech therapy today. Will follow up for ongoing po trials at later time/date as participation/engagement improves.      [address] CCC-SLP

## 2020-08-13 NOTE — CONSULTS
Consult    Patient: Colby Valentino MRN: [id number]     YOB: 1935  Age: 81 y.o. Sex: female      Subjective:      Colby Valentino is a 81 y.o. female who is being seen for altered mental status. This is a patient with acute metabolic encephalopathy, history of dementia, and positive COVID-19 test who is admitted with altered mental status. Neurology has been consulted to assist in management and determination of above-mentioned. During my encounter with the patient she is nonverbal.  She does not follow commands. She moves all limbs. She is not able to provide history.     Past Medical History:   Diagnosis Date    Benign hypertension     Controlled type 2 diabetes mellitus with diabetic autonomic neuropathy, with long-term current use of insulin (HCC)     Edema     Hyperlipidemia      Past Surgical History:   Procedure Laterality Date    HX AMPUTATION Left     Partial foot    HX CORONARY ARTERY BYPASS GRAFT  2005    HX KNEE ARTHROSCOPY Bilateral     HX PARTIAL HYSTERECTOMY        Family History   Problem Relation Age of Onset    Heart Disease Mother      Social History     Tobacco Use    Smoking status: Never Smoker    Smokeless tobacco: Never Used   Substance Use Topics    Alcohol use: No      Current Facility-Administered Medications   Medication Dose Route Frequency Provider Last Rate Last Dose    famotidine (PF) (PEPCID) 20 mg in 0.9% sodium chloride 10 mL injection  20 mg IntraVENous Q12H Senaida Kawasaki, MD        lip protectant (BLISTEX) ointment 1 Each  1 Each Topical PRN Peggy Crawford MD   1 Each at 08/13/20 1006    dextrose 5% infusion  75 mL/hr IntraVENous CONTINUOUS Peggy Crawford MD 75 mL/hr at 08/13/20 0509 75 mL/hr at 08/13/20 0509    insulin lispro (HUMALOG) injection   SubCUTAneous AC&HS Evelyn Archuleta MD   4 Units at 08/13/20 0759    dextrose 40% (GLUTOSE) oral gel 1 Tube  15 g Oral PRN Evelyn Archuleta MD        glucagon (GLUCAGEN) injection 1 mg  1 mg IntraMUSCular PRN Cherylann Apgar, MD        dextrose (D50W) injection syrg 12.5-25 g  25-50 mL IntraVENous PRN Cherylann Apgar, MD        sodium chloride (NS) flush 5-40 mL  5-40 mL IntraVENous Q8H Loney Cushing, MD   5 mL at 20 0543    sodium chloride (NS) flush 5-40 mL  5-40 mL IntraVENous PRN Loney Cushing, MD        acetaminophen (TYLENOL) tablet 650 mg  650 mg Oral Q6H PRN Loney Cushing, MD        Or   Eldershane Riggins acetaminophen (TYLENOL) suppository 650 mg  650 mg Rectal Q6H PRN Loney Cushing, MD        polyethylene glycol (MIRALAX) packet 17 g  17 g Oral DAILY PRN Loney Cushing, MD        promethazine (PHENERGAN) tablet 12.5 mg  12.5 mg Oral Q6H PRN Loney Cushing, MD        Or    ondansetron (ZOFRAN) injection 4 mg  4 mg IntraVENous Q6H PRN Loney Cushing, MD        enoxaparin (LOVENOX) injection 40 mg  40 mg SubCUTAneous DAILY Loney Cushing, MD   40 mg at 20 0801        No Known Allergies    Review of Systems:  Could not obtain due to altered mental status      Objective:     Vitals:    20 2005 20 2218 20 0345 20 0709   BP: 110/60 121/61 135/61 116/63   Pulse: 76 73 69 78   Resp: 16 16 14 18   Temp: 97.3 °F (36.3 °C) 97.6 °F (36.4 °C) 97.7 °F (36.5 °C) 98.3 °F (36.8 °C)   SpO2: 94% 95% 93% 98%   Weight:       Height:            Physical Exam:  General - Well developed, well nourished, in no apparent distress. Pleasant but non-conversant. HEENT - Normocephalic, atraumatic. She has a small amount of blood on the left corner of her mouth. Neck - Supple without masses, no bruits   Cardiovascular - Regular rate and rhythm. Normal S1, S2 without murmurs, rubs, or gallops. Lungs - Clear to auscultation. Abdomen - Soft, nontender with normal bowel sounds. Extremities - Peripheral pulses intact. No edema and no rashes. Neurological examination -   Comprehension, attention, and memory are very poor.   She is nonverbal.  On cranial nerve examination pupils are equal round and reactive to light. Cannot participate in funduscopic examination, visual acuity testing, and visual fields but she does blink to threat. Face is symmetric and sensation is intact to light touch. Hearing is intact to finger rustle bilaterally. Motor examination - There is normal muscle tone with significant paratonia. Power appears full throughout but not following commands. Muscle stretch reflexes are normoactive and there are no pathological reflexes present. Sensory examination, cerebellar examination, gait and stance could not be performed due to altered mental status      Lab Results   Component Value Date/Time    Cholesterol, total 131 05/26/2017 10:23 AM    HDL Cholesterol 55 05/26/2017 10:23 AM    LDL, calculated 61 05/26/2017 10:23 AM    VLDL, calculated 15 05/26/2017 10:23 AM    Triglyceride 74 05/26/2017 10:23 AM        Lab Results   Component Value Date/Time    Hemoglobin A1c (POC) 8.6 (A) 09/05/2017 11:50 AM        CT Results (most recent): Personally Reviewed   Results from [place] encounter on 08/10/20   CT HEAD WO CONT    Narrative NONCONTRAST HEAD CT    CLINICAL HISTORY:  Decreased level of consciousness. TECHNIQUE:  Axial images were obtained with spiral technique. Radiation dose  reduction was achieved using one or all of the following techniques: automated  exposure control, weight-based dosing, iterative reconstruction. COMPARISON:  August 2, 2020. REPORT:   Standard noncontrast head CT demonstrates no definite intracranial  mass effect, hemorrhage, or evidence of acute geographic infarction. Extensive  white matter hypodensities are most consistent with small vessel ischemic  disease. Prominent calcification in the basal ganglia and dentate nuclei may be  physiologic but are again noted to suggest the possibility of  hyperparathyroidism/pseudohyperparathyroidism. Mild atrophy is again noted.    Orbits  and paranasal sinuses are clear where imaged. Bone windows demonstrate  no definite fracture or destruction. Impression IMPRESSION:     ATROPHY WITH EXTENSIVE SMALL VESSEL ISCHEMIC DISEASE BUT NO  ACUTE INTRACRANIAL ABNORMALITY IDENTIFIED AT NONCONTRAST CT. Assessment:     71-year-old woman with acute kidney injury, Alzheimer's disease, and COVID-19 infection. Encephalopathy is secondary to metabolic abnormalities, ZWAOV-60, and poor neurological baseline while hospitalized. Patients with neurodegenerative conditions have a prolonged course of delirium which can persist beyond 30 days. If she improves then it will be a very slow process. Plan:     Management of Delirium     Non-pharmacological intervention  · Reorient the patient throughout the day  · Window open and lights on during the day. Lights off, television off, noises down during the night. If able, decrease nursing checks at night  · Therapies as often as possible  · Avoid restraints to the best of your ability   · Avoid sensory deprivation by using glasses and hearing aids, if applicable       Pharmacological intervention  · Replete electrolyte abnormalities and correct metabolic abnormalities  · Limit benzodiazepines, antihistamines, narcotics, anticholinergics. Preference towards Precedex for sedation over fentanyl and benzodiazepines/GABAa agonists. · For dangerous behavior/aggression to self or others can consider Zyprexa or Seroquel if benefits outweigh risk  · For persistent insomnia can use melatonin four hours prior to bedtime or Seroquel 25 mg at bedtime     No further recommendations. Neurology will sign off.     Signed By: Diogenes Baxter DO     August 13, 2020

## 2020-08-13 NOTE — PROGRESS NOTES
Hospitalist Note     Admit Date:  8/10/2020  3:32 PM   Name:  Klaudia Montiel   Age:  81 y.o.  :  1935   MRN:  [id number]   PCP:  Kirk Gilliam MD  Treatment Team: Attending Provider: Taj James MD; Primary Nurse: Selina Rai; Utilization Review: Tri Hernandez RN; Care Manager: Bea Cordova RN; Consulting Provider: Flaquito Arceo DO; Physical Therapist: Imelda Awan PT    HPI/Subjective:   Mrs. Morales Current is an 80 y/o AAF with a h/o DM, HTN who was recently hospitalized from - for UTI and metabolic encephalopathy. Tested for COVID due to placement and was positive. Weaned from 2L to RA. DC to [facility] on . Was reportedly tolerating diet and following commands. Brought back on 8/10 with encephalopathy, O2 sats with EMS 86% (but no hypoxia since admission). Remained non-verbal, no respiratory issues. : No change. Still non-verbal. Moves all extremities. RA O2. Not participating with therapies. No other complaints  Objective:     Patient Vitals for the past 24 hrs:   Temp Pulse Resp BP SpO2   20 1103 96.9 °F (36.1 °C) 73 18 126/73 97 %   20 0709 98.3 °F (36.8 °C) 78 18 116/63 98 %   20 0345 97.7 °F (36.5 °C) 69 14 135/61 93 %   20 2218 97.6 °F (36.4 °C) 73 16 121/61 95 %   20 2005 97.3 °F (36.3 °C) 76 16 110/60 94 %   20 1516 97.3 °F (36.3 °C) 78 18 156/80 98 %     Oxygen Therapy  O2 Sat (%): 97 % (20 1103)  Pulse via Oximetry: 53 beats per minute (08/10/20 1844)  O2 Device: Room air (20 0705)    Estimated body mass index is 28.79 kg/m² as calculated from the following:    Height as of this encounter: 5' 5\" (1.651 m). Weight as of this encounter: 78.5 kg (173 lb).       Intake/Output Summary (Last 24 hours) at 2020 1306  Last data filed at 2020 1103  Gross per 24 hour   Intake 900 ml   Output 245 ml   Net 655 ml       *Note that automatically entered I/Os may not be accurate; dependent on patient compliance with collection and accurate  by [employer]. General:    Chronically ill appearing. Non-verbal.  CV:   RRR. No murmur, rub, or gallop. Lungs:   CTAB. No wheezing, rhonchi, or rales. Abdomen:   Soft, nontender, nondistended. Extremities: Warm and dry. No cyanosis or edema. Skin:     No rashes or jaundice. Neuro: Moves all extremities. Non-verbal. Does not follow commands.     Data Reviewed:  I have reviewed all labs, meds, and studies from the last 24 hours:  Recent Results (from the past 24 hour(s))   GLUCOSE, POC    Collection Time: 08/12/20  4:18 PM   Result Value Ref Range    Glucose (POC) 149 (H) 65 - 100 mg/dL   GLUCOSE, POC    Collection Time: 08/12/20  9:29 PM   Result Value Ref Range    Glucose (POC) 206 (H) 65 - 100 mg/dL   GLUCOSE, POC    Collection Time: 08/13/20  7:13 AM   Result Value Ref Range    Glucose (POC) 214 (H) 65 - 089 mg/dL   METABOLIC PANEL, BASIC    Collection Time: 08/13/20  7:39 AM   Result Value Ref Range    Sodium 145 136 - 145 mmol/L    Potassium 3.5 3.5 - 5.1 mmol/L    Chloride 112 (H) 98 - 107 mmol/L    CO2 27 21 - 32 mmol/L    Anion gap 6 (L) 7 - 16 mmol/L    Glucose 218 (H) 65 - 100 mg/dL    BUN 14 8 - 23 MG/DL    Creatinine 0.89 0.6 - 1.0 MG/DL    GFR est AA >60 >60 ml/min/1.73m2    GFR est non-AA >60 >60 ml/min/1.73m2    Calcium 9.3 8.3 - 10.4 MG/DL   CBC W/O DIFF    Collection Time: 08/13/20  7:39 AM   Result Value Ref Range    WBC 8.8 4.3 - 11.1 K/uL    RBC 3.89 (L) 4.05 - 5.2 M/uL    HGB 10.9 (L) 11.7 - 15.4 g/dL    HCT 32.9 (L) 35.8 - 46.3 %    MCV 84.6 79.6 - 97.8 FL    MCH 28.0 26.1 - 32.9 PG    MCHC 33.1 31.4 - 35.0 g/dL    RDW 13.1 11.9 - 14.6 %    PLATELET 089 225 - 405 K/uL    MPV 13.2 (H) 9.4 - 12.3 FL    ABSOLUTE NRBC 0.00 0.0 - 0.2 K/uL   SARS-COV-2    Collection Time: 08/13/20  8:52 AM   Result Value Ref Range    Specimen source Nasopharyngeal      COVID-19 rapid test Detected (A) NOTD      SARS CoV-2 PENDING    GLUCOSE, POC Collection Time: 08/13/20 11:09 AM   Result Value Ref Range    Glucose (POC) 214 (H) 65 - 100 mg/dL        Current Meds:  Current Facility-Administered Medications   Medication Dose Route Frequency    famotidine (PF) (PEPCID) 20 mg in 0.9% sodium chloride 10 mL injection  20 mg IntraVENous Q12H    lip protectant (BLISTEX) ointment 1 Each  1 Each Topical PRN    dextrose 5% infusion  75 mL/hr IntraVENous CONTINUOUS    insulin lispro (HUMALOG) injection   SubCUTAneous AC&HS    dextrose 40% (GLUTOSE) oral gel 1 Tube  15 g Oral PRN    glucagon (GLUCAGEN) injection 1 mg  1 mg IntraMUSCular PRN    dextrose (D50W) injection syrg 12.5-25 g  25-50 mL IntraVENous PRN    sodium chloride (NS) flush 5-40 mL  5-40 mL IntraVENous Q8H    sodium chloride (NS) flush 5-40 mL  5-40 mL IntraVENous PRN    acetaminophen (TYLENOL) tablet 650 mg  650 mg Oral Q6H PRN    Or    acetaminophen (TYLENOL) suppository 650 mg  650 mg Rectal Q6H PRN    polyethylene glycol (MIRALAX) packet 17 g  17 g Oral DAILY PRN    promethazine (PHENERGAN) tablet 12.5 mg  12.5 mg Oral Q6H PRN    Or    ondansetron (ZOFRAN) injection 4 mg  4 mg IntraVENous Q6H PRN    enoxaparin (LOVENOX) injection 40 mg  40 mg SubCUTAneous DAILY       Other Studies:  No results found for this visit on 08/10/20. No results found.     All Micro Results     None          SARS-CoV-2 Lab Results  \"Novel Coronavirus\" Test: No results found for: COV2NT   \"Emergent Disease\" Test: No results found for: EDPR  \"SARS-COV-2\" Test: No results found for: XGCOVT  \"Precision Labs\" Test: No results found for: RSLT  Rapid Test:   Lab Results   Component Value Date/Time    COVR Detected (A) 08/13/2020 08:52 AM            Assessment and Plan:     Hospital Problems as of 8/13/2020 Date Reviewed: 2/28/2017          Codes Class Noted - Resolved POA    COVID-19 virus infection ICD-10-CM: U07.1  ICD-9-CM: 079.89  8/10/2020 - Present Unknown        KAILEY (acute kidney injury) ([address] ICD-10-CM: N17.9  ICD-9-CM: 584.9  8/2/2020 - Present Yes        * (Principal) Acute metabolic encephalopathy VLV-41-GV: G93.41  ICD-9-CM: 348.31  8/2/2020 - Present Unknown        Alzheimer's dementia with behavioral disturbance (HCC) (Chronic) ICD-10-CM: G30.9, F02.81  ICD-9-CM: 331.0, 294.11  8/2/2020 - Present Yes        DNR (do not resuscitate) (Chronic) ICD-10-CM: L72  ICD-9-CM: V49.86  8/2/2020 - Present Yes        Diabetes mellitus, type II ([facility] [address] (Chronic) ICD-10-CM: E11.9  ICD-9-CM: 250.00  Unknown - Present Yes        Benign hypertension (Chronic) ICD-10-CM: I10  ICD-9-CM: 401.1  Unknown - Present Yes              Plan:  # Acute metabolic encephalopathy              - Head CT with chronic ischemic changes. - Appreciate Neuro eval -- likely delirium due to multitude of recent medical issues, no further work up needed. I will d/w patient's family. Also need to consider nutritional needs.     # KAILEY              - Resolved.      # HyperNa              - Resolved. Keep on Fluids.     # Recent COVID-19 infection              - On RA. Completed Decadron during and after prior hospital stay. Con't conservative management. Afebrile.      # HTN              - Hold home meds     # DM2              - SSI for now.      DC planning/Dispo: Back to facility when able.    Diet:  DIET NPO  DVT ppx: Lovenox    Signed:  Jean Pierre Wu MD

## 2020-08-13 NOTE — PROGRESS NOTES
Problem: Falls - Risk of  Goal: *Absence of Falls  Description: Document Ludin Lauren Fall Risk and appropriate interventions in the flowsheet. Outcome: Progressing Towards Goal  Note: Fall Risk Interventions:       Mentation Interventions: Adequate sleep, hydration, pain control, Bed/chair exit alarm    Medication Interventions: Bed/chair exit alarm    Elimination Interventions: Bed/chair exit alarm, Toileting schedule/hourly rounds              Problem: Patient Education: Go to Patient Education Activity  Goal: Patient/Family Education  Outcome: Progressing Towards Goal     Problem: Airway Clearance - Ineffective  Goal: Achieve or maintain patent airway  Outcome: Progressing Towards Goal     Problem: Gas Exchange - Impaired  Goal: Absence of hypoxia  Outcome: Progressing Towards Goal  Goal: Promote optimal lung function  Outcome: Progressing Towards Goal     Problem: Breathing Pattern - Ineffective  Goal: Ability to achieve and maintain a regular respiratory rate  Outcome: Progressing Towards Goal     Problem:  Body Temperature -  Risk of, Imbalanced  Goal: Ability to maintain a body temperature within defined limits  Outcome: Progressing Towards Goal  Goal: Will regain or maintain usual level of consciousness  Outcome: Progressing Towards Goal  Goal: Complications related to the disease process, condition or treatment will be avoided or minimized  Outcome: Progressing Towards Goal     Problem: Isolation Precautions - Risk of Spread of Infection  Goal: Prevent transmission of infectious organism to others  Outcome: Progressing Towards Goal     Problem: Nutrition Deficits  Goal: Optimize nutrtional status  Outcome: Progressing Towards Goal     Problem: Risk for Fluid Volume Deficit  Goal: Maintain normal heart rhythm  Outcome: Progressing Towards Goal  Goal: Maintain absence of muscle cramping  Outcome: Progressing Towards Goal  Goal: Maintain normal serum potassium, sodium, calcium, phosphorus, and pH  Outcome: Progressing Towards Goal     Problem: Nutrition Deficit  Goal: *Optimize nutritional status  Outcome: Not Progressing Towards Goal     Problem: Patient Education: Go to Patient Education Activity  Goal: Patient/Family Education  Outcome: Not Progressing Towards Goal     Problem: Pressure Injury - Risk of  Goal: *Prevention of pressure injury  Description: Document Christopher Scale and appropriate interventions in the flowsheet. Outcome: Progressing Towards Goal  Note: Pressure Injury Interventions:  Sensory Interventions: Assess changes in LOC, Turn and reposition approx.  every two hours (pillows and wedges if needed), Minimize linen layers, Avoid rigorous massage over bony prominences    Moisture Interventions: Check for incontinence Q2 hours and as needed    Activity Interventions: Assess need for specialty bed, Pressure redistribution bed/mattress(bed type)    Mobility Interventions: Pressure redistribution bed/mattress (bed type), HOB 30 degrees or less    Nutrition Interventions: Document food/fluid/supplement intake    Friction and Shear Interventions: Foam dressings/transparent film/skin sealants                Problem: Dysphagia (Adult)  Goal: *Speech Goal: (INSERT TEXT)  Outcome: Not Progressing Towards Goal     Problem: Patient Education: Go to Patient Education Activity  Goal: Patient/Family Education  Outcome: Not Progressing Towards Goal

## 2020-08-13 NOTE — INTERDISCIPLINARY ROUNDS
Interdisciplinary team rounds were held 8/13/2020 with the following team members:Care Management, Nursing and Physician. Plan of care discussed. See clinical pathway and/or care plan for interventions and desired outcomes.

## 2020-08-13 NOTE — PROGRESS NOTES
AM assessment completed. Pt alert, nonverbal. Resting in bed with eyes open, sitting up. Respirations even and unlabored. Pt is on room air. Abdomen soft and nontender. Bowel sounds active. Purewick intact with everardo urine draining. Incontinent brief in place. Pt appears comfortable. Heels floated on pillows. IVF infusing. No s/s of distress or pain. All safety measures in place.

## 2020-08-14 LAB
ANION GAP SERPL CALC-SCNC: 7 MMOL/L (ref 7–16)
BUN SERPL-MCNC: 11 MG/DL (ref 8–23)
CALCIUM SERPL-MCNC: 9.5 MG/DL (ref 8.3–10.4)
CHLORIDE SERPL-SCNC: 111 MMOL/L (ref 98–107)
CO2 SERPL-SCNC: 25 MMOL/L (ref 21–32)
COVID-19 RAPID TEST, COVR: DETECTED
CREAT SERPL-MCNC: 0.77 MG/DL (ref 0.6–1)
GLUCOSE BLD STRIP.AUTO-MCNC: 165 MG/DL (ref 65–100)
GLUCOSE BLD STRIP.AUTO-MCNC: 205 MG/DL (ref 65–100)
GLUCOSE BLD STRIP.AUTO-MCNC: 224 MG/DL (ref 65–100)
GLUCOSE BLD STRIP.AUTO-MCNC: 272 MG/DL (ref 65–100)
GLUCOSE SERPL-MCNC: 267 MG/DL (ref 65–100)
MAGNESIUM SERPL-MCNC: 1.9 MG/DL (ref 1.8–2.4)
PHOSPHATE SERPL-MCNC: 2.6 MG/DL (ref 2.3–3.7)
POTASSIUM SERPL-SCNC: 3.4 MMOL/L (ref 3.5–5.1)
SODIUM SERPL-SCNC: 143 MMOL/L (ref 136–145)
SOURCE, COVRS: ABNORMAL

## 2020-08-14 PROCEDURE — 80048 BASIC METABOLIC PNL TOTAL CA: CPT

## 2020-08-14 PROCEDURE — 82962 GLUCOSE BLOOD TEST: CPT

## 2020-08-14 PROCEDURE — 83735 ASSAY OF MAGNESIUM: CPT

## 2020-08-14 PROCEDURE — 74011636637 HC RX REV CODE- 636/637: Performed by: INTERNAL MEDICINE

## 2020-08-14 PROCEDURE — 74011000250 HC RX REV CODE- 250: Performed by: INTERNAL MEDICINE

## 2020-08-14 PROCEDURE — 74011250636 HC RX REV CODE- 250/636: Performed by: INTERNAL MEDICINE

## 2020-08-14 PROCEDURE — 65270000029 HC RM PRIVATE

## 2020-08-14 PROCEDURE — 36415 COLL VENOUS BLD VENIPUNCTURE: CPT

## 2020-08-14 PROCEDURE — 84100 ASSAY OF PHOSPHORUS: CPT

## 2020-08-14 PROCEDURE — 77030018798 HC PMP KT ENTRL FED COVD -A

## 2020-08-14 RX ORDER — POTASSIUM CHLORIDE 14.9 MG/ML
20 INJECTION INTRAVENOUS
Status: COMPLETED | OUTPATIENT
Start: 2020-08-14 | End: 2020-08-14

## 2020-08-14 RX ADMIN — Medication 10 ML: at 14:00

## 2020-08-14 RX ADMIN — Medication 10 ML: at 22:34

## 2020-08-14 RX ADMIN — FAMOTIDINE 20 MG: 10 INJECTION INTRAVENOUS at 10:10

## 2020-08-14 RX ADMIN — ENOXAPARIN SODIUM 40 MG: 40 INJECTION SUBCUTANEOUS at 10:10

## 2020-08-14 RX ADMIN — INSULIN LISPRO 4 UNITS: 100 INJECTION, SOLUTION INTRAVENOUS; SUBCUTANEOUS at 12:56

## 2020-08-14 RX ADMIN — INSULIN LISPRO 6 UNITS: 100 INJECTION, SOLUTION INTRAVENOUS; SUBCUTANEOUS at 08:09

## 2020-08-14 RX ADMIN — POTASSIUM CHLORIDE 20 MEQ: 200 INJECTION, SOLUTION INTRAVENOUS at 10:10

## 2020-08-14 RX ADMIN — Medication 10 ML: at 05:08

## 2020-08-14 RX ADMIN — FAMOTIDINE 20 MG: 10 INJECTION INTRAVENOUS at 21:13

## 2020-08-14 RX ADMIN — INSULIN LISPRO 4 UNITS: 100 INJECTION, SOLUTION INTRAVENOUS; SUBCUTANEOUS at 16:24

## 2020-08-14 RX ADMIN — POTASSIUM CHLORIDE 20 MEQ: 200 INJECTION, SOLUTION INTRAVENOUS at 12:10

## 2020-08-14 NOTE — PROGRESS NOTES
MSN, CM:  Patient continues to be NPO, NGT tube placed yesterday and tube feedings started. Patient is unstable for discharge at this time. Case Management will continue to follow.

## 2020-08-14 NOTE — PROGRESS NOTES
SPEECH PATHOLOGY NOTE:    Discussed with MD this morning. Patient with no functional improvements. NG now in place. Will hold therapy this weekend with plans to follow up on Monday.      [address]ree[address] CCC-SLP

## 2020-08-14 NOTE — INTERDISCIPLINARY ROUNDS
Interdisciplinary team rounds were held 8/14/2020 with the following team members:Care Management, Nursing and Physician. Plan of care discussed. See clinical pathway and/or care plan for interventions and desired outcomes.

## 2020-08-14 NOTE — PROGRESS NOTES
Problem: Falls - Risk of  Goal: *Absence of Falls  Description: Document Jovan Ross Fall Risk and appropriate interventions in the flowsheet.   Outcome: Progressing Towards Goal  Note: Fall Risk Interventions:       Mentation Interventions: Adequate sleep, hydration, pain control    Medication Interventions: Patient to call before getting OOB    Elimination Interventions: Call light in reach

## 2020-08-14 NOTE — PROGRESS NOTES
Comprehensive Nutrition Assessment    Type and Reason for Visit: Reassess(TF management (Hospitallist))    Nutrition Recommendations/Plan:   Continue current TF and water flush. Nutrition Assessment:  Patient with PMH of HTN, DM2, edema, HLD, dementia. She was recently discharged from [facility] after being treated for AMS from UTI. She presneted back with AMS and hypooxia. She has tested positive for COVID. Neuro following and AMS thought likely multifactoral.  KUB 8/13 confirmed placement and TF started. Patient now to goal rate and tolerating well per RN. RN reports no BM yet. Edema: Trace BLE  Abdomen: hypoactive BS, unknown last BM  Labs remarkable for: K+ 3.4 (replaced by MD), Glucose 267  Pertinent Medications: SSI (10 yesterday, 10 thus far today), Miralax PRN    Estimated Daily Nutrient Needs:  Energy (kcal):  4571-4571(00-44 kcal/kg (78.5 kg))  Protein (g):  79-98(20% kcal)       Fluid (ml/day):  0041-5543(~1 ml/kcal)    Nutrition Related Findings:  deferred due to isolation      Current Nutrition Therapies:   DIET NPO  DIET TUBE FEEDING Due not start TF until NGT placement confirmed. Current Tube Feeding (TF) Orders:   · Feeding Route: Nasogastric  · Formula: Glucerna 1.5  · Schedule:Continuous    · Regimen: 45 ml/hr  · Water Flushes: 35 ml/hr  · Current TF & Flush Orders Provides: 1620 kcal (100%% estimated calorie needs), 89 grams protein (100% estimated protein needs) and 1660ml free fluid (~1ml/kcal). Anthropometric Measures:  · Height:  5' 5\" (165.1 cm)  · Current Body Wt:  78.5 kg (173 lb 1 oz)(unknown weight source)   · Body mass index is 28.8 kg/m². · BMI Category: Overweight (BMI 25.0-29. 9)       Nutrition Diagnosis:   · Inadequate oral intake related to cognitive or neurological impairment as evidenced by (AMS, NPO per SLP)      Nutrition Interventions:   Food and/or Nutrient Delivery: Continue NPO, Continue tube feeding  Coordination of Nutrition Care: (Discussed with Canelo De La Rosa, RN)    Goals:  Maintain at least 75% nutrition needs with EN until able to take PO       Nutrition Monitoring and Evaluation:   Food/Nutrient Intake Outcomes: Food and nutrient intake    Discharge Planning:     Too soon to determine     [address] [place], LD on 8/14/2020 at 4:33 PM  Contact: [phone]

## 2020-08-14 NOTE — PROGRESS NOTES
Hospitalist Note     Admit Date:  8/10/2020  3:32 PM   Name:  Eleonora Vasquez   Age:  81 y.o.  :  1935   MRN:  [id number]   PCP:  Dia Aguilar MD  Treatment Team: Attending Provider: Lavern Vaughn MD; Primary Nurse: Dio Felton; Utilization Review: Malina Agee RN; Care Manager: Bailey West RN; Consulting Provider: Myrna Canchola; Speech Language Pathologist: Dee Dee Quiroz, DREW; Physical Therapist: Temo Segura DPT; Staff Nurse: Joe Sullivan RN    HPI/Subjective:   Mrs. Jesus Martinez is an 82 y/o AAF with a h/o DM, HTN who was recently hospitalized from - for UTI and metabolic encephalopathy. Tested for COVID due to placement and was positive. Weaned from 2L to RA. DC to [facility] on . Was reportedly tolerating diet and following commands. Brought back on 8/10 with encephalopathy, O2 sats with EMS 86% (but no hypoxia since admission). Remained non-verbal, no respiratory issues. Neurology feel encephalopathy due to persistent delirium. Started on TFs via NGT on .    : D/w Letty and Charis Russo yesterday. Helio Domingo would like to try NGT with TFs for nutritional support. We discussed Neuro's evaluation. She looks the same today, opens eyes to voice but doesn't speak or follow any commands. No other complaints  Objective:     Patient Vitals for the past 24 hrs:   Temp Pulse Resp BP SpO2   20 0349 97.7 °F (36.5 °C) 74 14 152/72 95 %   20 2310 98.2 °F (36.8 °C) 73 16 148/71 98 %   20 97.8 °F (36.6 °C) 79 14 152/72 100 %   20 1516 (!) 96.6 °F (35.9 °C) 83 16 (!) 128/94 96 %   20 1103 96.9 °F (36.1 °C) 73 18 126/73 97 %     Oxygen Therapy  O2 Sat (%): 95 % (20 0349)  Pulse via Oximetry: 53 beats per minute (08/10/20 1844)  O2 Device: Room air (20 0705)    Estimated body mass index is 28.8 kg/m² as calculated from the following:    Height as of this encounter: 5' 5\" (1.651 m).     Weight as of 20: 78.5 kg (173 lb 1 oz).      Intake/Output Summary (Last 24 hours) at 8/14/2020 0848  Last data filed at 8/13/2020 1616  Gross per 24 hour   Intake    Output 250 ml   Net -250 ml       *Note that automatically entered I/Os may not be accurate; dependent on patient compliance with collection and accurate  by techs. General:    Chronically ill appearing. Non-verbal.  CV:   RRR. No murmur, rub, or gallop. Lungs:   CTAB. No wheezing, rhonchi, or rales. Abdomen:   Soft, nontender, nondistended. NGT right nare, TFs infusing. Extremities: Warm and dry. No cyanosis or edema. Skin:     No rashes or jaundice. Neuro:  Opens eyes. Does not speak or follow commands.     Data Reviewed:  I have reviewed all labs, meds, and studies from the last 24 hours:  Recent Results (from the past 24 hour(s))   SARS-COV-2    Collection Time: 08/13/20  8:52 AM   Result Value Ref Range    Specimen source Nasopharyngeal      COVID-19 rapid test Detected (A) NOTD     GLUCOSE, POC    Collection Time: 08/13/20 11:09 AM   Result Value Ref Range    Glucose (POC) 214 (H) 65 - 100 mg/dL   GLUCOSE, POC    Collection Time: 08/13/20  4:18 PM   Result Value Ref Range    Glucose (POC) 177 (H) 65 - 100 mg/dL   GLUCOSE, POC    Collection Time: 08/13/20  9:06 PM   Result Value Ref Range    Glucose (POC) 162 (H) 65 - 709 mg/dL   METABOLIC PANEL, BASIC    Collection Time: 08/14/20  6:48 AM   Result Value Ref Range    Sodium 143 136 - 145 mmol/L    Potassium 3.4 (L) 3.5 - 5.1 mmol/L    Chloride 111 (H) 98 - 107 mmol/L    CO2 25 21 - 32 mmol/L    Anion gap 7 7 - 16 mmol/L    Glucose 267 (H) 65 - 100 mg/dL    BUN 11 8 - 23 MG/DL    Creatinine 0.77 0.6 - 1.0 MG/DL    GFR est AA >60 >60 ml/min/1.73m2    GFR est non-AA >60 >60 ml/min/1.73m2    Calcium 9.5 8.3 - 10.4 MG/DL   PHOSPHORUS    Collection Time: 08/14/20  6:48 AM   Result Value Ref Range    Phosphorus 2.6 2.3 - 3.7 MG/DL   MAGNESIUM    Collection Time: 08/14/20  6:48 AM   Result Value Ref Range Magnesium 1.9 1.8 - 2.4 mg/dL   GLUCOSE, POC    Collection Time: 08/14/20  7:33 AM   Result Value Ref Range    Glucose (POC) 272 (H) 65 - 100 mg/dL        Current Meds:  Current Facility-Administered Medications   Medication Dose Route Frequency    potassium chloride 20 mEq in 100 ml IVPB  20 mEq IntraVENous Q2H    famotidine (PF) (PEPCID) 20 mg in 0.9% sodium chloride 10 mL injection  20 mg IntraVENous Q12H    lip protectant (BLISTEX) ointment 1 Each  1 Each Topical PRN    NUTRITIONAL SUPPORT ELECTROLYTE PRN ORDERS   Does Not Apply PRN    insulin lispro (HUMALOG) injection   SubCUTAneous AC&HS    dextrose 40% (GLUTOSE) oral gel 1 Tube  15 g Oral PRN    glucagon (GLUCAGEN) injection 1 mg  1 mg IntraMUSCular PRN    dextrose (D50W) injection syrg 12.5-25 g  25-50 mL IntraVENous PRN    sodium chloride (NS) flush 5-40 mL  5-40 mL IntraVENous Q8H    sodium chloride (NS) flush 5-40 mL  5-40 mL IntraVENous PRN    acetaminophen (TYLENOL) tablet 650 mg  650 mg Oral Q6H PRN    Or    acetaminophen (TYLENOL) suppository 650 mg  650 mg Rectal Q6H PRN    polyethylene glycol (MIRALAX) packet 17 g  17 g Oral DAILY PRN    promethazine (PHENERGAN) tablet 12.5 mg  12.5 mg Oral Q6H PRN    Or    ondansetron (ZOFRAN) injection 4 mg  4 mg IntraVENous Q6H PRN    enoxaparin (LOVENOX) injection 40 mg  40 mg SubCUTAneous DAILY       Other Studies:  No results found for this visit on 08/10/20. Xr Abd (kub)    Result Date: 8/13/2020  Exam: Single view abdomen. Indication: NG tube placement. Comparison: None. FINDINGS: Enteric tube with side-port and tip in the gastric body. Splenic artery calcifications are evident. Sternal wires are partially imaged. IMPRESSION: 1. Enteric tube side-port and tip in the gastric body.       All Micro Results     None          SARS-CoV-2 Lab Results  \"Novel Coronavirus\" Test: No results found for: COV2NT   \"Emergent Disease\" Test: No results found for: EDPR  \"SARS-COV-2\" Test: No results found for: XGCOVT  \"Precision Labs\" Test: No results found for: RSLT  Rapid Test:   Lab Results   Component Value Date/Time    COVR Detected (A) 08/13/2020 08:52 AM            Assessment and Plan:     Hospital Problems as of 8/14/2020 Date Reviewed: 2/28/2017          Codes Class Noted - Resolved POA    COVID-19 virus infection ICD-10-CM: U07.1  ICD-9-CM: 079.89  8/10/2020 - Present Unknown        KAILEY (acute kidney injury) ([facility] ICD-10-CM: N17.9  ICD-9-CM: 584.9  8/2/2020 - Present Yes        * (Principal) Acute metabolic encephalopathy UVT-22-SK: G93.41  ICD-9-CM: 348.31  8/2/2020 - Present Unknown        Alzheimer's dementia with behavioral disturbance ([facility] (Chronic) ICD-10-CM: G30.9, F02.81  ICD-9-CM: 331.0, 294.11  8/2/2020 - Present Yes        DNR (do not resuscitate) (Chronic) ICD-10-CM: A55  ICD-9-CM: V49.86  8/2/2020 - Present Yes        Diabetes mellitus, type II ([facility] (Chronic) ICD-10-CM: E11.9  ICD-9-CM: 250.00  Unknown - Present Yes        Benign hypertension (Chronic) ICD-10-CM: I10  ICD-9-CM: 401.1  Unknown - Present Yes              Plan:  # Acute metabolic encephalopathy              - EURX CT with chronic ischemic changes. - Appreciate Neuro eval -- likely delirium due to multitude of recent medical issues and if she recovers it could take a few more weeks, no further work up needed. - Discussed with family on 8/13. NGT and TFs started. If no improvement with nutritional support or any further decline noted then I explained we would need to discuss the possibility of hospice. Denise Wilson is realistic but would like to try TFs and see. # HypoK   - Replace IV today. # KAILEY              - EIJQJNRN.      # HyperNa              - Resolved. Off IVFs.    # Recent COVID-19 infection              - On RA. Completed Decadron during and after prior hospital stay. Con't conservative management.  Afebrile.      # HTN              - Hold home meds     # DM2              - SSI for now.     DC planning/Dispo: Unclear. Pending clinical course. Diet:  DIET NPO  DIET TUBE FEEDING  DVT ppx: Lovenox.     Signed:  Haley Sosa MD

## 2020-08-15 LAB
ANION GAP SERPL CALC-SCNC: 5 MMOL/L (ref 7–16)
BUN SERPL-MCNC: 12 MG/DL (ref 8–23)
CALCIUM SERPL-MCNC: 9.3 MG/DL (ref 8.3–10.4)
CHLORIDE SERPL-SCNC: 111 MMOL/L (ref 98–107)
CO2 SERPL-SCNC: 27 MMOL/L (ref 21–32)
CREAT SERPL-MCNC: 0.78 MG/DL (ref 0.6–1)
GLUCOSE BLD STRIP.AUTO-MCNC: 184 MG/DL (ref 65–100)
GLUCOSE BLD STRIP.AUTO-MCNC: 204 MG/DL (ref 65–100)
GLUCOSE BLD STRIP.AUTO-MCNC: 283 MG/DL (ref 65–100)
GLUCOSE BLD STRIP.AUTO-MCNC: 300 MG/DL (ref 65–100)
GLUCOSE SERPL-MCNC: 281 MG/DL (ref 65–100)
POTASSIUM SERPL-SCNC: 3.8 MMOL/L (ref 3.5–5.1)
SODIUM SERPL-SCNC: 143 MMOL/L (ref 136–145)

## 2020-08-15 PROCEDURE — 74011000250 HC RX REV CODE- 250: Performed by: INTERNAL MEDICINE

## 2020-08-15 PROCEDURE — 65270000029 HC RM PRIVATE

## 2020-08-15 PROCEDURE — 74011636637 HC RX REV CODE- 636/637: Performed by: INTERNAL MEDICINE

## 2020-08-15 PROCEDURE — 36415 COLL VENOUS BLD VENIPUNCTURE: CPT

## 2020-08-15 PROCEDURE — 82962 GLUCOSE BLOOD TEST: CPT

## 2020-08-15 PROCEDURE — 74011250636 HC RX REV CODE- 250/636: Performed by: INTERNAL MEDICINE

## 2020-08-15 PROCEDURE — 80048 BASIC METABOLIC PNL TOTAL CA: CPT

## 2020-08-15 RX ORDER — POTASSIUM CHLORIDE 14.9 MG/ML
20 INJECTION INTRAVENOUS
Status: COMPLETED | OUTPATIENT
Start: 2020-08-15 | End: 2020-08-15

## 2020-08-15 RX ADMIN — POTASSIUM CHLORIDE 20 MEQ: 200 INJECTION, SOLUTION INTRAVENOUS at 08:11

## 2020-08-15 RX ADMIN — INSULIN LISPRO 4 UNITS: 100 INJECTION, SOLUTION INTRAVENOUS; SUBCUTANEOUS at 16:57

## 2020-08-15 RX ADMIN — Medication 10 ML: at 05:41

## 2020-08-15 RX ADMIN — FAMOTIDINE 20 MG: 10 INJECTION INTRAVENOUS at 08:11

## 2020-08-15 RX ADMIN — Medication 10 ML: at 22:00

## 2020-08-15 RX ADMIN — INSULIN LISPRO 6 UNITS: 100 INJECTION, SOLUTION INTRAVENOUS; SUBCUTANEOUS at 11:51

## 2020-08-15 RX ADMIN — INSULIN LISPRO 8 UNITS: 100 INJECTION, SOLUTION INTRAVENOUS; SUBCUTANEOUS at 08:52

## 2020-08-15 RX ADMIN — Medication 10 ML: at 14:21

## 2020-08-15 RX ADMIN — FAMOTIDINE 20 MG: 10 INJECTION INTRAVENOUS at 22:43

## 2020-08-15 RX ADMIN — ENOXAPARIN SODIUM 40 MG: 40 INJECTION SUBCUTANEOUS at 08:11

## 2020-08-15 RX ADMIN — POTASSIUM CHLORIDE 20 MEQ: 200 INJECTION, SOLUTION INTRAVENOUS at 10:33

## 2020-08-15 NOTE — PROGRESS NOTES
Pt. Resting in bed and no s/s of pain or distress. RA. NG with TF and aspiration precautions in place. IV KCL ordered. Pt. Is aphasic and opens eyes to stimuli and voice.

## 2020-08-15 NOTE — PROGRESS NOTES
Hospitalist Note     Admit Date:  8/10/2020  3:32 PM   Name:  Joaquina Live   Age:  81 y.o.  :  1935   MRN:  [id number]   PCP:  Vini Lynn MD  Treatment Team: Attending Provider: Teddy Ace MD; Primary Nurse: Mk Crauso; Utilization Review: Lore Olivia, RN; Care Manager: Gabino Mckeon RN; Consulting Provider: Eduarda Galaviz DO    HPI/Subjective:   Mrs. Imelda Bernal is an 80 y/o AAF with a h/o DM, HTN who was recently hospitalized from - for UTI and metabolic encephalopathy. Tested for COVID due to placement and was positive. Weaned from 2L to RA. DC to [facility] on . Was reportedly tolerating diet and following commands. Brought back on 8/10 with encephalopathy, O2 sats with EMS 86% (but no hypoxia since admission). Remained non-verbal, no respiratory issues. Neurology feel encephalopathy due to persistent delirium. Started on TFs via NGT on .    8/15: No changes today. Blank stare, not following commands. TFs going. K low. Unable to get ROS. Spoke to daughter, Peace Roman, this morning. Objective:     Patient Vitals for the past 24 hrs:   Temp Pulse Resp BP SpO2   08/15/20 0830 98.3 °F (36.8 °C) 73 18 117/75 96 %   08/15/20 0520 98.5 °F (36.9 °C) 78 16 139/90 98 %   08/15/20 0000 98.3 °F (36.8 °C) 75 18 116/56 96 %   20 98.5 °F (36.9 °C) 71 18 153/79 98 %   20 1619 97.7 °F (36.5 °C) 78 18 (!) 135/105 98 %   20 1218 98.2 °F (36.8 °C) 72 15 110/50 100 %     Oxygen Therapy  O2 Sat (%): 96 % (08/15/20 0830)  Pulse via Oximetry: 53 beats per minute (08/10/20 1844)  O2 Device: Room air (08/15/20 0825)    Estimated body mass index is 28.8 kg/m² as calculated from the following:    Height as of this encounter: 5' 5\" (1.651 m). Weight as of 20: 78.5 kg (173 lb 1 oz).     No intake or output data in the 24 hours ending 08/15/20 0857    *Note that automatically entered I/Os may not be accurate; dependent on patient compliance with collection and accurate  by [employer]. General:    Chronically ill appearing. Non-verbal.  CV:   RRR. No murmur, rub, or gallop. Lungs:   CTAB. No wheezing, rhonchi, or rales. Abdomen:   Soft, nontender, nondistended. Extremities: Warm and dry. No cyanosis or edema. Skin:     No rashes or jaundice. Neuro:  Blank stare, non-verbal. Moves independently in bed but does no follow commands.     Data Reviewed:  I have reviewed all labs, meds, and studies from the last 24 hours:  Recent Results (from the past 24 hour(s))   GLUCOSE, POC    Collection Time: 08/14/20 12:17 PM   Result Value Ref Range    Glucose (POC) 224 (H) 65 - 100 mg/dL   GLUCOSE, POC    Collection Time: 08/14/20  4:19 PM   Result Value Ref Range    Glucose (POC) 205 (H) 65 - 100 mg/dL   GLUCOSE, POC    Collection Time: 08/14/20  9:22 PM   Result Value Ref Range    Glucose (POC) 165 (H) 65 - 512 mg/dL   METABOLIC PANEL, BASIC    Collection Time: 08/15/20  6:13 AM   Result Value Ref Range    Sodium 143 136 - 145 mmol/L    Potassium 3.8 3.5 - 5.1 mmol/L    Chloride 111 (H) 98 - 107 mmol/L    CO2 27 21 - 32 mmol/L    Anion gap 5 (L) 7 - 16 mmol/L    Glucose 281 (H) 65 - 100 mg/dL    BUN 12 8 - 23 MG/DL    Creatinine 0.78 0.6 - 1.0 MG/DL    GFR est AA >60 >60 ml/min/1.73m2    GFR est non-AA >60 >60 ml/min/1.73m2    Calcium 9.3 8.3 - 10.4 MG/DL   GLUCOSE, POC    Collection Time: 08/15/20  8:32 AM   Result Value Ref Range    Glucose (POC) 300 (H) 65 - 100 mg/dL        Current Meds:  Current Facility-Administered Medications   Medication Dose Route Frequency    potassium chloride 20 mEq in 100 ml IVPB  20 mEq IntraVENous Q2H    famotidine (PF) (PEPCID) 20 mg in 0.9% sodium chloride 10 mL injection  20 mg IntraVENous Q12H    lip protectant (BLISTEX) ointment 1 Each  1 Each Topical PRN    NUTRITIONAL SUPPORT ELECTROLYTE PRN ORDERS   Does Not Apply PRN    insulin lispro (HUMALOG) injection   SubCUTAneous AC&HS    dextrose 40% (GLUTOSE) oral gel 1 Tube  15 g Oral PRN    glucagon (GLUCAGEN) injection 1 mg  1 mg IntraMUSCular PRN    dextrose (D50W) injection syrg 12.5-25 g  25-50 mL IntraVENous PRN    sodium chloride (NS) flush 5-40 mL  5-40 mL IntraVENous Q8H    sodium chloride (NS) flush 5-40 mL  5-40 mL IntraVENous PRN    acetaminophen (TYLENOL) tablet 650 mg  650 mg Oral Q6H PRN    Or    acetaminophen (TYLENOL) suppository 650 mg  650 mg Rectal Q6H PRN    polyethylene glycol (MIRALAX) packet 17 g  17 g Oral DAILY PRN    promethazine (PHENERGAN) tablet 12.5 mg  12.5 mg Oral Q6H PRN    Or    ondansetron (ZOFRAN) injection 4 mg  4 mg IntraVENous Q6H PRN    enoxaparin (LOVENOX) injection 40 mg  40 mg SubCUTAneous DAILY       Other Studies:  No results found for this visit on 08/10/20. No results found.     All Micro Results     None          SARS-CoV-2 Lab Results  \"Novel Coronavirus\" Test: No results found for: COV2NT   \"Emergent Disease\" Test: No results found for: EDPR  \"SARS-COV-2\" Test: No results found for: XGCOVT  \"Precision Labs\" Test: No results found for: RSLT  Rapid Test:   Lab Results   Component Value Date/Time    COVR Detected (A) 08/13/2020 08:52 AM            Assessment and Plan:     Hospital Problems as of 8/15/2020 Date Reviewed: 2/28/2017          Codes Class Noted - Resolved POA    COVID-19 virus infection ICD-10-CM: U07.1  ICD-9-CM: 079.89  8/10/2020 - Present Unknown        KAILEY (acute kidney injury) ([facility] ICD-10-CM: N17.9  ICD-9-CM: 584.9  8/2/2020 - Present Yes        * (Principal) Acute metabolic encephalopathy DXF-[id number]: G93.41  ICD-9-CM: 348.31  8/2/2020 - Present Unknown        Alzheimer's dementia with behavioral disturbance ([facility] (Chronic) ICD-10-CM: G30.9, F02.81  ICD-9-CM: 331.0, 294.11  8/2/2020 - Present Yes        DNR (do not resuscitate) (Chronic) ICD-10-CM: G10  ICD-9-CM: V49.86  8/2/2020 - Present Yes        Diabetes mellitus, type II ([facility] (Chronic) ICD-10-CM: E11.9  ICD-9-CM: 250.00  Unknown - Present Yes        Benign hypertension (Chronic) ICD-10-CM: I10  ICD-9-CM: 401.1  Unknown - Present Yes              Plan:  # Acute metabolic encephalopathy              - CIIC CT with chronic ischemic changes.             - Appreciate Neuro eval -- likely delirium due to multitude of recent medical issues; if she recovers it could take a few more weeks, no further work up needed. - Discussed with Yesi Hoffmann and started NGT with TFs on 8/13. No significant change since then. She's realistic. Strongly considering hospice, but unsure if she would want that back at [facility], home with family or hospice house somewhere. # HypoK              - Resolved.     # KAILEY              - OEQCKVBK.      # HyperNa              - PBENJOVJ. Off IVFs.    # Recent COVID-19 infection              - On RA. Completed Decadron during and after prior hospital stay. Con't conservative management. Afebrile.      # HTN              - Hold home meds     # DM2              - SSI for now.     DC planning/Dispo: Possibly Hospice. Family to discuss.   Diet:  DIET NPO  DIET TUBE FEEDING  DVT ppx: Lovenox    Signed:  Jaden Jimenez MD

## 2020-08-16 LAB
GLUCOSE BLD STRIP.AUTO-MCNC: 141 MG/DL (ref 65–100)
GLUCOSE BLD STRIP.AUTO-MCNC: 185 MG/DL (ref 65–100)
GLUCOSE BLD STRIP.AUTO-MCNC: 289 MG/DL (ref 65–100)
GLUCOSE BLD STRIP.AUTO-MCNC: 348 MG/DL (ref 65–100)

## 2020-08-16 PROCEDURE — 65270000029 HC RM PRIVATE

## 2020-08-16 PROCEDURE — 74011000250 HC RX REV CODE- 250: Performed by: INTERNAL MEDICINE

## 2020-08-16 PROCEDURE — 82962 GLUCOSE BLOOD TEST: CPT

## 2020-08-16 PROCEDURE — 74011250636 HC RX REV CODE- 250/636: Performed by: INTERNAL MEDICINE

## 2020-08-16 PROCEDURE — 74011636637 HC RX REV CODE- 636/637: Performed by: INTERNAL MEDICINE

## 2020-08-16 RX ADMIN — FAMOTIDINE 20 MG: 10 INJECTION INTRAVENOUS at 20:42

## 2020-08-16 RX ADMIN — INSULIN LISPRO 6 UNITS: 100 INJECTION, SOLUTION INTRAVENOUS; SUBCUTANEOUS at 11:35

## 2020-08-16 RX ADMIN — Medication 10 ML: at 14:00

## 2020-08-16 RX ADMIN — INSULIN LISPRO 2 UNITS: 100 INJECTION, SOLUTION INTRAVENOUS; SUBCUTANEOUS at 22:24

## 2020-08-16 RX ADMIN — INSULIN LISPRO 8 UNITS: 100 INJECTION, SOLUTION INTRAVENOUS; SUBCUTANEOUS at 08:23

## 2020-08-16 RX ADMIN — ENOXAPARIN SODIUM 40 MG: 40 INJECTION SUBCUTANEOUS at 08:23

## 2020-08-16 RX ADMIN — FAMOTIDINE 20 MG: 10 INJECTION INTRAVENOUS at 08:23

## 2020-08-16 RX ADMIN — Medication 10 ML: at 06:18

## 2020-08-16 RX ADMIN — Medication 5 ML: at 22:25

## 2020-08-16 NOTE — PROGRESS NOTES
Hospitalist Note     Admit Date:  8/10/2020  3:32 PM   Name:  Justo Jade   Age:  81 y.o.  :  1935   MRN:  [id number]   PCP:  Janeth Torres MD  Treatment Team: Attending Provider: Deya Milan MD; Utilization Review: Yohana Palomino, FARRAH; Care Manager: Hai Joseph, FARRAH; Consulting Provider: Dav Renee DO    HPI/Subjective:   Mrs. [employer] is an 80 y/o AAF with a h/o DM, HTN who was recently hospitalized from - for UTI and metabolic encephalopathy. Tested for COVID due to placement and was positive. Weaned from 2L to RA. DC to [facility] on . Was reportedly tolerating diet and following commands. Brought back on 8/10 with encephalopathy, O2 sats with EMS 86% (but no hypoxia since admission). Remained non-verbal, no respiratory issues.  Neurology feel encephalopathy due to persistent delirium. Started on TFs via NGT on .    : No change, clinically the same. Blank stare, non-verbal, not following commands. No other complaints  Objective:     Patient Vitals for the past 24 hrs:   Temp Pulse Resp BP SpO2   20 0720 98.2 °F (36.8 °C) 78 16 132/61 95 %   20 0506 99 °F (37.2 °C) 80 16 139/70 98 %   20 0131 99.4 °F (37.4 °C) 76 16 140/88 97 %   08/15/20 2124 98 °F (36.7 °C) 78 16 160/72 96 %   08/15/20 1639 98.7 °F (37.1 °C) 72 16 108/44 95 %   08/15/20 1150 97.8 °F (36.6 °C) 72 16 120/64 96 %     Oxygen Therapy  O2 Sat (%): 95 % (20 0720)  Pulse via Oximetry: 53 beats per minute (08/10/20 1844)  O2 Device: Room air (20 0825)    Estimated body mass index is 28.8 kg/m² as calculated from the following:    Height as of this encounter: 5' 5\" (1.651 m). Weight as of 20: 78.5 kg (173 lb 1 oz).       Intake/Output Summary (Last 24 hours) at 2020 0936  Last data filed at 2020 0825  Gross per 24 hour   Intake 30 ml   Output 700 ml   Net -670 ml       *Note that automatically entered I/Os may not be accurate; dependent on patient compliance with collection and accurate  by techs. General:    Ill appearing, non-verbal. No eye contact. CV:   RRR. No murmur, rub, or gallop. Lungs:   CTAB. No wheezing, rhonchi, or rales. Abdomen:   Soft, nontender, nondistended. NGT right nare. Extremities: Warm and dry. No cyanosis or edema. Skin:     No rashes or jaundice. Neuro:  As above. Doesn't follow commands.     Data Reviewed:  I have reviewed all labs, meds, and studies from the last 24 hours:  Recent Results (from the past 24 hour(s))   GLUCOSE, POC    Collection Time: 08/15/20 11:40 AM   Result Value Ref Range    Glucose (POC) 283 (H) 65 - 100 mg/dL   GLUCOSE, POC    Collection Time: 08/15/20  4:43 PM   Result Value Ref Range    Glucose (POC) 204 (H) 65 - 100 mg/dL   GLUCOSE, POC    Collection Time: 08/15/20  9:32 PM   Result Value Ref Range    Glucose (POC) 184 (H) 65 - 100 mg/dL   GLUCOSE, POC    Collection Time: 08/16/20  7:32 AM   Result Value Ref Range    Glucose (POC) 348 (H) 65 - 100 mg/dL        Current Meds:  Current Facility-Administered Medications   Medication Dose Route Frequency    famotidine (PF) (PEPCID) 20 mg in 0.9% sodium chloride 10 mL injection  20 mg IntraVENous Q12H    lip protectant (BLISTEX) ointment 1 Each  1 Each Topical PRN    NUTRITIONAL SUPPORT ELECTROLYTE PRN ORDERS   Does Not Apply PRN    insulin lispro (HUMALOG) injection   SubCUTAneous AC&HS    dextrose 40% (GLUTOSE) oral gel 1 Tube  15 g Oral PRN    glucagon (GLUCAGEN) injection 1 mg  1 mg IntraMUSCular PRN    dextrose (D50W) injection syrg 12.5-25 g  25-50 mL IntraVENous PRN    sodium chloride (NS) flush 5-40 mL  5-40 mL IntraVENous Q8H    sodium chloride (NS) flush 5-40 mL  5-40 mL IntraVENous PRN    acetaminophen (TYLENOL) tablet 650 mg  650 mg Oral Q6H PRN    Or    acetaminophen (TYLENOL) suppository 650 mg  650 mg Rectal Q6H PRN    polyethylene glycol (MIRALAX) packet 17 g  17 g Oral DAILY PRN    promethazine (PHENERGAN) tablet 12.5 mg  12.5 mg Oral Q6H PRN    Or    ondansetron (ZOFRAN) injection 4 mg  4 mg IntraVENous Q6H PRN    enoxaparin (LOVENOX) injection 40 mg  40 mg SubCUTAneous DAILY       Other Studies:  No results found for this visit on 08/10/20. No results found. All Micro Results     None          SARS-CoV-2 Lab Results  \"Novel Coronavirus\" Test: No results found for: COV2NT   \"Emergent Disease\" Test: No results found for: EDPR  \"SARS-COV-2\" Test: No results found for: XGCOVT  \"Precision Labs\" Test: No results found for: RSLT  Rapid Test:   Lab Results   Component Value Date/Time    COVR Detected (A) 08/13/2020 08:52 AM            Assessment and Plan:     Hospital Problems as of 8/16/2020 Date Reviewed: 2/28/2017          Codes Class Noted - Resolved POA    COVID-19 virus infection ICD-10-CM: U07.1  ICD-9-CM: 079.89  8/10/2020 - Present Unknown        KAILEY (acute kidney injury) ([facility] ICD-10-CM: N17.9  ICD-9-CM: 584.9  8/2/2020 - Present Yes        * (Principal) Acute metabolic encephalopathy HQU-[id number]: G93.41  ICD-9-CM: 348.31  8/2/2020 - Present Unknown        Alzheimer's dementia with behavioral disturbance ([facility] (Chronic) ICD-10-CM: G30.9, F02.81  ICD-9-CM: 331.0, 294.11  8/2/2020 - Present Yes        DNR (do not resuscitate) (Chronic) ICD-10-CM: X69  ICD-9-CM: V49.86  8/2/2020 - Present Yes        Diabetes mellitus, type II ([facility] (Chronic) ICD-10-CM: E11.9  ICD-9-CM: 250.00  Unknown - Present Yes        Benign hypertension (Chronic) ICD-10-CM: I10  ICD-9-CM: 401.1  Unknown - Present Yes              Plan:  # Acute metabolic encephalopathy              - \[facility] [place] [facility]\"" CT with chronic ischemic changes.             - Appreciate Neuro eval -- likely delirium due to multitude of recent medical issues; if she recovers it could take a few more weeks, no further work up needed.              - Discussed with Josh Hanna and started NGT with TFs on 8/13. No clinical change and she is aware of this.  Possibly Hospice.      # HypoK              - Resolved.     # KAILEY              - YECPCQHY.      # HyperNa              - [id number]. Off IVFs.    # Recent COVID-19 infection              - On RA. Completed Decadron during and after prior hospital stay. Con't conservative management. Afebrile.      # HTN              - Hold home meds     # DM2              - SSI for now.     DC planning/Dispo: Possibly Hospice. Family hopeful to make a decision tomorrow.   Diet:  DIET NPO  DIET TUBE FEEDING  DVT ppx: Lovenox    Signed:  Judy Brito MD

## 2020-08-17 LAB
ANION GAP SERPL CALC-SCNC: 5 MMOL/L (ref 7–16)
BUN SERPL-MCNC: 17 MG/DL (ref 8–23)
CALCIUM SERPL-MCNC: 9.6 MG/DL (ref 8.3–10.4)
CHLORIDE SERPL-SCNC: 108 MMOL/L (ref 98–107)
CO2 SERPL-SCNC: 29 MMOL/L (ref 21–32)
CREAT SERPL-MCNC: 0.89 MG/DL (ref 0.6–1)
GLUCOSE BLD STRIP.AUTO-MCNC: 151 MG/DL (ref 65–100)
GLUCOSE BLD STRIP.AUTO-MCNC: 181 MG/DL (ref 65–100)
GLUCOSE BLD STRIP.AUTO-MCNC: 288 MG/DL (ref 65–100)
GLUCOSE BLD STRIP.AUTO-MCNC: 354 MG/DL (ref 65–100)
GLUCOSE SERPL-MCNC: 232 MG/DL (ref 65–100)
MAGNESIUM SERPL-MCNC: 2 MG/DL (ref 1.8–2.4)
PHOSPHATE SERPL-MCNC: 2.2 MG/DL (ref 2.3–3.7)
POTASSIUM SERPL-SCNC: 4.5 MMOL/L (ref 3.5–5.1)
SODIUM SERPL-SCNC: 142 MMOL/L (ref 136–145)

## 2020-08-17 PROCEDURE — 77030038269 HC DRN EXT URIN PURWCK BARD -A

## 2020-08-17 PROCEDURE — 74011250636 HC RX REV CODE- 250/636: Performed by: INTERNAL MEDICINE

## 2020-08-17 PROCEDURE — 83735 ASSAY OF MAGNESIUM: CPT

## 2020-08-17 PROCEDURE — 77030040393 HC DRSG OPTIFOAM GENT MDII -B

## 2020-08-17 PROCEDURE — 82962 GLUCOSE BLOOD TEST: CPT

## 2020-08-17 PROCEDURE — 65270000029 HC RM PRIVATE

## 2020-08-17 PROCEDURE — 80048 BASIC METABOLIC PNL TOTAL CA: CPT

## 2020-08-17 PROCEDURE — 74011636637 HC RX REV CODE- 636/637: Performed by: INTERNAL MEDICINE

## 2020-08-17 PROCEDURE — 84100 ASSAY OF PHOSPHORUS: CPT

## 2020-08-17 PROCEDURE — 74011000250 HC RX REV CODE- 250: Performed by: INTERNAL MEDICINE

## 2020-08-17 RX ADMIN — Medication 10 ML: at 14:00

## 2020-08-17 RX ADMIN — Medication 5 ML: at 06:25

## 2020-08-17 RX ADMIN — INSULIN LISPRO 2 UNITS: 100 INJECTION, SOLUTION INTRAVENOUS; SUBCUTANEOUS at 16:47

## 2020-08-17 RX ADMIN — FAMOTIDINE 20 MG: 10 INJECTION INTRAVENOUS at 08:14

## 2020-08-17 RX ADMIN — ENOXAPARIN SODIUM 40 MG: 40 INJECTION SUBCUTANEOUS at 08:14

## 2020-08-17 RX ADMIN — Medication 10 ML: at 22:00

## 2020-08-17 RX ADMIN — INSULIN LISPRO 10 UNITS: 100 INJECTION, SOLUTION INTRAVENOUS; SUBCUTANEOUS at 08:14

## 2020-08-17 RX ADMIN — INSULIN LISPRO 6 UNITS: 100 INJECTION, SOLUTION INTRAVENOUS; SUBCUTANEOUS at 11:58

## 2020-08-17 NOTE — PROGRESS NOTES
Problem: Falls - Risk of  Goal: *Absence of Falls  Description: Document [place] Fall Risk and appropriate interventions in the flowsheet. Outcome: Progressing Towards Goal  Note: Fall Risk Interventions:  Mobility Interventions: Bed/chair exit alarm    Mentation Interventions: Adequate sleep, hydration, pain control    Medication Interventions: Bed/chair exit alarm    Elimination Interventions: Call light in reach              Problem: Airway Clearance - Ineffective  Goal: Achieve or maintain patent airway  Outcome: Progressing Towards Goal     Problem: Gas Exchange - Impaired  Goal: Absence of hypoxia  Outcome: Progressing Towards Goal  Goal: Promote optimal lung function  Outcome: Progressing Towards Goal     Problem: Breathing Pattern - Ineffective  Goal: Ability to achieve and maintain a regular respiratory rate  Outcome: Progressing Towards Goal     Problem: Body Temperature -  Risk of, Imbalanced  Goal: Ability to maintain a body temperature within defined limits  Outcome: Progressing Towards Goal  Goal: Complications related to the disease process, condition or treatment will be avoided or minimized  Outcome: Progressing Towards Goal     Problem: Isolation Precautions - Risk of Spread of Infection  Goal: Prevent transmission of infectious organism to others  Outcome: Progressing Towards Goal     Problem: Nutrition Deficits  Goal: Optimize nutrtional status  Outcome: Progressing Towards Goal     Problem: Risk for Fluid Volume Deficit  Goal: Maintain normal heart rhythm  Outcome: Progressing Towards Goal  Goal: Maintain absence of muscle cramping  Outcome: Progressing Towards Goal     Problem: Pressure Injury - Risk of  Goal: *Prevention of pressure injury  Description: Document Christopher Scale and appropriate interventions in the flowsheet.   Outcome: Progressing Towards Goal  Note: Pressure Injury Interventions:  Sensory Interventions: Assess changes in LOC, Assess need for specialty bed, Keep linens dry and wrinkle-free, Float heels    Moisture Interventions: Apply protective barrier, creams and emollients    Activity Interventions: Pressure redistribution bed/mattress(bed type)    Mobility Interventions: Pressure redistribution bed/mattress (bed type)    Nutrition Interventions: Document food/fluid/supplement intake    Friction and Shear Interventions: Apply protective barrier, creams and emollients

## 2020-08-17 NOTE — PROGRESS NOTES
Interdisciplinary team rounds were held with the following team members:Care Management, Nursing and Physician's Assistant. Plan of care discussed. See clinical pathway and/or care plan for interventions and desired outcomes.

## 2020-08-17 NOTE — PROGRESS NOTES
SPEECH PATHOLOGY NOTE:    Discussed with MD. Patient still not appropriate for po trials. No functional improvement since last attempt. Remains NPO with NGT. Will re attempt If/when medically appropriate.        Bree[address] CCC-SLP

## 2020-08-17 NOTE — PROGRESS NOTES
Hospitalist Note     Admit Date:  8/10/2020  3:32 PM   Name:  Kyle Edwards   Age:  81 y.o.  :  1935   MRN:  [id number]   PCP:  Hiram Sexton MD  Treatment Team: Attending Provider: Sadiq Hooper MD; Utilization Review: Graeme Villegas, RN; Care Manager: London Maher RN; Consulting Provider: Ad Chow DO; Physical Therapist: Yissel Boudreaux DPT; Speech Language Pathologist: Mannie Boeck; Occupational Therapist: Minerva Mcguire    HPI/Subjective:   Mrs. Joe Phillip is an 80 y/o AAF with a h/o DM, HTN who was recently hospitalized from - for UTI and metabolic encephalopathy. Tested for COVID due to placement and was positive. Weaned from 2L to RA. DC to [facility] on . Was reportedly tolerating diet and following commands. Brought back on 8/10 with encephalopathy, O2 sats with EMS 86% (but no hypoxia since admission). Remained non-verbal, no respiratory issues.  Neurology feel encephalopathy due to persistent delirium. Started on TFs via NGT on .    : No change clinically. Non-verbal. Does not follow commands. TFs running. Unable to get ROS. No other complaints  Objective:     Patient Vitals for the past 24 hrs:   Temp Pulse Resp BP SpO2   20 0727 98.2 °F (36.8 °C) 78 17 145/50 92 %   20 0420 98.4 °F (36.9 °C) 74 18 132/49 96 %   20 2354 98.6 °F (37 °C) 69 18 120/50 94 %   20 1950 99.1 °F (37.3 °C) 79 18 151/54 95 %   20 1510 99.8 °F (37.7 °C) 77 18 120/50 92 %   20 1100 98.4 °F (36.9 °C) 74 18 127/75 96 %     Oxygen Therapy  O2 Sat (%): 92 % (20 0727)  Pulse via Oximetry: 53 beats per minute (08/10/20 1844)  O2 Device: Room air (20 0830)    Estimated body mass index is 28.8 kg/m² as calculated from the following:    Height as of this encounter: 5' 5\" (1.651 m). Weight as of 20: 78.5 kg (173 lb 1 oz).       Intake/Output Summary (Last 24 hours) at 2020 08  Last data filed at 2020 0830  Gross per 24 hour   Intake 70 ml   Output    Net 70 ml       *Note that automatically entered I/Os may not be accurate; dependent on patient compliance with collection and accurate  by techs. General:    Chronically ill appearing. Non-verbal. No following commands. CV:   RRR. No murmur, rub, or gallop. Lungs:   CTAB. No wheezing, rhonchi, or rales. Abdomen:   Soft, nontender, nondistended. NGT right nare, TFs running. Extremities: Warm and dry. No cyanosis or edema. Left transmetatarsal amputation. Skin:     No rashes or jaundice. Neuro: Moves all extremities but does not speak, track or follow any commands.     Data Reviewed:  I have reviewed all labs, meds, and studies from the last 24 hours:  Recent Results (from the past 24 hour(s))   GLUCOSE, POC    Collection Time: 08/16/20 11:14 AM   Result Value Ref Range    Glucose (POC) 289 (H) 65 - 100 mg/dL   GLUCOSE, POC    Collection Time: 08/16/20  4:25 PM   Result Value Ref Range    Glucose (POC) 141 (H) 65 - 100 mg/dL   GLUCOSE, POC    Collection Time: 08/16/20  9:21 PM   Result Value Ref Range    Glucose (POC) 185 (H) 65 - 100 mg/dL   PHOSPHORUS    Collection Time: 08/17/20  4:20 AM   Result Value Ref Range    Phosphorus 2.2 (L) 2.3 - 3.7 MG/DL   MAGNESIUM    Collection Time: 08/17/20  4:20 AM   Result Value Ref Range    Magnesium 2.0 1.8 - 2.4 mg/dL   METABOLIC PANEL, BASIC    Collection Time: 08/17/20  4:20 AM   Result Value Ref Range    Sodium 142 136 - 145 mmol/L    Potassium 4.5 3.5 - 5.1 mmol/L    Chloride 108 (H) 98 - 107 mmol/L    CO2 29 21 - 32 mmol/L    Anion gap 5 (L) 7 - 16 mmol/L    Glucose 232 (H) 65 - 100 mg/dL    BUN 17 8 - 23 MG/DL    Creatinine 0.89 0.6 - 1.0 MG/DL    GFR est AA >60 >60 ml/min/1.73m2    GFR est non-AA >60 >60 ml/min/1.73m2    Calcium 9.6 8.3 - 10.4 MG/DL   GLUCOSE, POC    Collection Time: 08/17/20  7:25 AM   Result Value Ref Range    Glucose (POC) 354 (H) 65 - 100 mg/dL        Current Meds:  Current Facility-Administered Medications   Medication Dose Route Frequency    famotidine (PF) (PEPCID) 20 mg in 0.9% sodium chloride 10 mL injection  20 mg IntraVENous Q12H    lip protectant (BLISTEX) ointment 1 Each  1 Each Topical PRN    NUTRITIONAL SUPPORT ELECTROLYTE PRN ORDERS   Does Not Apply PRN    insulin lispro (HUMALOG) injection   SubCUTAneous AC&HS    dextrose 40% (GLUTOSE) oral gel 1 Tube  15 g Oral PRN    glucagon (GLUCAGEN) injection 1 mg  1 mg IntraMUSCular PRN    dextrose (D50W) injection syrg 12.5-25 g  25-50 mL IntraVENous PRN    sodium chloride (NS) flush 5-40 mL  5-40 mL IntraVENous Q8H    sodium chloride (NS) flush 5-40 mL  5-40 mL IntraVENous PRN    acetaminophen (TYLENOL) tablet 650 mg  650 mg Oral Q6H PRN    Or    acetaminophen (TYLENOL) suppository 650 mg  650 mg Rectal Q6H PRN    polyethylene glycol (MIRALAX) packet 17 g  17 g Oral DAILY PRN    promethazine (PHENERGAN) tablet 12.5 mg  12.5 mg Oral Q6H PRN    Or    ondansetron (ZOFRAN) injection 4 mg  4 mg IntraVENous Q6H PRN    enoxaparin (LOVENOX) injection 40 mg  40 mg SubCUTAneous DAILY       Other Studies:  No results found for this visit on 08/10/20. No results found.     All Micro Results     None          SARS-CoV-2 Lab Results  \"Novel Coronavirus\" Test: No results found for: COV2NT   \"Emergent Disease\" Test: No results found for: EDPR  \"SARS-COV-2\" Test: No results found for: XGCOVT  \"Precision Labs\" Test: No results found for: RSLT  Rapid Test:   Lab Results   Component Value Date/Time    COVR Detected (A) 08/13/2020 08:52 AM            Assessment and Plan:     Hospital Problems as of 8/17/2020 Date Reviewed: 2/28/2017          Codes Class Noted - Resolved POA    COVID-19 virus infection ICD-10-CM: U07.1  ICD-9-CM: 079.89  8/10/2020 - Present Unknown        KAILEY (acute kidney injury) ([facility] ICD-10-CM: N17.9  ICD-9-CM: 584.9  8/2/2020 - Present Yes        * (Principal) Acute metabolic encephalopathy NWL-72-MI: G93.41  ICD-9-CM: 348.31  8/2/2020 - Present Unknown        Alzheimer's dementia with behavioral disturbance (HCC) (Chronic) ICD-10-CM: G30.9, F02.81  ICD-9-CM: 331.0, 294.11  8/2/2020 - Present Yes        DNR (do not resuscitate) (Chronic) ICD-10-CM: Q11  ICD-9-CM: V49.86  8/2/2020 - Present Yes        Diabetes mellitus, type II ([facility] (Chronic) ICD-10-CM: E11.9  ICD-9-CM: 250.00  Unknown - Present Yes        Benign hypertension (Chronic) ICD-10-CM: I10  ICD-9-CM: 401.1  Unknown - Present Yes              Plan:  # Acute metabolic encephalopathy              - QVOI CT with chronic ischemic changes.             - Appreciate Neuro eval -- likely delirium due to multitude of recent medical issues; if she recovers it could take a few more weeks, no further work up needed.              - Will d/w Franc Fox today. No clinical improvement with TFs for several days. Patient is Hospice appropriate.     # HypoK              - WDWZZFAU.     # KAILEY              - VEWBMHGN.      # HyperNa              - IWWUCAVE. Off IVFs.    # Recent COVID-19 infection              - On RA. Completed Decadron during and after prior hospital stay. Con't conservative management. Afebrile.      # HTN              - Hold home meds     # DM2              - SSI for now.      DC planning/Dispo: Unclear. Will d/w family. Diet:  DIET NPO  DIET TUBE FEEDING  DVT ppx: Lovenox.     Signed:  Gregg Bennett MD

## 2020-08-17 NOTE — PROGRESS NOTES
Comprehensive Nutrition Assessment    Type and Reason for Visit: Reassess(TF management (Hospitallist))    Nutrition Recommendations/Plan:   Continue TF and water flush. Nutrition Assessment:  Patient with PMH of HTN, DM2, edema, HLD, dementia. She was recently discharged from [facility] after being treated for AMS from UTI. She presneted back with AMS and hypooxia. She has tested positive for COVID. Neuro following and AMS thought likely multifactoral.  Patient discussed with RN. She is tolerating TF without issue. Per notes MD not following commands and is hospice appropriate. Edema: Trace BLE  Abdomen: active BS, last recorded BM 8/17 - loose per RN  Labs remarkable for: K+ WNL, Glucose 232, Phos 2.2, POC glucose 141-354 last 24 hrs  Pertinent Medications: SSI (16 yesterday, 16 thus far today), Miralax PRN     Estimated Daily Nutrient Needs:  Energy (kcal):  3302-2658(92-64 kcal/kg (78.5 kg))  Protein (g):  79-98(20% kcal)       Fluid (ml/day):  9052-4862(~1 ml/kcal)    Nutrition Related Findings:  deferred due to isolation      Current Nutrition Therapies:   DIET NPO  DIET TUBE FEEDING Due not start TF until NGT placement confirmed. Current Tube Feeding (TF) Orders:   · Feeding Route: Nasogastric  · Formula: Glucerna 1.5  · Schedule:Continuous    · Regimen: 45 ml/hr  · Water Flushes: 35 ml/hr  · Current TF & Flush Orders Provides: 1620 kcal (100%% estimated calorie needs), 89 grams protein (100% estimated protein needs) and 1660ml free fluid (~1ml/kcal). Anthropometric Measures:  · Height:  5' 5\" (165.1 cm)  · Current Body Wt:  78.5 kg (173 lb 1 oz)(unknown weight source)   · Body mass index is 28.8 kg/m². · BMI Category: Overweight (BMI 25.0-29. 9)       Nutrition Diagnosis:   · Inadequate oral intake related to cognitive or neurological impairment as evidenced by (AMS, NPO per SLP)    Nutrition Interventions:   Food and/or Nutrient Delivery: Continue NPO, Continue tube feeding  Coordination of Nutrition Care: (Discussed with Debbie Savage RN)    Goals:  Maintain at least 75% nutrition needs with EN until able to take PO       Nutrition Monitoring and Evaluation:   Food/Nutrient Intake Outcomes: Food and nutrient intake    Discharge Planning:     Too soon to determine     [address] [place], LD on 8/17/2020 at 2:58 PM  Contact: [phone]

## 2020-08-17 NOTE — PROGRESS NOTES
Shift assessment completed. Pt is alert but non-verbal.  No signs of pain or discomfort noted at this time. NAD noted. Pt is turned and repositioned as needed. Bed in low and locked position. Call light within reach. All safety measures in place.

## 2020-08-18 ENCOUNTER — HOSPICE ADMISSION (OUTPATIENT)
Dept: HOSPICE | Facility: HOSPICE | Age: 85
End: 2020-08-18

## 2020-08-18 LAB
GLUCOSE BLD STRIP.AUTO-MCNC: 213 MG/DL (ref 65–100)
GLUCOSE BLD STRIP.AUTO-MCNC: 235 MG/DL (ref 65–100)
GLUCOSE BLD STRIP.AUTO-MCNC: 274 MG/DL (ref 65–100)
GLUCOSE BLD STRIP.AUTO-MCNC: 276 MG/DL (ref 65–100)

## 2020-08-18 PROCEDURE — 74011636637 HC RX REV CODE- 636/637: Performed by: INTERNAL MEDICINE

## 2020-08-18 PROCEDURE — 74011000250 HC RX REV CODE- 250: Performed by: INTERNAL MEDICINE

## 2020-08-18 PROCEDURE — 77030018798 HC PMP KT ENTRL FED COVD -A

## 2020-08-18 PROCEDURE — 65270000029 HC RM PRIVATE

## 2020-08-18 PROCEDURE — 74011250636 HC RX REV CODE- 250/636: Performed by: INTERNAL MEDICINE

## 2020-08-18 PROCEDURE — 82962 GLUCOSE BLOOD TEST: CPT

## 2020-08-18 RX ADMIN — INSULIN LISPRO 4 UNITS: 100 INJECTION, SOLUTION INTRAVENOUS; SUBCUTANEOUS at 21:43

## 2020-08-18 RX ADMIN — INSULIN LISPRO 6 UNITS: 100 INJECTION, SOLUTION INTRAVENOUS; SUBCUTANEOUS at 11:40

## 2020-08-18 RX ADMIN — Medication 10 ML: at 05:18

## 2020-08-18 RX ADMIN — Medication 10 ML: at 15:54

## 2020-08-18 RX ADMIN — Medication 10 ML: at 21:44

## 2020-08-18 RX ADMIN — INSULIN LISPRO 4 UNITS: 100 INJECTION, SOLUTION INTRAVENOUS; SUBCUTANEOUS at 16:01

## 2020-08-18 RX ADMIN — ENOXAPARIN SODIUM 40 MG: 40 INJECTION SUBCUTANEOUS at 08:10

## 2020-08-18 RX ADMIN — INSULIN LISPRO 6 UNITS: 100 INJECTION, SOLUTION INTRAVENOUS; SUBCUTANEOUS at 08:10

## 2020-08-18 RX ADMIN — FAMOTIDINE 20 MG: 10 INJECTION INTRAVENOUS at 08:11

## 2020-08-18 NOTE — PROGRESS NOTES
Pt's daughter Sunny Henriquez called via phone, left message that patient has been transferred to room 502.

## 2020-08-18 NOTE — PROGRESS NOTES
Chief Complaint   Patient presents with   • Physical     Labs done   • Imm/Inj     Flu        SUBJECTIVE:  Richa Queen is a 39 year old  female seen today for a physical.  Other concerns:    Doing well with medications.     Periods normal.       DIET: Follows regular diet.     EXERCISE:  Not regular.       Obstetric History     No data available       Immunization History   Administered Date(s) Administered   • Hepatitis B Adult 06/06/1996, 10/03/1996, 02/06/1997   • INFLUENZA QUADRIVALENT 01/26/2016, 09/29/2016   • Influenza 09/23/2009, 11/16/2010, 10/07/2011, 11/28/2012, 12/02/2014   • Influenza A novel H1N1 11/11/2009   • Td:Adult type tetanus/diphtheria 06/06/1996   • Tdap 11/16/2010       Health Maintenance Summary     Topic Due On Due Status Completed On    Pap Smear - Cervical Cancer Screening  Aug 13, 2020 Not Due Aug 13, 2015    Immunization - TDAP Pregnancy  Hidden     IMMUNIZATION - DTaP/Tdap/Td Nov 16, 2020 Not Due Nov 16, 2010    Immunization-Influenza Sep 1, 2017 Overdue Sep 29, 2016          Patient Active Problem List    Diagnosis Date Noted   • Depression 06/08/2014     Priority: Low   • 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 90 capsule 1   • buPROPion (WELLBUTRIN XL) 150 MG 24 hr tablet Take 1 tablet by mouth daily. 90 tablet 1   • azelastine (ASTELIN) 0.1 % nasal spray Spray 2 sprays in each nostril 2  Transfer received without s/sx distress. times daily. 1 Bottle 3   • clindamycin (CLINDAGEL) 1 % gel APPLY TOPICALLY TWICE DAILY. 30 g 4   • loratadine (CLARITIN) 10 MG tablet Take 10 mg by mouth daily.     • ranitidine (ZANTAC) 150 MG tablet 1 tablet po daily. 90 tablet 3   • Multiple Vitamins-Minerals (MULTIVITAMIN PO) Take  by mouth.     • cholecalciferol (VITAMIN D3) 1000 UNITS tablet Take 1,000 Units by mouth daily.          Past Medical History:   Diagnosis Date   • Abnormal Pap smear    • Depressive disorder        Past Surgical History:   Procedure Laterality Date   • COLPOSCOPY,LOOP ELECTRD CERVIX EXCIS  06/01/2003    LEEP   • D AND C  05/14/2009    D&C   • ORAL SURGERY PROCEDURE      Allen Teeth Extraction,. age 18   • PAP SMEAR,ROUTINE  03/02/2012       Family History   Problem Relation Age of Onset   • High blood pressure Mother    • Heart disease Mother    • Heart disease Father 50   • Heart disease Paternal Aunt    • Heart disease Paternal Uncle    • Cancer Paternal Grandfather    • Heart disease Paternal Grandfather        Social History     Social History   • Marital status:      Spouse name: N/A   • Number of children: N/A   • Years of education: N/A     Social History Main Topics   • Smoking status: Never Smoker   • Smokeless tobacco: Never Used   • Alcohol use Yes   • Drug use: No   • Sexual activity: Yes     Partners: Male     Other Topics Concern   • None     Social History Narrative     , 2 KIDS      7 and 5.          REVIEW OF SYSTEMS:  GENERAL: Denies fever, chills, or appetite changes.    SKIN: Denies any concerning lesions. No hair or nail concerns.  No bruising.  EYES: Denies visual problems.  ENT: Denies hearing concerns, sinus congestion, rhinorrhea.  CARDIOVASCULAR:  Denies chest pain, palpitations and peripheral edema.  RESPIRATORY:  Denies shortness of breath, wheezing, coughing or snoring.  BREASTS: Denies lumps, pain, skin changes or nipple discharge.  GI: Denies abdominal pain, nausea, vomiting, diarrhea,  constipation.  : Denies dysuria, frequency, urgency, bleeding or incontinence.    GYN: Denies vaginal discharge.  No history of sexually transmitted infections or concerns.   MUSCULOSKELETAL: Denies any joint or muscle pain.   NEUROLOGIC: Denies headache, dizziness, numbness, seizure or memory problems.  PSYCHOLOGICAL: Denies depression and anxiety.    DEPRESSION SCREEN: Over the past few weeks, have you felt down, depressed or hopeless? no  Over the past few weeks, have you felt little interest or pleasure in doing things? no    OBJECTIVE:  VITAL SIGNS:   Visit Vitals  /86 (BP Location: [facility] [place], Patient Position: Sitting, Cuff Size: Regular)   Pulse 70   Resp 16   Ht 5' 3\" (1.6 m)   Wt 83.5 kg   BMI 32.61 kg/m²     GENERAL: Alert, cooperative, pleasant female in no acute distress.  SKIN: Warm and dry.  Normal turgor.  No rashes or lesions noted.  EYES: Normal lids and lashes.  No conjunctivitis.  Sclera anicteric.   HENT: Head: Normocephalic, atraumatic.  Ears: TM's normal bilaterally.  External canals open. Hearing appropriate to conversation. Nose: Bilateral nares patent.  No drainage. Mouth: Mucosa pink and moist. No pharyngeal erythema or tonsillar exudate.   NECK: Soft, supple and no cervical or supraclavicular lymphadenopathy.  No thyromegaly.  CARDIOVASCULAR: Regular rate, normal S1, S2.  No murmur gallop or rub.  No peripheral edema.  RESPIRATORY: Lungs clear to auscultation.  No wheezes, rhonchi or crackles.  BREASTS: Soft and nontender to palpation.  No masses, skin dimpling or nipple discharge. No axillary lymphadenopathy.  GI/: Abdomen soft, nontender, nondistended, normoactive bowel sounds. No hepatosplenomegaly or palpable masses.  Bladder nonpalpable.   MUSCULOSKELETAL: Upper and lower extremities symmetric with equal strength bilaterally. No joint deformities.  Spine with normal curvature.  NEUROLOGIC: Oriented X3.  Cranial nerves intact.  Sensation grossly intact throughout.  PSYCHOLOGIC:  Good eye contact, thoughts connected.  Dress and appearance appropriate.  Mood and affect normal and congruent.      ASSESSMENT:    Normal physical exam  Hyperlipidemia  Depression        PLAN:      Up to date on Pap.   Discussed labs. Patient does have elevated cholesterol for which recommended lifestyle modification detail. Also has low normal vitamin D so should supplement vitamin D on a regular basis.  Counseled regarding substance abuse and STD.   Discussed life style modification .   30 min of moderate exercise, 5 times per week or 45 min of intensive exercise 3 times per week along with strengthening exercise.  Discussed folic acid (400 mg daily ) and calcium ( 1200 mg ) and  Vitamin D (800 IU, min ) daily   Discussed regular eye exam, dental visits. Self skin checks. SPF > 50 .   Self breast  exams.   Immunizations as per nurse notes.       She is doing very well with the current regimen. Will see her back for 6 months for medication check again.

## 2020-08-18 NOTE — PROGRESS NOTES
Assessment done via doc flow sheet. Pt resting quietly in bed, respirations regular, lung sounds diminished, sat on room air 94%. Pt is non verbal but opens eyes spontaneously to voice. No command following noted. NGT to right nare intact, with tube feeding of Glucerna 1.5 continuous at 45 ml/hour and water flushes @ 35 ml/hr via kangaroo pump. In no acute distress. Will monitor.

## 2020-08-18 NOTE — PROGRESS NOTES
Progress Note    Patient: Hiren Prabhakar MRN: [id number]  SSN: [id number]    YOB: 1935  Age: 81 y.o. Sex: female      Admit Date: 8/10/2020    LOS: 8 days     Subjective:     Patient seen and examined at bedside.  This morning patient somnolent but arousable, non verbal.     Objective:     Vitals:    08/18/20 0310 08/18/20 0743 08/18/20 1127 08/18/20 1541   BP: 149/82 (!) 130/92 131/70    Pulse: 86 88 76 (P) 81   Resp: 16 17 18 (P) 18   Temp: 98.4 °F (36.9 °C) 98.6 °F (37 °C) 98.4 °F (36.9 °C) (P) 98.3 °F (36.8 °C)   SpO2: 98% 94% 95% (P) 98%   Weight:       Height:            Intake and Output:  Current Shift: 08/18 0701 - 08/18 1900  In: 496   Out: 200 [Urine:200]  Last three shifts: 08/16 1901 - 08/18 0700  In: 35   Out: 1200 [Urine:1200]    ROS  Unable to obtain since patient somnolent and non verbal     Physical Exam:   General: Somnolent NAD  HEENT: NC/AT, EOM are intact  Neck: supple, no JVD  Cardiovascular: RRR, S1, S2, no murmurs  Respiratory: Lungs are clear, no wheezes or rales  Abdomen: Soft, NT, ND  Back: No CVA tenderness, no paraspinal tenderness  Extremities: LE without pedal edema, no erythema  Neuro: CN are intact, no focal deficits  Skin: no rash or ulcers  Psych: good mood and affect    Lab/Data Review:  I have personally reviewed patients laboratory data showing  Recent Results (from the past 24 hour(s))   GLUCOSE, POC    Collection Time: 08/17/20  8:44 PM   Result Value Ref Range    Glucose (POC) 151 (H) 65 - 100 mg/dL   GLUCOSE, POC    Collection Time: 08/18/20  7:24 AM   Result Value Ref Range    Glucose (POC) 276 (H) 65 - 100 mg/dL   GLUCOSE, POC    Collection Time: 08/18/20 11:36 AM   Result Value Ref Range    Glucose (POC) 274 (H) 65 - 100 mg/dL   GLUCOSE, POC    Collection Time: 08/18/20  3:58 PM   Result Value Ref Range    Glucose (POC) 235 (H) 65 - 100 mg/dL        Image:  I have personally reviewed patients imaging showing  XR ABD (KUB)   Final Result IMPRESSION:   1. Enteric tube side-port and tip in the gastric body. CT HEAD WO CONT   Final Result   IMPRESSION:     ATROPHY WITH EXTENSIVE SMALL VESSEL ISCHEMIC DISEASE BUT NO   ACUTE INTRACRANIAL ABNORMALITY IDENTIFIED AT NONCONTRAST CT.               XR CHEST PORT   Final Result           Hospital problems     Principal Problem:    Acute metabolic encephalopathy (3/5/2316)    Active Problems:    Diabetes mellitus, type II (HCC) ()      Benign hypertension ()      KAILEY (acute kidney injury) ([facility] [address] (8/2/2020)      Alzheimer's dementia with behavioral disturbance ([facility] [address] (8/2/2020)      DNR (do not resuscitate) (8/2/2020)      COVID-19 virus infection (8/10/2020)        Assessment and Plan:   82 y/o AAF with a h/o DM, HTN who was recently hospitalized from 8/2-7 for UTI and metabolic encephalopathy. Tested for COVID due to placement and was positive. Weaned from 2L to RA. DC to [facility] on 8/7. Was reportedly tolerating diet and following commands. Brought back on 8/10 with encephalopathy, O2 sats with EMS 86% (but no hypoxia since admission). Remained non-verbal, no respiratory issues.  Neurology feel encephalopathy due to persistent delirium. Started on TFs via NGT on 8/13.     # Acute encephalopathy concerning of worsening dementia and delirium in the setting of recent hospitalization  - Head CT without acute intracranial abnormalities   - UA without signs of UTI  - CXR without signs of pna   - Neurology recs appreciated   - After speaking with daughter she would like to consult hospice due to concerns of aspiration and not candidate for PEG tube as per GI   - Continue tube feeds for now by NGT  - Hospice consult      # Hypokalemia, resolved     # KAILEY, resolved     # Hypernatremia, resolved     # Recent COVID-19 infection  - Currently onn RA  - Completed Decadron during and after prior hospital stay  - Con't conservative management     # HTN  - Hold home meds since normotensive      # DM2  - ISS  - BS ACHS      DVT ppx: Lovenox    I have reviewed, updated, and verified this note's content and spent 38 minutes of my 42 minutes visit performing counseling and coordination of care regarding medical management.      Signed By: Jasiel Veloz MD     August 18, 2020

## 2020-08-18 NOTE — PROGRESS NOTES
TRANSFER - OUT REPORT:    Verbal report given to [address] Gale RN(name) on Cy Ali  being transferred to 5th floor room 502(unit) for routine progression of care       Report consisted of patients Situation, Background, Assessment and   Recommendations(SBAR). Information from the following report(s) SBAR and Kardex was reviewed with the receiving nurse. Lines:   Peripheral IV 08/14/20 Right Antecubital (Active)   Site Assessment Clean, dry, & intact 08/18/20 1500   Phlebitis Assessment 0 08/18/20 1500   Infiltration Assessment 0 08/18/20 1500   Dressing Status Clean, dry, & intact 08/18/20 1500   Dressing Type Tape;Transparent 08/18/20 1500   Hub Color/Line Status Capped 08/18/20 1500        Opportunity for questions and clarification was provided.

## 2020-08-18 NOTE — PROGRESS NOTES
Problem: Falls - Risk of  Goal: *Absence of Falls  Description: Document Marcus Brandt Fall Risk and appropriate interventions in the flowsheet. Outcome: Progressing Towards Goal  Note: Fall Risk Interventions:  Mobility Interventions: Bed/chair exit alarm    Mentation Interventions: Adequate sleep, hydration, pain control    Medication Interventions: Bed/chair exit alarm    Elimination Interventions: Bed/chair exit alarm              Problem: Breathing Pattern - Ineffective  Goal: Ability to achieve and maintain a regular respiratory rate  Outcome: Progressing Towards Goal     Problem: Body Temperature -  Risk of, Imbalanced  Goal: Ability to maintain a body temperature within defined limits  Outcome: Progressing Towards Goal     Problem: Nutrition Deficits  Goal: Optimize nutrtional status  Outcome: Progressing Towards Goal     Problem: Pressure Injury - Risk of  Goal: *Prevention of pressure injury  Description: Document Christopher Scale and appropriate interventions in the flowsheet.   Outcome: Progressing Towards Goal  Note: Pressure Injury Interventions:  Sensory Interventions: Assess changes in LOC    Moisture Interventions: Absorbent underpads, Internal/External urinary devices    Activity Interventions: Pressure redistribution bed/mattress(bed type)    Mobility Interventions: Float heels, HOB 30 degrees or less, Pressure redistribution bed/mattress (bed type)    Nutrition Interventions: Document food/fluid/supplement intake    Friction and Shear Interventions: Apply protective barrier, creams and emollients, Feet elevated on foot rest, Foam dressings/transparent film/skin sealants, HOB 30 degrees or less, Lift sheet                Problem: Nutrition Deficit  Goal: *Optimize nutritional status  Outcome: Progressing Towards Goal

## 2020-08-18 NOTE — CONSULTS
Gastroenterology Consultation Note          Date of consultation:  8/18/2020     Consulting physician:  William Herrera. Andrew Klein M.D. Chief complaint:  Dysphagia, Evaluate for PEG placement     History of Present Illness:  Patient is a 81 y.o. female with history of diabetes and hypertension Admitted with hypoxemia and altered mental status. She tested positive for COVID. She was recently hospitalized with urinary tract infection and metabolic encephalopathy. Patient is nonverbal and history is obtained through her medical record. Her mental status is fail to improve over the past several days and she has been confirmed as appropriate for hospice care. We have been asked to discuss with family the option of PEG placement. PMH:  Past Medical History:   Diagnosis Date    Benign hypertension     Controlled type 2 diabetes mellitus with diabetic autonomic neuropathy, with long-term current use of insulin (HCC)     Edema     Hyperlipidemia        PSH:  Past Surgical History:   Procedure Laterality Date    HX AMPUTATION Left     Partial foot    HX CORONARY ARTERY BYPASS GRAFT  2005    HX KNEE ARTHROSCOPY Bilateral     HX PARTIAL HYSTERECTOMY         Allergies:  No Known Allergies    Home Medications:  Prior to Admission medications    Medication Sig Start Date End Date Taking? Authorizing Provider   famotidine (PEPCID) 20 mg tablet Take 1 Tab by mouth two (2) times a day. 8/7/20   Jeniffer Dumont MD   insulin detemir U-100 (Levemir Flexpen) 100 unit/mL (3 mL) inpn 10 Units by SubCUTAneous route daily. 8/7/20   Jeniffer Dumont MD   valsartan (DIOVAN) 40 mg tablet Take 1 Tab by mouth daily. 8/7/20   Jeniffer Dumont MD   diclofenac EC (VOLTAREN) 75 mg EC tablet Take 1 Tab by mouth two (2) times a day.  10/27/17   Hiram Sexton MD   rosuvastatin (CRESTOR) 20 mg tablet TAKE 1 TABLET BY MOUTH EVERY DAY 9/25/17   Hiram Sexton MD   BD INSULIN PEN NEEDLE UF MINI 31 gauge x 3/16\" ndle USE 1 NEEDLE DAILY 8/28/17   Quinton Angélica Alejandro MD   insulin lispro (HUMALOG) 100 unit/mL injection by SubCUTAneous route.     Provider, Historical   TRAVATAN Z 0.004 % ophthalmic solution INSTILL 1 DROP IN BOTH EYES NIGHTLY 12/22/16   Provider, Historical   TRUE METRIX AIR GLUCOSE METER monitoring kit USE AS DIRECTED 1/9/17   Carmen Wing MD   BD SINGLE USE SWABS REGULAR padm  10/11/16   Provider, Historical   TRUE METRIX GLUCOSE TEST STRIP strip  10/11/16   Provider, Historical   TRUE METRIX LEVEL 1 soln  10/11/16   Provider, Historical   TRUEPLUS LANCETS 28 gauge misc  10/11/16   Provider, Historical       Hospital Medications:  Current Facility-Administered Medications   Medication Dose Route Frequency    famotidine (PF) (PEPCID) 20 mg in 0.9% sodium chloride 10 mL injection  20 mg IntraVENous Q24H    lip protectant (BLISTEX) ointment 1 Each  1 Each Topical PRN    NUTRITIONAL SUPPORT ELECTROLYTE PRN ORDERS   Does Not Apply PRN    insulin lispro (HUMALOG) injection   SubCUTAneous AC&HS    dextrose 40% (GLUTOSE) oral gel 1 Tube  15 g Oral PRN    glucagon (GLUCAGEN) injection 1 mg  1 mg IntraMUSCular PRN    dextrose (D50W) injection syrg 12.5-25 g  25-50 mL IntraVENous PRN    sodium chloride (NS) flush 5-40 mL  5-40 mL IntraVENous Q8H    sodium chloride (NS) flush 5-40 mL  5-40 mL IntraVENous PRN    acetaminophen (TYLENOL) tablet 650 mg  650 mg Oral Q6H PRN    Or    acetaminophen (TYLENOL) suppository 650 mg  650 mg Rectal Q6H PRN    polyethylene glycol (MIRALAX) packet 17 g  17 g Oral DAILY PRN    promethazine (PHENERGAN) tablet 12.5 mg  12.5 mg Oral Q6H PRN    Or    ondansetron (ZOFRAN) injection 4 mg  4 mg IntraVENous Q6H PRN    enoxaparin (LOVENOX) injection 40 mg  40 mg SubCUTAneous DAILY       Social History:  Social History     Tobacco Use    Smoking status: Never Smoker    Smokeless tobacco: Never Used   Substance Use Topics    Alcohol use: No     Pt denies any history of IV drug use, blood transfusions, tattoos, prophylactic antibiotics prior to dental work, cardiac stents, pacemaker placement, or vaccinations for Hep A or B. Family History:  Family History   Problem Relation Age of Onset    Heart Disease Mother        Review of Systems:  Unobtainable. Physical Exam:   Vitals:  Visit Vitals  BP (!) 130/92   Pulse 88   Temp 98.6 °F (37 °C)   Resp 17   Ht 5' 5\" (1.651 m)   Wt 78.5 kg (173 lb)   SpO2 94%   BMI 28.80 kg/m²       HEENT:  No scleral icterus, no oral ulcers    Heart:  Regular rate and rhythm, no murmurs  Lungs:  Clear to auscultation bilaterally, no accessory muscle use  Abdomen: Soft, nontender, normoactive bowel sounds, no organomegaly  Rectal:  Deferred  Extremities:  No cyanosis or leg edema  Skin:  No rashes, no spider angiomas over the anterior chest wall  Neuro:  Somnolent, not arousable or following commands   Lymphatic:  No cervical or supraclavicular adenopathy    Data:    Recent Results (from the past 24 hour(s))   GLUCOSE, POC    Collection Time: 08/17/20 11:24 AM   Result Value Ref Range    Glucose (POC) 288 (H) 65 - 100 mg/dL   GLUCOSE, POC    Collection Time: 08/17/20  4:03 PM   Result Value Ref Range    Glucose (POC) 181 (H) 65 - 100 mg/dL   GLUCOSE, POC    Collection Time: 08/17/20  8:44 PM   Result Value Ref Range    Glucose (POC) 151 (H) 65 - 100 mg/dL   GLUCOSE, POC    Collection Time: 08/18/20  7:24 AM   Result Value Ref Range    Glucose (POC) 276 (H) 65 - 100 mg/dL       impression/Plan:       69-year-old female with history of hypertension and diabetes admitted with altered mental status and hypoxemia, found to be COVID positive. In addition she has been deemed appropriate for hospice care based on failure to improve during this admission. We have been asked to discuss PEG placement with patient's family as it would be required for her to be discharged to the nursing home. Given her poor prognosis, she would not be an appropriate candidate for PEG placement.  I discussed this in detail with patient's daughter Delma. We will sign off, but do not hesitate to contact us for any additional assistance.      Signed:  Natanael Ricci MD  8/18/2020  8:27 AM

## 2020-08-18 NOTE — PROGRESS NOTES
TRANSFER - IN REPORT:    Verbal report received from [facility] on Ruth Murrieta  being received from [phone] for routine progression of care      Report consisted of patients Situation, Background, Assessment and   Recommendations(SBAR). Information from the following report(s) SBAR was reviewed with the receiving nurse. Opportunity for questions and clarification was provided.

## 2020-08-18 NOTE — PROGRESS NOTES
Received report from [facility] and patient is stable.  Will assess patient

## 2020-08-18 NOTE — PROGRESS NOTES
PT note: Chart reviewed. Communicated with RN then hospitalist via perfect serve. Per conversation, patient does not appear to be appropriate for physical therapy at this time. MD planning to discuss goals of care with family. Will hold for today and check back tomorrow.      Deidre Bradley DPT

## 2020-08-18 NOTE — PROGRESS NOTES
SPEECH PATHOLOGY NOTE:    Per RN, patient not alert or following commands. Not appropriate for po trials at this time. Will sign off for now. Please re-consult if/when medically appropriate and as in line with goals of care.       Susan Wallis MS, CCC-SLP

## 2020-08-18 NOTE — PROGRESS NOTES
Interdisciplinary team rounds were held 8/18/2020 with the following team members:Care Management, Nursing and Physician. Plan of care discussed. See clinical pathway and/or care plan for interventions and desired outcomes.

## 2020-08-19 LAB
ANION GAP SERPL CALC-SCNC: 4 MMOL/L (ref 7–16)
BASOPHILS # BLD: 0 K/UL (ref 0–0.2)
BASOPHILS NFR BLD: 0 % (ref 0–2)
BUN SERPL-MCNC: 17 MG/DL (ref 8–23)
CALCIUM SERPL-MCNC: 9.8 MG/DL (ref 8.3–10.4)
CHLORIDE SERPL-SCNC: 104 MMOL/L (ref 98–107)
CO2 SERPL-SCNC: 32 MMOL/L (ref 21–32)
CREAT SERPL-MCNC: 0.88 MG/DL (ref 0.6–1)
DIFFERENTIAL METHOD BLD: ABNORMAL
EOSINOPHIL # BLD: 0.1 K/UL (ref 0–0.8)
EOSINOPHIL NFR BLD: 1 % (ref 0.5–7.8)
ERYTHROCYTE [DISTWIDTH] IN BLOOD BY AUTOMATED COUNT: 14.2 % (ref 11.9–14.6)
GLUCOSE BLD STRIP.AUTO-MCNC: 211 MG/DL (ref 65–100)
GLUCOSE BLD STRIP.AUTO-MCNC: 224 MG/DL (ref 65–100)
GLUCOSE BLD STRIP.AUTO-MCNC: 253 MG/DL (ref 65–100)
GLUCOSE BLD STRIP.AUTO-MCNC: 262 MG/DL (ref 65–100)
GLUCOSE SERPL-MCNC: 285 MG/DL (ref 65–100)
HCT VFR BLD AUTO: 32.7 % (ref 35.8–46.3)
HGB BLD-MCNC: 10.3 G/DL (ref 11.7–15.4)
IMM GRANULOCYTES # BLD AUTO: 0 K/UL (ref 0–0.5)
IMM GRANULOCYTES NFR BLD AUTO: 0 % (ref 0–5)
LYMPHOCYTES # BLD: 2.5 K/UL (ref 0.5–4.6)
LYMPHOCYTES NFR BLD: 26 % (ref 13–44)
MAGNESIUM SERPL-MCNC: 2.1 MG/DL (ref 1.8–2.4)
MCH RBC QN AUTO: 27.1 PG (ref 26.1–32.9)
MCHC RBC AUTO-ENTMCNC: 31.5 G/DL (ref 31.4–35)
MCV RBC AUTO: 86.1 FL (ref 79.6–97.8)
MONOCYTES # BLD: 0.6 K/UL (ref 0.1–1.3)
MONOCYTES NFR BLD: 7 % (ref 4–12)
NEUTS SEG # BLD: 6.3 K/UL (ref 1.7–8.2)
NEUTS SEG NFR BLD: 66 % (ref 43–78)
NRBC # BLD: 0 K/UL (ref 0–0.2)
PHOSPHATE SERPL-MCNC: 3.1 MG/DL (ref 2.3–3.7)
PLATELET # BLD AUTO: 138 K/UL (ref 150–450)
PMV BLD AUTO: 13.8 FL (ref 9.4–12.3)
POTASSIUM SERPL-SCNC: 4.6 MMOL/L (ref 3.5–5.1)
RBC # BLD AUTO: 3.8 M/UL (ref 4.05–5.2)
SODIUM SERPL-SCNC: 140 MMOL/L (ref 136–145)
WBC # BLD AUTO: 9.6 K/UL (ref 4.3–11.1)

## 2020-08-19 PROCEDURE — 85025 COMPLETE CBC W/AUTO DIFF WBC: CPT

## 2020-08-19 PROCEDURE — 74011000250 HC RX REV CODE- 250: Performed by: INTERNAL MEDICINE

## 2020-08-19 PROCEDURE — 65270000029 HC RM PRIVATE

## 2020-08-19 PROCEDURE — 74011250636 HC RX REV CODE- 250/636: Performed by: INTERNAL MEDICINE

## 2020-08-19 PROCEDURE — 82962 GLUCOSE BLOOD TEST: CPT

## 2020-08-19 PROCEDURE — 97530 THERAPEUTIC ACTIVITIES: CPT

## 2020-08-19 PROCEDURE — 97535 SELF CARE MNGMENT TRAINING: CPT

## 2020-08-19 PROCEDURE — 74011636637 HC RX REV CODE- 636/637: Performed by: INTERNAL MEDICINE

## 2020-08-19 PROCEDURE — 84100 ASSAY OF PHOSPHORUS: CPT

## 2020-08-19 PROCEDURE — 36415 COLL VENOUS BLD VENIPUNCTURE: CPT

## 2020-08-19 PROCEDURE — 76450000000

## 2020-08-19 PROCEDURE — 80048 BASIC METABOLIC PNL TOTAL CA: CPT

## 2020-08-19 PROCEDURE — 83735 ASSAY OF MAGNESIUM: CPT

## 2020-08-19 RX ORDER — INSULIN LISPRO 100 [IU]/ML
INJECTION, SOLUTION INTRAVENOUS; SUBCUTANEOUS EVERY 6 HOURS
Status: DISCONTINUED | OUTPATIENT
Start: 2020-08-19 | End: 2020-08-27 | Stop reason: HOSPADM

## 2020-08-19 RX ADMIN — INSULIN LISPRO 6 UNITS: 100 INJECTION, SOLUTION INTRAVENOUS; SUBCUTANEOUS at 18:19

## 2020-08-19 RX ADMIN — INSULIN LISPRO 4 UNITS: 100 INJECTION, SOLUTION INTRAVENOUS; SUBCUTANEOUS at 06:44

## 2020-08-19 RX ADMIN — ENOXAPARIN SODIUM 40 MG: 40 INJECTION SUBCUTANEOUS at 09:04

## 2020-08-19 RX ADMIN — Medication 10 ML: at 22:16

## 2020-08-19 RX ADMIN — INSULIN LISPRO 6 UNITS: 100 INJECTION, SOLUTION INTRAVENOUS; SUBCUTANEOUS at 12:06

## 2020-08-19 RX ADMIN — Medication 10 ML: at 14:21

## 2020-08-19 RX ADMIN — Medication 10 ML: at 06:44

## 2020-08-19 RX ADMIN — FAMOTIDINE 20 MG: 10 INJECTION INTRAVENOUS at 09:03

## 2020-08-19 RX ADMIN — INSULIN LISPRO 4 UNITS: 100 INJECTION, SOLUTION INTRAVENOUS; SUBCUTANEOUS at 23:34

## 2020-08-19 NOTE — PROGRESS NOTES
Problem: Patient Education: Go to Patient Education Activity  Goal: Patient/Family Education  Description:   . Patient will follow 100% of one step verbal or visual commands to increase participation during ADL performance. 2. Patient will complete self-feeding with min A and adaptive equipment as needed. 3. Patient will tolerate 25  minutes of OT treatment with 2-3 rest breaks to increase activity tolerance for ADLs. 4. Patient will complete functional transfers with mod A and adaptive equipment as needed. 5. Patient will complete upper body bathing and dressing with min A and adaptive equipment as needed. 6. Patient will complete self-grooming with min A and adaptive equipment as needed. Timeframe: 7 visits     Outcome: Progressing Towards Goal    OCCUPATIONAL THERAPY: Daily Note and PM 8/19/2020  INPATIENT: OT Visit Days: 1  [place] / [place] / Product Type: Medicare /      NAME/AGE/GENDER: Yury Opsina is a 81 y.o. female   PRIMARY DIAGNOSIS:  Acute metabolic encephalopathy [X41.83]  COVID-19 virus infection [C58.4] Acute metabolic encephalopathy Acute metabolic encephalopathy       ICD-10: Treatment Diagnosis:    · Generalized Muscle Weakness (M62.81)  · Other lack of cordination (R27.8)  · Difficulty in walking, Not elsewhere classified (R26.2)   Precautions/Allergies:     Patient has no known allergies. ASSESSMENT:     Ms. Indigo Cannon presents for the above diagnoses. Upon arrival, pt supine in bed. Pt is alert, however presents with increased confusion, decreased command following, and decreased awareness of environment this session. Per chart, pt was a long-term resident at [facility] where she was requiring assistance with ADLs and ambulation. 8/19/2020: Pt found supine in bed on room air. Pt drowsy but appears agreeable to OT/PT co-treatment to increase activity tolerance and likelihood of return to baseline.  OT worked on self care while PT worked on functional transfers. Pt does not present with any pain at rest. Patient is observed to self initiate supine to sit when asked to sit edge of bed, but requires Total A x 2 to complete secondary to decreased strength and increased fatigue. Seated edge of bed, pt with poor static and poor dynamic balance with posterior lean. Pt is provided with maximal verbal, visual, and tactile cueing to complete bathing tasks but presents with little to no command following and requires Total A x 2 (for balance and performance of self care task) to complete upper body bathing and lower body bathing and dressing. Pt is observed to have no verbalizations throughout session and is unable to follow verbal, visual, and tactile cues to improve posture and balance for seated ADLs. Pt requires Total A x 2 for sit to supine and to scoot higher in bed. Pt presents with preference to be left in sidelying position and is left with head of bed elevated for increased safety for NG tube feeding. All needs met and within reach. RN notified. At this time, patient is appropriate for Co-treatment with physical therapy due to patient's clinical complexity, decreased overall endurance/tolerance levels, as well as need for high level assistance and cues and intervention to complete functional transfers/mobility and functional tasks in safe manner. Turner Osler is appropriate for a multidisciplinary co-treatment of PT and OT to address goals of both disciplines. This section established at most recent assessment   PROBLEM LIST (Impairments causing functional limitations):  1. Decreased Strength  2. Decreased ADL/Functional Activities  3. Decreased Transfer Abilities  4. Decreased Ambulation Ability/Technique  5. Decreased Balance  6. Decreased Activity Tolerance  7. Decreased Cognition   INTERVENTIONS PLANNED: (Benefits and precautions of occupational therapy have been discussed with the patient.)  1.  Activities of daily living training  2. Adaptive equipment training  3. Balance training  4. Clothing management  5. Cognitive training  6. Community reintergration  7. Donning&doffing training  8. Neuromuscular re-eduation  9. Re-evaluation  10. Therapeutic activity  11. Therapeutic exercise     TREATMENT PLAN: Frequency/Duration: Follow patient 2x/week trial basis to address above goals. Rehabilitation Potential For Stated Goals: Good     REHAB RECOMMENDATIONS (at time of discharge pending progress):    Placement: It is my opinion, based on this patient's performance to date, that Ms. Alex King may benefit from intensive therapy at a [address] [place] [address] after discharge due to the functional deficits listed above that are likely to improve with skilled rehabilitation and concerns that he/she may be unsafe to be unsupervised at home due to decline in ability to safely perform ADLs and functional mobility. .  Equipment:    TBD              OCCUPATIONAL PROFILE AND HISTORY:   History of Present Injury/Illness (Reason for Referral):  See H&P  Past Medical History/Comorbidities:   Ms. Alex King  has a past medical history of Benign hypertension, Controlled type 2 diabetes mellitus with diabetic autonomic neuropathy, with long-term current use of insulin ([address] Edema, and Hyperlipidemia. Ms. Alex King  has a past surgical history that includes hx coronary artery bypass graft (2005); hx knee arthroscopy (Bilateral); hx amputation (Left); and hx partial hysterectomy. Social History/Living Environment:   Home Environment: Skilled nursing facility  One/Two Story Residence: Other (Comment)  Living Alone: No  Support Systems: [facility], [facility](toir)  Patient Expects to be Discharged to[de-identified] Skilled nursing facility  Current DME Used/Available at Home: Wheelchair  Prior Level of Function/Work/Activity:  Assist with ADLs and functional mobility; resident at [facility] July. Personal Factors:          Sex:  female        Age:  81 y.o.         Other factors that influence how disability is experienced by the patient:  multiple co-morbidities    Number of Personal Factors/Comorbidities that affect the Plan of Care: Brief history (0):  LOW COMPLEXITY   ASSESSMENT OF OCCUPATIONAL PERFORMANCE[de-identified]   Activities of Daily Living:   Basic ADLs (From Assessment) Complex ADLs (From Assessment)   Feeding: Total assistance  Oral Facial Hygiene/Grooming: Total assistance  Bathing: Total assistance  Upper Body Dressing: Total assistance  Lower Body Dressing: Total assistance  Toileting: Total assistance Instrumental ADL  Meal Preparation: Total assistance  Homemaking: Total assistance   Grooming/Bathing/Dressing Activities of Daily Living         Upper Body Bathing  Bathing Assistance: Total assistance(dependent)  Position Performed: Seated edge of bed     Lower Body Bathing  Bathing Assistance: Total assistance(dependent)  Position Performed: (Seated edge of bed)           Lower Body Dressing Assistance  Socks: Total assistance (dependent) Bed/Mat Mobility  Supine to Sit: Total assistance;Assist x2  Sit to Supine: Total assistance;Assist x2  Sit to Stand: (Did not attempt)  Scooting: Total assistance;Assist x2     Most Recent Physical Functioning:   Gross Assessment:                  Posture:  Posture (WDL): Exceptions to WDL  Posture Assessment: Cervical, Forward head  Balance:  Sitting: Impaired  Sitting - Static: Poor (constant support)  Sitting - Dynamic: Poor (constant support)  Standing: (Unsafe to attempt) Bed Mobility:  Supine to Sit: Total assistance;Assist x2  Sit to Supine: Total assistance;Assist x2  Scooting:  Total assistance;Assist x2  Wheelchair Mobility:     Transfers:  Sit to Stand: (Did not attempt)            Patient Vitals for the past 6 hrs:   BP BP Patient Position SpO2 Pulse   08/19/20 1117 139/58 At rest 97 % 75   08/19/20 1456 129/78 At rest 96 % 77       Mental Status  Neurologic State: Eyes open spontaneously  Orientation Level: Unable to verbalize  Cognition: Unable to assess (comment)  Perception: Appears intact  Perseveration: No perseveration noted  Safety/Judgement: Decreased awareness of environment, Decreased awareness of need for assistance, Decreased awareness of need for safety, Decreased insight into deficits                          Physical Skills Involved:  1. Balance  2. Strength  3. Activity Tolerance  4. Gross Motor Control Cognitive Skills Affected (resulting in the inability to perform in a timely and safe manner):  1. Perception  2. Executive Function  3. Immediate Memory  4. Short Term Recall  5. Long Term Memory  6. Sustained Attention  7. Divided Attention  8. Comprehension  9. Expression Psychosocial Skills Affected:  1. Habits/Routines  2. Environmental Adaptation   Number of elements that affect the Plan of Care: 5+:  HIGH COMPLEXITY   CLINICAL DECISION MAKIN[address] [place] [address] AM-PAC 6 Clicks   Daily Activity Inpatient Short Form  How much help from another person does the patient currently need. .. Total A Lot A Little None   1. Putting on and taking off regular lower body clothing? [x] 1   [] 2   [] 3   [] 4   2. Bathing (including washing, rinsing, drying)? [x] 1   [] 2   [] 3   [] 4   3. Toileting, which includes using toilet, bedpan or urinal?   [x] 1   [] 2   [] 3   [] 4   4. Putting on and taking off regular upper body clothing? [x] 1   [] 2   [] 3   [] 4   5. Taking care of personal grooming such as brushing teeth? [x] 1   [] 2   [] 3   [] 4   6. Eating meals? [x] 1   [] 2   [] 3   [] 4   © , Trustees of [address] [place] [id number], under license to [employer]. All rights reserved      Score:  Initial: 6 Most Recent: X (Date: -- )    Interpretation of Tool:  Represents activities that are increasingly more difficult (i.e. Bed mobility, Transfers, Gait). Medical Necessity:     · Patient demonstrates   · good  ·  rehab potential due to higher previous functional level.   Reason for Services/Other Comments:  · Patient continues to require skilled intervention due to   · medical complications and patient unable to attend/participate in therapy as expected  · . Use of outcome tool(s) and clinical judgement create a POC that gives a: LOW COMPLEXITY         TREATMENT:   (In addition to Assessment/Re-Assessment sessions the following treatments were rendered)     Pre-treatment Symptoms/Complaints: Pt presents with no pain at rest, during, or following functional mobility. Pain: Initial:   Pain Intensity 1: 0 /10 Post Session:  same     Today's treatment session addressed Decreased ADL/Functional Activities, Decreased Activity Tolerance, Increased Fatigue and Decreased Cognition to progress towards achieving goal(s) listed above. During this session, Physical Therapy addressed  Functional Transfers and Balance to progress towards their discipline specific goal(s). Co-treatment was necessary to improve patient's cognitive participation, ability to follow higher level commands, ability to increase activity demands and ability to return to normal functional activity. Self Care: (25 minutes): Procedure(s)  utilized to improve and/or restore self-care/home management as related to dressing and bathing. Required total visual, verbal, manual and tactile cueing to facilitate activities of daily living skills. Pt is provided with maximal verbal, visual, and tactile cueing to complete bathing tasks but presents with little to no command following and requires Total A x 2 (for balance and performance of self care task) to complete upper body bathing and lower body bathing and dressing. Pt is observed to have no verbalizations throughout session and is unable to follow verbal, visual, and tactile cues to improve posture and balance for seated ADLs.     Braces/Orthotics/Lines/Etc:   · nasogastric tube  · O2 Device: Room air  Treatment/Session Assessment:    · Response to Treatment:  Pt with no verbalizations and little participation with therapy. · Interdisciplinary Collaboration:   o Physical Therapist  o Occupational Therapist  o Registered Nurse  · After treatment position/precautions:   o Supine in bed  o Bed/Chair-wheels locked  o Bed in low position  o Call light within reach  o RN notified  o Head of bed elevated for increased safety   · Compliance with Program/Exercises: Will assess as treatment progresses. · Recommendations/Intent for next treatment session: \"Next visit will focus on advancements to more challenging activities and reduction in assistance provided\".   Total Treatment Duration:  OT Patient Time In/Time Out  Time In: 1430  Time Out: 1455     Coni Rodriguez OTR/L

## 2020-08-19 NOTE — PROGRESS NOTES
Patient had no urine out put all night and all morning. Bladder scan showed 454. Perfect served MD and Md ordered In/Out cath. This nurse performed IN/out cath and patient drained 600 cc into urine bag at this time. Patient tolerated well.

## 2020-08-19 NOTE — PROGRESS NOTES
's phone call attempted. Ms. Marnie Mireles is unable to talk on the phone. Staff answered the phone and informed me that family is expected to visit some time today. Encouraged staff that chaplains remain available for family care if desired.  can be contacted at [phone].     Stan Hidalgo 69  Board Certified

## 2020-08-19 NOTE — PROGRESS NOTES
Family meeting with [facility] and 3 other family members. Currently patient is hemodynamically stable but due to her likely end stage dementia , her prognosis is poor due to lack of PO intake and desire for PO intake. She has declining mental status due to her dementia and was downgraded from regular food to soft/pureed diet at the NH prior to her last admission. Given her recent infection with UTI and COVID+, her mental status has declined and dementia has likely worsened. Explained to them the risks of PEG placement and aspiration given her current condition. They expressed understanding but have a hard time accepting the idea of not being able to provide some form of nutrition and thereby \"letting her starve to death\". Answered all their questions and concerns to the best of my knowledge. Palliative care consult placed to help address their concerns regarding hospice care/end of life care.

## 2020-08-19 NOTE — PROGRESS NOTES
Patient is alert to voice and not oriented. Patient resting in bed and no pain or distress noted. Will continue to monitor patient this shift.

## 2020-08-19 NOTE — PROGRESS NOTES
Name: Klaudia Montiel MRN: [id number]  : 1935  Age:85 y.o.  female  Admit Date:  8/10/2020 LOS: 9      Hospitalist Progress Note    Klaudia Mnotiel is a 81 y.o. female with medical history significant for DM, HTN who was recently hospitalized from - for UTI and metabolic encephalopathy. Tested for COVID due to placement and was positive. Weaned from 2L to RA. DC to [facility] on . Was reportedly tolerating diet and following commands. Brought back on 8/10 with encephalopathy, O2 sats with EMS 86% (but no hypoxia since admission). Remained non-verbal, no respiratory issues.  Neurology feel encephalopathy due to persistent delirium. Started on TFs via NGT on . Prior hospitalist spoke with family extensively and family is exploring options for Hospice. They are also deciding whether to pursue comfort measures. Subjective (20) :  Patient is seen and examined at bedside. No acute events reported overnight by nursing staff. Remains somnolent but arousable and non verbal. Slightly opens eyes and tract but otherwise does not follow commands. ROS: unable to obtain due to clinical condition. Objective:    Patient Vitals for the past 24 hrs:   Temp Pulse Resp BP SpO2   20 0315 98.9 °F (37.2 °C) 75 18 120/61 100 %   20 2238 98.1 °F (36.7 °C) 74 18 115/50 96 %   20 1907 97.9 °F (36.6 °C) 72 18 140/57 98 %   20 1541 98.3 °F (36.8 °C) 81 18  98 %   20 1127 98.4 °F (36.9 °C) 76 18 131/70 95 %   20 0743 98.6 °F (37 °C) 88 17 (!) 130/92 94 %     Oxygen Therapy  O2 Sat (%): 100 % (20 0315)  Pulse via Oximetry: 53 beats per minute (08/10/20 1844)  O2 Device: Room air (20)    Estimated body mass index is 33.5 kg/m² as calculated from the following:    Height as of this encounter: 5' 5\" (1.651 m). Weight as of this encounter: 91.3 kg (201 lb 4.8 oz).       Intake/Output Summary (Last 24 hours) at 2020 0265  Last data filed at 8/19/2020 0308  Gross per 24 hour   Intake 751 ml   Output 200 ml   Net 551 ml       *Note that automatically entered I/Os may not be accurate; dependent on patient compliance with collection and accurate  by techs. Physical Exam:  General:     Somnolent, not in acute distress  Head:   normocephalic, atraumatic  Eyes, Ears, nose: PERRL. Normal conjunctiva. NGT in place. Neck:    supple, non-tender. Trachea midline. Lungs:   CTAB, no wheezing, rhonchi, rales  Cardiac:   RRR, Normal S1 and S2. Abdomen:   Soft, non distended, nontender, +BS  Extremities:   Warm, dry. No edema. Left transmetatarsal amputation. Skin:   No rashes, no jaundice  Neuro:  Does not follow commands.  Moves all extremities  Psychiatric:  No anxiety, calm, cooperative    Data Review:  I have reviewed all labs, meds, and studies from the last 24 hours:    Recent Results (from the past 24 hour(s))   GLUCOSE, POC    Collection Time: 08/18/20 11:36 AM   Result Value Ref Range    Glucose (POC) 274 (H) 65 - 100 mg/dL   GLUCOSE, POC    Collection Time: 08/18/20  3:58 PM   Result Value Ref Range    Glucose (POC) 235 (H) 65 - 100 mg/dL   GLUCOSE, POC    Collection Time: 08/18/20  8:26 PM   Result Value Ref Range    Glucose (POC) 213 (H) 65 - 100 mg/dL   GLUCOSE, POC    Collection Time: 08/19/20  5:44 AM   Result Value Ref Range    Glucose (POC) 224 (H) 65 - 100 mg/dL        All Micro Results     None          Current Meds:  Current Facility-Administered Medications   Medication Dose Route Frequency    famotidine (PF) (PEPCID) 20 mg in 0.9% sodium chloride 10 mL injection  20 mg IntraVENous Q24H    lip protectant (BLISTEX) ointment 1 Each  1 Each Topical PRN    NUTRITIONAL SUPPORT ELECTROLYTE PRN ORDERS   Does Not Apply PRN    insulin lispro (HUMALOG) injection   SubCUTAneous AC&HS    dextrose 40% (GLUTOSE) oral gel 1 Tube  15 g Oral PRN    glucagon (GLUCAGEN) injection 1 mg  1 mg IntraMUSCular PRN    dextrose (D50W) injection syrg 12.5-25 g  25-50 mL IntraVENous PRN    sodium chloride (NS) flush 5-40 mL  5-40 mL IntraVENous Q8H    sodium chloride (NS) flush 5-40 mL  5-40 mL IntraVENous PRN    acetaminophen (TYLENOL) tablet 650 mg  650 mg Oral Q6H PRN    Or    acetaminophen (TYLENOL) suppository 650 mg  650 mg Rectal Q6H PRN    polyethylene glycol (MIRALAX) packet 17 g  17 g Oral DAILY PRN    promethazine (PHENERGAN) tablet 12.5 mg  12.5 mg Oral Q6H PRN    Or    ondansetron (ZOFRAN) injection 4 mg  4 mg IntraVENous Q6H PRN    enoxaparin (LOVENOX) injection 40 mg  40 mg SubCUTAneous DAILY         Other Studies:  Ct Head Wo Cont    Result Date: 8/10/2020  NONCONTRAST HEAD CT CLINICAL HISTORY:  Decreased level of consciousness. TECHNIQUE:  Axial images were obtained with spiral technique. Radiation dose reduction was achieved using one or all of the following techniques: automated exposure control, weight-based dosing, iterative reconstruction. COMPARISON:  August 2, 2020. REPORT:   Standard noncontrast head CT demonstrates no definite intracranial mass effect, hemorrhage, or evidence of acute geographic infarction. Extensive white matter hypodensities are most consistent with small vessel ischemic disease. Prominent calcification in the basal ganglia and dentate nuclei may be physiologic but are again noted to suggest the possibility of hyperparathyroidism/pseudohyperparathyroidism. Mild atrophy is again noted. Orbits  and paranasal sinuses are clear where imaged. Bone windows demonstrate no definite fracture or destruction. IMPRESSION:     ATROPHY WITH EXTENSIVE SMALL VESSEL ISCHEMIC DISEASE BUT NO ACUTE INTRACRANIAL ABNORMALITY IDENTIFIED AT NONCONTRAST CT. Xr Chest Port    Result Date: 8/10/2020  History: Hypoxia Exam: portable chest Comparison: 8/4/2020 Findings: There is a mild edema-like pattern to the lungs, similar to the findings seen previously.  No change in the appearance of the mediastinal contour or osseous structures. Impressions: Stable portable chest          Assessment:    Active Hospital Problems    Diagnosis Date Noted    COVID-19 virus infection 08/10/2020    Acute metabolic encephalopathy 69/94/6032    KAILEY (acute kidney injury) ([facility] 08/02/2020    [facility] with [facility] 08/02/2020    DNR (do not resuscitate) 08/02/2020    Benign hypertension     Diabetes mellitus, type II ([facility]        Plan:    Acute encephalopathy   concerning of worsening dementia and delirium in the setting of recent hospitalization. Head CT without acute intracranial abnormalities. UA without signs of UTI and CXR without signs of pna.   - Neurology recs appreciated   - Hospice Consult by prior hospitalist as patient is not a candidate for PEG per GI and family would like to explore hospice options. - Continue tube feeds for now by NGT  - Family to come today to visit before deciding about comfort care    Hypokalemia: resolved  KAILEY:resolved  Hypernatremia: resolved     Recent COVID-19 infection  No respiratory issues. Currently on RA with sats > 96%  Completed Decadron during and after prior hospital stay  Con't conservative management     HTN: Hold home meds since normotensive      DM2 : continue with sliding scale     Diet:  DIET NPO  DIET TUBE FEEDING  DVT PPx: lovenox  Code: DNR  Dispo: Hospice eval  Estimated Discharge: 24-48hrs    Labs/Imaging Reviewed. Patient is Moderate risk due to current condition and comorbid conditions. Plan discussed with staff, patient/family and are in agreement. Time Spent: Greater than 45 minutes was spent in reviewing charts, physical exam, discussion with patient, and answered any questions. Additionally I spent greater than 30minutes having a family meeting with Daquan Friedman and 3 other family memebers.     Signed By: Jose Ferrari MD     August 19, 2020

## 2020-08-19 NOTE — HOSPICE
[facility]-    I called and had a long talk with Alexandria downing, about hospice services and comfort care. She states that she is somewhat familiar as she has had other family members receive hospice support. She states that she is allowed to see her mother today and she wants to try and assess what the next step would be best for her mother. She states that \"she doesn't want to give up on her mother but that she does not want to do heroic things either and have her suffer\". Sanchez Rodriges has my contact info. prn questions or if there is anything hospice can do to help.     Thank you for this referral-    Daniel Chiu RN BSN  Hospice Liaison  [phone]

## 2020-08-19 NOTE — PROGRESS NOTES
SBAR from [facility]. Patient in stable condition with resps even/unlabored. NAD noted. Patient has NGT noted to right nare with TF running. Safety measures noted. Will continue to monitor per policy. Droplet plus precautions intact. Appropriate PPE available and in use.

## 2020-08-19 NOTE — PROGRESS NOTES
All goals ongoing slow progression 8/19/2020   STG:  (1.)Ms. Joe Phillip will move from supine to sit and sit to supine , scoot up and down, and roll side to side with MAXIMAL ASSIST within 4 treatment day(s). (2.)Ms. Joe Phillip will transfer from bed to chair and chair to bed with DEPENDENT using the least restrictive device within 4 treatment day(s). (3.)Ms. Joe Phillip will ambulate with MAXIMAL ASSIST for 3 feet with the least restrictive device within 4 treatment day(s). LTG:  (1.)Ms. Joe Phillip will move from supine to sit and sit to supine , scoot up and down, and roll side to side in bed with MODERATE ASSIST within 7 treatment day(s). (2.)Ms. Joe Phillip will transfer from bed to chair and chair to bed with MODERATE ASSIST using the least restrictive device within 7 treatment day(s). (3.)Ms. Joe Phillip will ambulate with MODERATE ASSIST for 5 feet with the least restrictive device within 7 treatment day(s). ________________________________________________________________________________________________      PHYSICAL THERAPY: Daily Note and PM 8/19/2020  INPATIENT: PT Visit Days : 3  [place] / [place] / Product Type: Medicare /       NAME/AGE/GENDER: Kyle Edwards is a 81 y.o. female   PRIMARY DIAGNOSIS: Acute metabolic encephalopathy [I11.27]  COVID-19 virus infection [F41.1] Acute metabolic encephalopathy Acute metabolic encephalopathy       ICD-10: Treatment Diagnosis:    · Generalized Muscle Weakness (M62.81)  · Other lack of cordination (R27.8)  · Difficulty in walking, Not elsewhere classified (R26.2)  · Other abnormalities of gait and mobility (R26.89)   Precaution/Allergies:  Patient has no known allergies. ASSESSMENT:     Ms. Joe Phillip   is a disabled female with above diagnosis and flat affect with right sided lean,  who demonstrates with decreased transfers, ambulation and mobility below her prior functional baseline.    All transfers are currently limited at TOTAL ASSISTANCE x 2 bed mobility. OT cotreatment. [place] is positioned EOB dangling with rear trunk support. OT performing ADL's with patient. Trial skilled PT is indicated for this patient's functional mobility deficits. Hospice consult is pending. Patient requires cues to perform exercises correctly. Overall poor to fair progress towards physical therapy goals. Goals listed above are appropriate but slow progression 8/19/2020. PT will continue efforts as patient is still below functional baseline. At this time, patient is appropriate for Co-treatment with occupational therapy when available due to patient's decreased overall endurance/tolerance levels, as well as need for high level skilled assistance to complete functional transfers/mobility and functional tasks. [place] is appropriate for a multidisciplinary co-treatment of PT and OT to address goals of both disciplines. This section established at most recent assessment   PROBLEM LIST (Impairments causing functional limitations):  1. Decreased Strength  2. Decreased ADL/Functional Activities  3. Decreased Transfer Abilities  4. Decreased Ambulation Ability/Technique  5. Decreased Balance  6. Increased Pain  7. Decreased Activity Tolerance  8. Decreased Pacing Skills   INTERVENTIONS PLANNED: (Benefits and precautions of physical therapy have been discussed with the patient.)  1. Balance Exercise  2. Bed Mobility  3. Family Education  4. Therapeutic Activites  5. Therapeutic Exercise/Strengthening     TREATMENT PLAN: Frequency/Duration: 3 for duration of hospital stay  Rehabilitation Potential For Stated Goals: [facility] (at time of discharge pending progress):    Placement: It is my opinion, based on this patient's performance to date, that Ms. Emerson Lei may benefit from intensive therapy at a [address] [place] [address] after discharge due to the functional deficits listed above that are likely to improve with skilled rehabilitation and concerns that he/she may be unsafe to be unsupervised at home due to debility and decreased mobility. .  Equipment:    None at this time              HISTORY:   History of Present Injury/Illness (Reason for Referral):  PER MD H&P   Farzad Yung is a 81 y.o. female with medical history significant diabetes, hypertension, recently discharged from [facility] (8/2-8/7) after being treated for AMS due to UTI. She was tested for COVID during the admission and required 2L O2 NC for acute hypoxic respiratory failure but was weaned down to room air after a few days. SLP evaluated the patient and has approved for pureed diet. Per conversation with the daughter during the admission, patient has been having decreased PO intake and difficulty with eating and as a result, SLP at [facility] has recommended pureed diet as well. She was minimally verbal (although unclear speech), alert and following commands with saturations above 94% on room air on discharge on 8/7 back to [address] [place] [address]. P    Patient presented today due to altered mental status and decreased appetite. Patient was reportedly hypoxic upon EMS's arrival with saturating of 86% on room air. In the ED, patient is hemodynamically stable, saturating at 95% on room air. Labs are unremarkable and similar to labs on the day of discharge except for increased BUN/Cr of 36/1.37 (from 17/0.79). CXR showed no acute cardiopulmonary disease and appears slightly improved in the bilateral infiltrate. 10 systems reviewed and negative except as noted in HPI. Past Medical History/Comorbidities:   Ms. Iraj Rawls  has a past medical history of Benign hypertension, Controlled type 2 diabetes mellitus with diabetic autonomic neuropathy, with long-term current use of insulin ([facility] [address] Edema, and Hyperlipidemia.   Ms. Iraj Rawls  has a past surgical history that includes hx coronary artery bypass graft (2005); hx knee arthroscopy (Bilateral); hx amputation (Left); and hx partial hysterectomy. Social History/Living Environment:   Home Environment: Skilled nursing facility  One/Two Story Residence: Other (Comment)  Living Alone: No  Support Systems: Marcelino Brantley, Child(tori)  Patient Expects to be Discharged to[de-identified] Skilled nursing facility  Current DME Used/Available at Home: Wheelchair  Prior Level of Function/Work/Activity:  Limited and virtually bedbound. Dominant Side:         RIGHT    Personal Factors:          Sex:  female        Age:  81 y.o. Number of Personal Factors/Comorbidities that affect the Plan of Care: 1-2: MODERATE COMPLEXITY   EXAMINATION:   Most Recent Physical Functioning:   Gross Assessment:  AROM: Grossly decreased, non-functional  Strength: Grossly decreased, non-functional  Coordination: Grossly decreased, non-functional  Tone: Normal  Sensation: Impaired               Posture:  Posture (WDL): Exceptions to WDL  Posture Assessment: Cervical, Forward head  Balance:  Sitting: Impaired  Sitting - Static: Poor (constant support)  Sitting - Dynamic: Poor (constant support)  Standing: (unsafe/unable to attempt) Bed Mobility:  Supine to Sit: Total assistance;Assist x2  Sit to Supine: Total assistance;Assist x2  Scooting: Total assistance;Assist x2  Wheelchair Mobility:     Transfers:  Sit to Stand: (unable )  Gait:            Body Structures Involved:  1. Bones  2. Joints  3. Muscles  4. Ligaments Body Functions Affected:  1. Neuromusculoskeletal  2. Movement Related  3. Skin Related  4. Metobolic/Endocrine Activities and Participation Affected:  1. Communication  2. Mobility  3. Self Care  4. Domestic Life  5.  Community, Social and [place]   Number of elements that affect the Plan of Care: 3: MODERATE COMPLEXITY   CLINICAL PRESENTATION:   Presentation: Evolving clinical presentation with changing clinical characteristics: MODERATE COMPLEXITY   CLINICAL DECISION MAKIN [facility] Form  How much difficulty does the patient currently have. .. Unable A Lot A Little None   1. Turning over in bed (including adjusting bedclothes, sheets and blankets)? [x] 1   [] 2   [] 3   [] 4   2. Sitting down on and standing up from a chair with arms ( e.g., wheelchair, bedside commode, etc.)   [x] 1   [] 2   [] 3   [] 4   3. Moving from lying on back to sitting on the side of the bed? [x] 1   [] 2   [] 3   [] 4   How much help from another person does the patient currently need. .. Total A Lot A Little None   4. Moving to and from a bed to a chair (including a wheelchair)? [x] 1   [] 2   [] 3   [] 4   5. Need to walk in hospital room? [x] 1   [] 2   [] 3   [] 4   6. Climbing 3-5 steps with a railing? [x] 1   [] 2   [] 3   [] 4   © 2007, Trustees of [address] [place] [address], under license to [employer]. All rights reserved      Score:  Initial: 6 Most Recent: X (Date: -- )    Interpretation of Tool:  Represents activities that are increasingly more difficult (i.e. Bed mobility, Transfers, Gait). Medical Necessity:     · Patient demonstrates   · poor  ·  rehab potential due to higher previous functional level. Reason for Services/Other Comments:  · Patient continues to require skilled intervention due to   · medical complications, patient unable to attend/participate in therapy as expected, and flat affect and debility with decreased mobility. · .   Use of outcome tool(s) and clinical judgement create a POC that gives a: Questionable prediction of patient's progress: MODERATE COMPLEXITY            TREATMENT:   (In addition to Assessment/Re-Assessment sessions the following treatments were rendered)   Pre-treatment Symptoms/Complaints:  0/10 no pain reported.      Pain: Initial:   Pain Intensity 1: 0  Post Session:  0/10    Today's treatment session addressed Decreased Strength, Decreased ADL/Functional Activities, Decreased Transfer Abilities, Decreased Ambulation Ability/Technique and Decreased Balance to progress towards achieving goal(s) 3. During this session, Occupational Therapy addressed ADLs to progress towards their discipline specific goal(s). Co-treatment was necessary to improve patient's cognitive participation, ability to follow higher level commands, ability to increase activity demands and ability to return to normal functional activity. Therapeutic Activity: (    25 mins): Therapeutic activities including Bed transfers and bed mobility, dangling EOB and trunk mobility balance to improve mobility, strength, balance, and coordination. Required moderate   to promote coordination of bilateral, lower extremity(s) and promote motor control of bilateral, lower extremity(s). Therapeutic Exercise: ( 0 mins):  Exercises per grid below to improve mobility, strength, balance, and coordination. Required minimal visual, verbal, manual, and tactile cues to promote proper body alignment, promote proper body posture, and promote proper body mechanics. Progressed repetitions as indicated. Bed to Chair seated mobility. Date:  8/12  Date:   Date:     Activity/Exercise Parameters Parameters Parameters   AP's  10 x's      HIP ABD  10 x's      Marches  10 x's     LAQ's 10 x's                         Braces/Orthotics/Lines/Etc:   · O2 Device: Room air and PURE WICK   Treatment/Session Assessment:    · Response to Treatment: limited mobility and flat affect with rightward trunk lean. · Interdisciplinary Collaboration:   o Physical Therapist  · After treatment position/precautions:   o Supine in bed  o Bed alarm/tab alert on  o Bed/Chair-wheels locked  o Bed in low position  o Call light within reach  o RN notified  o Side rails x 3   · Compliance with Program/Exercises: Noncompliant much of the time  · Recommendations/Intent for next treatment session:   \"Next visit will focus on advancements to more challenging activities, reduction in assistance provided, and transfers, and mobility for bilateral LE's. \".   Total Treatment Duration:  PT Patient Time In/Time Out  Time In: 1430  Time Out: [address] [place], [place]

## 2020-08-19 NOTE — PROGRESS NOTES
Problem: Falls - Risk of  Goal: *Absence of Falls  Description: Document Maliha Nichole Fall Risk and appropriate interventions in the flowsheet. 8/19/2020 1801 by Debi EUBANKS  Outcome: Progressing Towards Goal  Note: Fall Risk Interventions:  Mobility Interventions: Bed/chair exit alarm    Mentation Interventions: Reorient patient    Medication Interventions: Bed/chair exit alarm    Elimination Interventions: Toileting schedule/hourly rounds           8/19/2020 1801 by Debi EUBANKS  Outcome: Progressing Towards Goal  Note: Fall Risk Interventions:  Mobility Interventions: Bed/chair exit alarm    Mentation Interventions: Reorient patient    Medication Interventions: Bed/chair exit alarm    Elimination Interventions:  Toileting schedule/hourly rounds              Problem: Gas Exchange - Impaired  Goal: Absence of hypoxia  8/19/2020 1801 by Debi EUBANKS  Outcome: Progressing Towards Goal  8/19/2020 1801 by Debi EUBANKS  Outcome: Progressing Towards Goal

## 2020-08-19 NOTE — PROGRESS NOTES
Patient remains in stable condition with respirations even/unlabored. No acute distress noted. No needs noted or voiced at this time. Safety measures in place. Brief changed and patient repositioned on left side. Patient remains minimally responsive. No apparent pain. Room air. Call light within reach. Preparing to give report to oncoming shift.

## 2020-08-19 NOTE — PROGRESS NOTES
Patient has NGT to right nare and tube feeding of glucerna running at 45 with water at 35cc and no problems with feeding. Patient on room air and no breathing problems at this time. Patient blood sugar 224 and 4 units humalog given. Patient still non-verbal but alert. No pain or distress noted.   Report given to Andria Modi

## 2020-08-19 NOTE — PROGRESS NOTES
Received bedside shift report from [facility], [address] [place] [address] and patient is stable at this time.  Will assess patient

## 2020-08-20 LAB
GLUCOSE BLD STRIP.AUTO-MCNC: 226 MG/DL (ref 65–100)
GLUCOSE BLD STRIP.AUTO-MCNC: 236 MG/DL (ref 65–100)
GLUCOSE BLD STRIP.AUTO-MCNC: 247 MG/DL (ref 65–100)

## 2020-08-20 PROCEDURE — 74011636637 HC RX REV CODE- 636/637: Performed by: INTERNAL MEDICINE

## 2020-08-20 PROCEDURE — 65270000029 HC RM PRIVATE

## 2020-08-20 PROCEDURE — 74011250636 HC RX REV CODE- 250/636: Performed by: INTERNAL MEDICINE

## 2020-08-20 PROCEDURE — 74011000250 HC RX REV CODE- 250: Performed by: INTERNAL MEDICINE

## 2020-08-20 PROCEDURE — 99222 1ST HOSP IP/OBS MODERATE 55: CPT | Performed by: INTERNAL MEDICINE

## 2020-08-20 PROCEDURE — 82962 GLUCOSE BLOOD TEST: CPT

## 2020-08-20 PROCEDURE — 77030018798 HC PMP KT ENTRL FED COVD -A

## 2020-08-20 RX ADMIN — INSULIN LISPRO 4 UNITS: 100 INJECTION, SOLUTION INTRAVENOUS; SUBCUTANEOUS at 17:52

## 2020-08-20 RX ADMIN — INSULIN LISPRO 4 UNITS: 100 INJECTION, SOLUTION INTRAVENOUS; SUBCUTANEOUS at 12:29

## 2020-08-20 RX ADMIN — ENOXAPARIN SODIUM 40 MG: 40 INJECTION SUBCUTANEOUS at 09:49

## 2020-08-20 RX ADMIN — INSULIN LISPRO 4 UNITS: 100 INJECTION, SOLUTION INTRAVENOUS; SUBCUTANEOUS at 05:59

## 2020-08-20 RX ADMIN — FAMOTIDINE 20 MG: 10 INJECTION INTRAVENOUS at 09:49

## 2020-08-20 RX ADMIN — Medication 10 ML: at 22:18

## 2020-08-20 RX ADMIN — Medication 10 ML: at 05:41

## 2020-08-20 RX ADMIN — Medication 10 ML: at 09:49

## 2020-08-20 NOTE — PROGRESS NOTES
Patient is able to look at me when I come in the room and speak to her. Patient does not react to questions either with verbal response or change of gaze. Patient gaze gradually slides elsewhere. No other response noted. Patient in stable condition. No acute signs of distress were noted. No discomfort noted. No apparent needs at this time. Will continue to monitor.

## 2020-08-20 NOTE — PROGRESS NOTES
Patient blood sugar is 211 and 4 units of humalog given at this time to the left upper arm. Patient is calm and cooperative.  No distress noted

## 2020-08-20 NOTE — PROGRESS NOTES
Pt is resting comfortably bed at this time. Pt is on RA. Pt is unable to verbalize responses at this time. Pt is on RA. Pt has NG tube in with Glucerna 1.5 going at 35mL/hr with 45mL flushes. Pt has safety measures in place. Pt has call light within reach. Will continue to monitor.

## 2020-08-20 NOTE — PROGRESS NOTES
Patient resting in bed and alert but non-verbal. Patient breathing is even and unlabored. No pain or distress noted. Frequent rounding on patient. Will prepare for bedside report for morning nurse.

## 2020-08-20 NOTE — PROGRESS NOTES
Assessment completed and patient is alert and non-verbal. This nurse monitoring patient frequently and caring rounding.  Will continue to assess

## 2020-08-20 NOTE — PROGRESS NOTES
Name: Meño Marshall MRN: [id number]  : 1935  Age:85 y.o.  female  Admit Date:  8/10/2020 LOS: 10      Hospitalist Progress Note    Meño Marshall is a 81 y.o. female with medical history significant for DM, HTN who was recently hospitalized from - for UTI and metabolic encephalopathy. Tested for COVID due to placement and was positive. Weaned from 2L to RA. [facility] . Was reportedly tolerating diet and following commands. Brought back on 8/10 with encephalopathy, O2 sats with EMS 86% (but no hypoxia since admission). Remained non-verbal, no respiratory issues.  Neurology feel encephalopathy due to persistent delirium. Started on TFs via NGT on . Extensive discussions have been had with the family by Hospitalist team regarding poor prognosis given her mental status with underlying dementia. Family is exploring hospice care but having a hard time accepting the idea of patient being without any form of nutrition. Palliative care is also consulted for help with the family's decision making. Subjective (20) :  Patient is seen and examined at bedside. No acute events reported overnight by nursing staff. No acute changes from prior days. Remains hemodynamically stable. Remains somnolent but arousable. Nonverbal.  Slightly opens eyes and track. Does not follow commands. ROS: unable to obtain due to clinical condition.     Objective:    Patient Vitals for the past 24 hrs:   Temp Pulse Resp BP SpO2   20 1117 97.5 °F (36.4 °C)  20 155/58 94 %   20 0755 97.3 °F (36.3 °C) 74 20 147/58 93 %   20 0309 98.2 °F (36.8 °C) 78 18 147/61 96 %   20 2316 98.1 °F (36.7 °C) 77 18 115/63 97 %   20 1926 97.7 °F (36.5 °C) 81 16 127/79 99 %   20 1456 98.1 °F (36.7 °C) 77 19 129/78 96 %     Oxygen Therapy  O2 Sat (%): 94 % (20 1117)  Pulse via Oximetry: 53 beats per minute (08/10/20 1844)  O2 Device: Room air (20 0810)    Estimated body mass index is 33.5 kg/m² as calculated from the following:    Height as of this encounter: 5' 5\" (1.651 m). Weight as of this encounter: 91.3 kg (201 lb 4.8 oz). Intake/Output Summary (Last 24 hours) at 8/20/2020 1207  Last data filed at 8/20/2020 0920  Gross per 24 hour   Intake 105 ml   Output    Net 105 ml       *Note that automatically entered I/Os may not be accurate; dependent on patient compliance with collection and accurate  by techs. Physical Exam:  General:     Somnolent, not in acute distress  Head:   normocephalic, atraumatic  Eyes, Ears, nose: PERRL. Normal conjunctiva. NGT in place. Neck:    supple, non-tender. Trachea midline. Lungs:   CTAB, no wheezing, rhonchi, rales  Cardiac:   RRR, Normal S1 and S2. Abdomen:   Soft, non distended, nontender, +BS  Extremities:   Warm, dry. No edema. Left transmetatarsal amputation. Skin:   No rashes, no jaundice  Neuro:  Does not follow commands.  Moves all extremities  Psychiatric:  No anxiety, calm, cooperative    Data Review:  I have reviewed all labs, meds, and studies from the last 24 hours:    Recent Results (from the past 24 hour(s))   PHOSPHORUS    Collection Time: 08/19/20  1:42 PM   Result Value Ref Range    Phosphorus 3.1 2.3 - 3.7 MG/DL   MAGNESIUM    Collection Time: 08/19/20  1:42 PM   Result Value Ref Range    Magnesium 2.1 1.8 - 2.4 mg/dL   METABOLIC PANEL, BASIC    Collection Time: 08/19/20  1:42 PM   Result Value Ref Range    Sodium 140 136 - 145 mmol/L    Potassium 4.6 3.5 - 5.1 mmol/L    Chloride 104 98 - 107 mmol/L    CO2 32 21 - 32 mmol/L    Anion gap 4 (L) 7 - 16 mmol/L    Glucose 285 (H) 65 - 100 mg/dL    BUN 17 8 - 23 MG/DL    Creatinine 0.88 0.6 - 1.0 MG/DL    GFR est AA >60 >60 ml/min/1.73m2    GFR est non-AA >60 >60 ml/min/1.73m2    Calcium 9.8 8.3 - 10.4 MG/DL   CBC WITH AUTOMATED DIFF    Collection Time: 08/19/20  2:52 PM   Result Value Ref Range    WBC 9.6 4.3 - 11.1 K/uL    RBC 3.80 (L) 4.05 - 5.2 M/uL HGB 10.3 (L) 11.7 - 15.4 g/dL    HCT 32.7 (L) 35.8 - 46.3 %    MCV 86.1 79.6 - 97.8 FL    MCH 27.1 26.1 - 32.9 PG    MCHC 31.5 31.4 - 35.0 g/dL    RDW 14.2 11.9 - 14.6 %    PLATELET 105 (L) 299 - 450 K/uL    MPV 13.8 (H) 9.4 - 12.3 FL    ABSOLUTE NRBC 0.00 0.0 - 0.2 K/uL    DF AUTOMATED      NEUTROPHILS 66 43 - 78 %    LYMPHOCYTES 26 13 - 44 %    MONOCYTES 7 4.0 - 12.0 %    EOSINOPHILS 1 0.5 - 7.8 %    BASOPHILS 0 0.0 - 2.0 %    IMMATURE GRANULOCYTES 0 0.0 - 5.0 %    ABS. NEUTROPHILS 6.3 1.7 - 8.2 K/UL    ABS. LYMPHOCYTES 2.5 0.5 - 4.6 K/UL    ABS. MONOCYTES 0.6 0.1 - 1.3 K/UL    ABS. EOSINOPHILS 0.1 0.0 - 0.8 K/UL    ABS. BASOPHILS 0.0 0.0 - 0.2 K/UL    ABS. IMM.  GRANS. 0.0 0.0 - 0.5 K/UL   GLUCOSE, POC    Collection Time: 08/19/20  6:01 PM   Result Value Ref Range    Glucose (POC) 253 (H) 65 - 100 mg/dL   GLUCOSE, POC    Collection Time: 08/19/20 11:30 PM   Result Value Ref Range    Glucose (POC) 211 (H) 65 - 100 mg/dL   GLUCOSE, POC    Collection Time: 08/20/20  5:52 AM   Result Value Ref Range    Glucose (POC) 226 (H) 65 - 100 mg/dL   GLUCOSE, POC    Collection Time: 08/20/20 11:26 AM   Result Value Ref Range    Glucose (POC) 236 (H) 65 - 100 mg/dL        All Micro Results     None          Current Meds:  Current Facility-Administered Medications   Medication Dose Route Frequency    insulin lispro (HUMALOG) injection   SubCUTAneous Q6H    famotidine (PF) (PEPCID) 20 mg in 0.9% sodium chloride 10 mL injection  20 mg IntraVENous Q24H    lip protectant (BLISTEX) ointment 1 Each  1 Each Topical PRN    NUTRITIONAL SUPPORT ELECTROLYTE PRN ORDERS   Does Not Apply PRN    dextrose 40% (GLUTOSE) oral gel 1 Tube  15 g Oral PRN    glucagon (GLUCAGEN) injection 1 mg  1 mg IntraMUSCular PRN    dextrose (D50W) injection syrg 12.5-25 g  25-50 mL IntraVENous PRN    sodium chloride (NS) flush 5-40 mL  5-40 mL IntraVENous Q8H    sodium chloride (NS) flush 5-40 mL  5-40 mL IntraVENous PRN    acetaminophen (TYLENOL) tablet 650 mg  650 mg Oral Q6H PRN    Or    acetaminophen (TYLENOL) suppository 650 mg  650 mg Rectal Q6H PRN    polyethylene glycol (MIRALAX) packet 17 g  17 g Oral DAILY PRN    promethazine (PHENERGAN) tablet 12.5 mg  12.5 mg Oral Q6H PRN    Or    ondansetron (ZOFRAN) injection 4 mg  4 mg IntraVENous Q6H PRN    enoxaparin (LOVENOX) injection 40 mg  40 mg SubCUTAneous DAILY         Other Studies:  Ct Head Wo Cont    Result Date: 8/10/2020  NONCONTRAST HEAD CT CLINICAL HISTORY:  Decreased level of consciousness. TECHNIQUE:  Axial images were obtained with spiral technique. Radiation dose reduction was achieved using one or all of the following techniques: automated exposure control, weight-based dosing, iterative reconstruction. COMPARISON:  August 2, 2020. REPORT:   Standard noncontrast head CT demonstrates no definite intracranial mass effect, hemorrhage, or evidence of acute geographic infarction. Extensive white matter hypodensities are most consistent with small vessel ischemic disease. Prominent calcification in the basal ganglia and dentate nuclei may be physiologic but are again noted to suggest the possibility of hyperparathyroidism/pseudohyperparathyroidism. Mild atrophy is again noted. Orbits  and paranasal sinuses are clear where imaged. Bone windows demonstrate no definite fracture or destruction. IMPRESSION:     ATROPHY WITH EXTENSIVE SMALL VESSEL ISCHEMIC DISEASE BUT NO ACUTE INTRACRANIAL ABNORMALITY IDENTIFIED AT NONCONTRAST CT. Xr Chest Port    Result Date: 8/10/2020  History: Hypoxia Exam: portable chest Comparison: 8/4/2020 Findings: There is a mild edema-like pattern to the lungs, similar to the findings seen previously. No change in the appearance of the mediastinal contour or osseous structures.  Impressions: Stable portable chest          Assessment:    Active Hospital Problems    Diagnosis Date Noted    COVID-19 virus infection 08/10/2020    Acute metabolic encephalopathy 08/02/2020    KAILEY (acute kidney [facility]) ([facility] 08/02/2020    [facility] with [facility] 08/02/2020    DNR (do not resuscitate) 08/02/2020    Benign hypertension     Diabetes mellitus, type II ([facility]        Plan:    Acute encephalopathy   concerning of worsening dementia and delirium in the setting of recent hospitalization. Head CT without acute intracranial abnormalities. UA without signs of UTI and CXR without signs of pna.   - Neurology signed off. Recs appreciated. - GI signed off. Recs appreciated. Not a candidate for PEG given poor prognosis. - Palliate and Hospice consulted. Family is still deciding hospice care vs comfort care vs pursing PEG placement. - Continue tube feeds for now via NGT    Hypokalemia: resolved  KAILEY:resolved  Hypernatremia: resolved     Recent COVID-19 infection  No respiratory issues. Currently on RA with sats > 96%  Completed Decadron during and after prior hospital stay  Con't conservative management     HTN: Hold home meds since normotensive      DM2 : continue with sliding scale     Diet:  DIET NPO  DIET TUBE FEEDING  DVT PPx: lovenox  Code: DNR  Dispo: Hospice eval  Estimated Discharge: 24-48hrs    Labs/Imaging Reviewed. Patient is Moderate risk due to current condition and comorbid conditions. Plan discussed with staff, family and are in agreement. Time Spent: Greater than 45 minutes was spent in reviewing charts, physical exam, discussion with patient, and answered any questions.      Signed By: Jeremy Espana MD     August 20, 2020

## 2020-08-20 NOTE — PROGRESS NOTES
Patient remains in stable condition with respirations even/unlabored. No acute distress noted. No needs noted or voiced at this time. Safety measures in place. Patient on room air. NGT to right nare with TF infusing at MD ordered rate. Call light remains within reach. Preparing to give report to oncoming shift.

## 2020-08-20 NOTE — PROGRESS NOTES
CM spoke with Daughter Josh Hanna about Palliative Care referral  She is in agreement to allow them to call to discuss possibilities about palliative care in the LTC facility  Referral Faxed to [facility] attn: Deonte  Case Management will continue to follow

## 2020-08-20 NOTE — CONSULTS
Palliative Care    Patient: Johnathan Galdamez MRN: [id number]  SSN: [id number]    YOB: 1935  Age: 81 y.o. Sex: female       Date of Request: 8/20/2020  Date of Consult:  8/20/2020  Reason for Consult:  family support and education  Requesting Physician: Dr. Anna Garvin     Assessment/Plan:     Principal Diagnosis:    Dysphagia  R13.10    Additional Diagnoses:   · Altered Mental Status R41.82  · Debility, Unspecified  R53.81  · Frailty  R54  · Counseling, Encounter for Medical Advice  Z71.9  · Encounter for Palliative Care  Z51.5    Palliative Performance Scale (PPS):   10%    Medical Decision Making:   Reviewed and summarized labs and imaging. Pt opens eyes briefly but doesn't follow commands or answer questions. Chart review reveals issues with dysphagia. Pt would remain risk for aspiration with PEG tube. Attempted to reach pt daughter, Marc Lion, but got voice mail. Will continue to reach out and discuss goals, wishes. Addendum:  Spoke with daughter via phone. Discussed feeding options and explained differences between palliative care, hospice care, and comfort measures. Pt at this time would not meet hospice criteria however this could be reevaluated as condition declines given risks of aspiration. Regarding feeding options family did not seem to wish for PEG placement. I discussed comfort feeds allowing pt to eat whatever she likes. Daughter feels that pt would do more with family present which I agree with however current state of affairs with covid will restrict families ability to be present more often. I discussed palliative care at snf potential and daughter wishes to learn more about this option.       Will discuss findings with members of the interdisciplinary team.      Thank you for this referral.         Subjective:     History obtained from:  Chart    Chief Complaint: altered mental status  History of Present Illness:  81 y.o. female with medical history significant for DM, HTN who was recently hospitalized from 8/2-7 for UTI and metabolic encephalopathy. Tested for COVID due to placement and was positive. Weaned from 2L to RA. DC to [facility] on 8/7. Was reportedly tolerating diet and following commands. Brought back on 8/10 with encephalopathy, O2 sats with EMS 86% (but no hypoxia since admission). Remained non-verbal, no respiratory issues.  Neurology feel encephalopathy due to persistent delirium. Started on TFs via NGT  Remains lethargic and unable to reliably take PO    Advance Directive: No       Code Status:  DNR            Health Care Power of : pt daughter would be decision maker. Past Medical History:   Diagnosis Date    Benign hypertension     Controlled type 2 diabetes mellitus with diabetic autonomic neuropathy, with long-term current use of insulin (HCC)     Edema     Hyperlipidemia       Past Surgical History:   Procedure Laterality Date    HX AMPUTATION Left     Partial foot    HX CORONARY ARTERY BYPASS GRAFT  2005    HX KNEE ARTHROSCOPY Bilateral     HX PARTIAL HYSTERECTOMY       Family History   Problem Relation Age of Onset    Heart Disease Mother       Social History     Tobacco Use    Smoking status: Never Smoker    Smokeless tobacco: Never Used   Substance Use Topics    Alcohol use: No     Prior to Admission medications    Medication Sig Start Date End Date Taking? Authorizing Provider   famotidine (PEPCID) 20 mg tablet Take 1 Tab by mouth two (2) times a day. 8/7/20   Citlaly Foster MD   insulin detemir U-100 (Levemir Flexpen) 100 unit/mL (3 mL) inpn 10 Units by SubCUTAneous route daily. 8/7/20   Citlaly Foster MD   valsartan (DIOVAN) 40 mg tablet Take 1 Tab by mouth daily. 8/7/20   Citlaly Foster MD   diclofenac EC (VOLTAREN) 75 mg EC tablet Take 1 Tab by mouth two (2) times a day.  10/27/17   Carmen Wing MD   rosuvastatin (CRESTOR) 20 mg tablet TAKE 1 TABLET BY MOUTH EVERY DAY 9/25/17   Carmen Wing MD   BD INSULIN PEN NEEDLE UF MINI 31 gauge x 3/16\" ndle USE 1 NEEDLE DAILY 8/28/17   Calin Chavez MD   insulin lispro (HUMALOG) 100 unit/mL injection by SubCUTAneous route. Provider, Historical   TRAVATAN Z 0.004 % ophthalmic solution INSTILL 1 DROP IN BOTH EYES NIGHTLY 12/22/16   Provider, Historical   TRUE METRIX AIR GLUCOSE METER monitoring kit USE AS DIRECTED 1/9/17   Calin Chavez MD   BD SINGLE USE SWABS REGULAR padm  10/11/16   Provider, Historical   TRUE METRIX GLUCOSE TEST STRIP strip  10/11/16   Provider, Historical   TRUE METRIX LEVEL 1 soln  10/11/16   Provider, Historical   TRUEPLUS LANCETS 28 gauge misc  10/11/16   Provider, Historical       No Known Allergies     Review of systems unable to obtain due to mentation. Objective:     Visit Vitals  /58   Pulse 74   Temp 97.3 °F (36.3 °C)   Resp 20   Ht 5' 5\" (1.651 m)   Wt 201 lb 4.8 oz (91.3 kg)   SpO2 93%   BMI 33.50 kg/m²        Physical Exam:    General:   No acute distress. frail   Eyes:  Conjunctivae/corneas clear    Nose: Nares normal. Septum midline. Neck: Supple, symmetrical, trachea midline, no JVD   Lungs:   Coarse bilateral bs   Heart:  Regular rate and rhythm, no murmur    Abdomen:   Soft, non-tender, non-distended. Positive bowel sounds   Extremities: Normal, atraumatic, no cyanosis or edema   Skin: Skin color, texture, turgor normal. No rash or lesions.    Neurologic: lethargic   Psych: lethargic      Assessment:     Hospital Problems  Date Reviewed: 2/28/2017          Codes Class Noted POA    COVID-19 virus infection ICD-10-CM: U07.1  ICD-9-CM: 079.89  8/10/2020 Unknown        KAILEY (acute kidney injury) ([facility] ICD-10-CM: N17.9  ICD-9-CM: 584.9  8/2/2020 Yes        * (Principal) Acute metabolic encephalopathy WCY-10-CM: G93.41  ICD-9-CM: 348.31  8/2/2020 Unknown        Alzheimer's dementia with behavioral disturbance ([facility] (Chronic) ICD-10-CM: G30.9, F02.81  ICD-9-CM: 331.0, 294.11  8/2/2020 Yes        DNR (do not resuscitate) (Chronic) ICD-10-CM: Z66  ICD-9-CM: V49.86  8/2/2020 Yes        Diabetes mellitus, type II (HCC) (Chronic) ICD-10-CM: E11.9  ICD-9-CM: 250.00  Unknown Yes        Benign hypertension (Chronic) ICD-10-CM: I10  ICD-9-CM: 401.1  Unknown Yes              Signed By: Imer Teresa MD     August 20, 2020

## 2020-08-20 NOTE — PROGRESS NOTES
Problem: Falls - Risk of  Goal: *Absence of Falls  Description: Document Carlos Lowe Fall Risk and appropriate interventions in the flowsheet. Outcome: Progressing Towards Goal  Note: Fall Risk Interventions:  Mobility Interventions: Bed/chair exit alarm    Mentation Interventions: Reorient patient    Medication Interventions: Bed/chair exit alarm    Elimination Interventions: Toilet paper/wipes in reach              Problem: Isolation Precautions - Risk of Spread of Infection  Goal: Prevent transmission of infectious organism to others  Outcome: Progressing Towards Goal     Problem: Pressure Injury - Risk of  Goal: *Prevention of pressure injury  Description: Document Christopher Scale and appropriate interventions in the flowsheet.   Outcome: Progressing Towards Goal

## 2020-08-20 NOTE — PROGRESS NOTES
SBAR from [place], [facility]. Patient in stable condition with resps even/unlabored. NAD noted. Patient remains on room air. NGT with TF infusing. Safety measures noted. Will continue to monitor per policy. Droplet plus precautions intact. Appropriate PPE available and in use.

## 2020-08-20 NOTE — PROGRESS NOTES
Patient assessment complete and patient is alert but non-verbal. Patient breathing is even and unlabored. Patient appears to be in no pain or distress.  Frequent checks on patient and will continue to assess

## 2020-08-21 LAB
GLUCOSE BLD STRIP.AUTO-MCNC: 224 MG/DL (ref 65–100)
GLUCOSE BLD STRIP.AUTO-MCNC: 233 MG/DL (ref 65–100)
GLUCOSE BLD STRIP.AUTO-MCNC: 237 MG/DL (ref 65–100)
GLUCOSE BLD STRIP.AUTO-MCNC: 241 MG/DL (ref 65–100)
GLUCOSE BLD STRIP.AUTO-MCNC: 271 MG/DL (ref 65–100)
MAGNESIUM SERPL-MCNC: 2.3 MG/DL (ref 1.8–2.4)
PHOSPHATE SERPL-MCNC: 3.4 MG/DL (ref 2.3–3.7)

## 2020-08-21 PROCEDURE — 92526 ORAL FUNCTION THERAPY: CPT

## 2020-08-21 PROCEDURE — 82962 GLUCOSE BLOOD TEST: CPT

## 2020-08-21 PROCEDURE — 84100 ASSAY OF PHOSPHORUS: CPT

## 2020-08-21 PROCEDURE — 74011000250 HC RX REV CODE- 250: Performed by: INTERNAL MEDICINE

## 2020-08-21 PROCEDURE — 74011250636 HC RX REV CODE- 250/636: Performed by: INTERNAL MEDICINE

## 2020-08-21 PROCEDURE — 83735 ASSAY OF MAGNESIUM: CPT

## 2020-08-21 PROCEDURE — 65270000029 HC RM PRIVATE

## 2020-08-21 PROCEDURE — 74011636637 HC RX REV CODE- 636/637: Performed by: INTERNAL MEDICINE

## 2020-08-21 RX ADMIN — Medication 10 ML: at 06:31

## 2020-08-21 RX ADMIN — INSULIN LISPRO 4 UNITS: 100 INJECTION, SOLUTION INTRAVENOUS; SUBCUTANEOUS at 06:31

## 2020-08-21 RX ADMIN — INSULIN LISPRO 4 UNITS: 100 INJECTION, SOLUTION INTRAVENOUS; SUBCUTANEOUS at 00:48

## 2020-08-21 RX ADMIN — Medication 10 ML: at 14:27

## 2020-08-21 RX ADMIN — ENOXAPARIN SODIUM 40 MG: 40 INJECTION SUBCUTANEOUS at 08:54

## 2020-08-21 RX ADMIN — INSULIN LISPRO 4 UNITS: 100 INJECTION, SOLUTION INTRAVENOUS; SUBCUTANEOUS at 17:02

## 2020-08-21 RX ADMIN — Medication 10 ML: at 22:35

## 2020-08-21 RX ADMIN — FAMOTIDINE 20 MG: 10 INJECTION INTRAVENOUS at 08:54

## 2020-08-21 RX ADMIN — INSULIN LISPRO 6 UNITS: 100 INJECTION, SOLUTION INTRAVENOUS; SUBCUTANEOUS at 12:03

## 2020-08-21 NOTE — PROGRESS NOTES
's visit with family, Willam Friedman, in the hallway to convey care and concern.      Stan Burgos 69  Board Certified

## 2020-08-21 NOTE — PROGRESS NOTES
Assumed care at 21 . Alert and unable tp verbalize response at this time. NG tube with Glucema 1.5. Resting in bed at this time. RA. No s/sx of distress at this time. Droplet plus isolation due to positive COVID 19 test result and proper PPE worn. Call light within reach and is encouraged to call for assistance if needed. Will continue to monitor.

## 2020-08-21 NOTE — PROGRESS NOTES
Name: Catherine Luke MRN: [id number]  : 1935  Age:85 y.o.  female  Admit Date:  8/10/2020 LOS: 6      Hospitalist Progress Note    Catherine Luke is a 81 y.o. female with medical history significant for DM, HTN who was recently hospitalized from - for UTI and metabolic encephalopathy. Tested for COVID due to placement and was positive. Weaned from 2L to RA. DC to [facility] on . Was reportedly tolerating diet and following commands. Brought back on 8/10 with encephalopathy, O2 sats with EMS 86% (but no hypoxia since admission). Remained non-verbal, no respiratory issues.  Neurology feel encephalopathy due to persistent delirium. Started on TFs via NGT on . Extensive discussions have been had with the family by Hospitalist team regarding poor prognosis given her mental status with underlying dementia. Family is exploring hospice care but having a hard time accepting the idea of patient being without any form of nutrition. Palliative care is also consulted for help with the family's decision making. Subjective (20) : Patient is seen and examined at bedside. No acute events reported overnight by nursing staff. Patient appears to be more alert today. Opens eyes and tracks as soon as I entered the room. Follows very minimal simple commands. Appears to attempt to speak but not able to make out words. ROS: unable to obtain due to clinical condition.     Objective:    Patient Vitals for the past 24 hrs:   Temp Pulse Resp BP SpO2   20 0712 97.5 °F (36.4 °C) 72 19 144/59 98 %   20 0315 97.3 °F (36.3 °C) 67 18 140/61 95 %   20 2308 97.8 °F (36.6 °C) 84 18 137/66 98 %   20 1915 98.2 °F (36.8 °C) 73 18 150/59 95 %   20 1538 97.7 °F (36.5 °C) 68 18 134/65 95 %   20 1117 97.5 °F (36.4 °C) 70 20 155/58 94 %     Oxygen Therapy  O2 Sat (%): 98 % (20 0712)  Pulse via Oximetry: 53 beats per minute (08/10/20 1844)  O2 Device: Room air (20 0397)    Estimated body mass index is 33.5 kg/m² as calculated from the following:    Height as of this encounter: 5' 5\" (1.651 m). Weight as of this encounter: 91.3 kg (201 lb 4.8 oz). Intake/Output Summary (Last 24 hours) at 8/21/2020 1107  Last data filed at 8/21/2020 5941  Gross per 24 hour   Intake 35 ml   Output    Net 35 ml       *Note that automatically entered I/Os may not be accurate; dependent on patient compliance with collection and accurate  by techs. Physical Exam:  General:     Alert, awake, not in acute distress  Head:   normocephalic, atraumatic  Eyes, Ears, nose: PERRL. Normal conjunctiva. NGT in place. Neck:    supple, non-tender. Trachea midline. Lungs:   CTAB, no wheezing, rhonchi, rales  Cardiac:   RRR, Normal S1 and S2. Abdomen:   Soft, non distended, nontender, +BS  Extremities:   Warm, dry. No edema. Left transmetatarsal amputation. Skin:   No rashes, no jaundice  Neuro:  Does not follow commands.  Moves all extremities  Psychiatric:  No anxiety, calm, cooperative    Data Review:  I have reviewed all labs, meds, and studies from the last 24 hours:    Recent Results (from the past 24 hour(s))   GLUCOSE, POC    Collection Time: 08/20/20 11:26 AM   Result Value Ref Range    Glucose (POC) 236 (H) 65 - 100 mg/dL   GLUCOSE, POC    Collection Time: 08/20/20  4:07 PM   Result Value Ref Range    Glucose (POC) 247 (H) 65 - 100 mg/dL   GLUCOSE, POC    Collection Time: 08/21/20 12:27 AM   Result Value Ref Range    Glucose (POC) 233 (H) 65 - 100 mg/dL   PHOSPHORUS    Collection Time: 08/21/20  5:34 AM   Result Value Ref Range    Phosphorus 3.4 2.3 - 3.7 MG/DL   MAGNESIUM    Collection Time: 08/21/20  5:34 AM   Result Value Ref Range    Magnesium 2.3 1.8 - 2.4 mg/dL   GLUCOSE, POC    Collection Time: 08/21/20  6:01 AM   Result Value Ref Range    Glucose (POC) 241 (H) 65 - 100 mg/dL   GLUCOSE, POC    Collection Time: 08/21/20  9:37 AM   Result Value Ref Range    Glucose (POC) 224 (H) 65 - 100 mg/dL        All Micro Results     None          Current Meds:  Current Facility-Administered Medications   Medication Dose Route Frequency    insulin lispro (HUMALOG) injection   SubCUTAneous Q6H    famotidine (PF) (PEPCID) 20 mg in 0.9% sodium chloride 10 mL injection  20 mg IntraVENous Q24H    lip protectant (BLISTEX) ointment 1 Each  1 Each Topical PRN    NUTRITIONAL SUPPORT ELECTROLYTE PRN ORDERS   Does Not Apply PRN    dextrose 40% (GLUTOSE) oral gel 1 Tube  15 g Oral PRN    glucagon (GLUCAGEN) injection 1 mg  1 mg IntraMUSCular PRN    dextrose (D50W) injection syrg 12.5-25 g  25-50 mL IntraVENous PRN    sodium chloride (NS) flush 5-40 mL  5-40 mL IntraVENous Q8H    sodium chloride (NS) flush 5-40 mL  5-40 mL IntraVENous PRN    acetaminophen (TYLENOL) tablet 650 mg  650 mg Oral Q6H PRN    Or    acetaminophen (TYLENOL) suppository 650 mg  650 mg Rectal Q6H PRN    polyethylene glycol (MIRALAX) packet 17 g  17 g Oral DAILY PRN    promethazine (PHENERGAN) tablet 12.5 mg  12.5 mg Oral Q6H PRN    Or    ondansetron (ZOFRAN) injection 4 mg  4 mg IntraVENous Q6H PRN    enoxaparin (LOVENOX) injection 40 mg  40 mg SubCUTAneous DAILY         Other Studies:  Xr Chest Sngl V    Result Date: 8/4/2020  EXAMINATION: CHEST RADIOGRAPH 8/4/2020 4:53 PM ACCESSION NUMBER: [id number] INDICATION: Possible aspiration. COMPARISON: Radiograph 2 days prior TECHNIQUE: A single view of the chest was obtained. FINDINGS: Support Devices: None Osseous : No suspicious lesion. Cardiomediastinal Silhouette: Prior CABG. Size is unchanged. Lungs: Slight increased opacity in the bilateral bases. Lungs remain underinflated. Pleura: No definite evidence of pleural fluid or pneumothorax. Upper Abdomen: Normal     IMPRESSION: Slight increase in bibasilar opacities which may be atelectasis from underinflation. Pneumonia could have this appearance in the correct clinical context.      Xr Abd (kub)    Result Date: 8/13/2020  Exam: Single view abdomen. Indication: NG tube placement. Comparison: None. FINDINGS: Enteric tube with side-port and tip in the gastric body. Splenic artery calcifications are evident. Sternal wires are partially imaged. IMPRESSION: 1. Enteric tube side-port and tip in the gastric body. Ct Head Wo Cont    Result Date: 8/10/2020  NONCONTRAST HEAD CT CLINICAL HISTORY:  Decreased level of consciousness. TECHNIQUE:  Axial images were obtained with spiral technique. Radiation dose reduction was achieved using one or all of the following techniques: automated exposure control, weight-based dosing, iterative reconstruction. COMPARISON:  August 2, 2020. REPORT:   Standard noncontrast head CT demonstrates no definite intracranial mass effect, hemorrhage, or evidence of acute geographic infarction. Extensive white matter hypodensities are most consistent with small vessel ischemic disease. Prominent calcification in the basal ganglia and dentate nuclei may be physiologic but are again noted to suggest the possibility of hyperparathyroidism/pseudohyperparathyroidism. Mild atrophy is again noted. Orbits  and paranasal sinuses are clear where imaged. Bone windows demonstrate no definite fracture or destruction. IMPRESSION:     ATROPHY WITH EXTENSIVE SMALL VESSEL ISCHEMIC DISEASE BUT NO ACUTE INTRACRANIAL ABNORMALITY IDENTIFIED AT NONCONTRAST CT.     Ct Head Wo Cont    Result Date: 8/2/2020  Exam: CT head without contrast. Indication: Altered mental status. Comparison: None. Multiple axial images obtained through the brain without intravenous contrast. Radiation dose reduction techniques were used for this study: All CT scans performed at this facility use one or all of the following: Automated exposure control, adjustment of the mA and/or kVp according to patient's size, iterative reconstruction.  FINDINGS: Prominent chronic mineralization of the dentate nuclei of the cerebellum and of the basal ganglia. Diffuse cerebral atrophy with commensurate ex vacuo dilatation of the ventricles. Scattered periventricular and centrum semiovale white matter hypointensities most indicative of chronic ischemic white matter change. No evidence of intracranial mass, hemorrhage, or large territorial infarct. The basal cisterns are patent. No extra-axial fluid collection or mass effect. Left lens replacement. The paranasal sinuses are clear. The mastoid air cells and middle ears are clear. No significant osseous or extracranial soft tissue lesions. IMPRESSION: 1. No evidence of acute intracranial abnormality. 2. Prominent chronic mineralization of the basal ganglia and dentate nuclei likely senescent change however recommend correlation for possible hyperparathyroidism or pseudohyperparathyroidism. Xr Chest Port    Result Date: 8/10/2020  History: Hypoxia Exam: portable chest Comparison: 8/4/2020 Findings: There is a mild edema-like pattern to the lungs, similar to the findings seen previously. No change in the appearance of the mediastinal contour or osseous structures. Impressions: Stable portable chest     Xr Chest Port    Result Date: 8/2/2020  EXAM: Single view chest radiograph. INDICATION: Altered mental status. COMPARISON: None. FINDINGS: Hypoventilatory exam with bronchovascular crowding and bibasilar atelectasis. Cardiomegaly evident. Sternal wires appear intact. Mediastinal surgical clips evident. Atherosclerotic aortic arch. Advanced right shoulder joint degenerative changes. IMPRESSION: 1. Hypoventilatory exam with bronchovascular crowding and bibasilar atelectasis. 2. Cardiomegaly status post median sternotomy.    Assessment:    Active Hospital Problems    Diagnosis Date Noted    COVID-19 virus infection 08/10/2020    Acute metabolic encephalopathy 92/40/8139    KAILEY (acute kidney injury) ([facility] [address] 08/02/2020    Alzheimer's dementia with behavioral disturbance ([facility] [address] 08/02/2020    DNR (do not resuscitate) 08/02/2020    Benign hypertension     Diabetes mellitus, type II ([facility] [address]        Plan:    Acute Metabolic encephalopathy   Alzheimer's dementia with behavioral disturbance  Concerning of worsening dementia and delirium in the setting of recent hospitalization and with COVID infection. Head CT without acute intracranial abnormalities. UA without signs of UTI and CXR without signs of pna.   - Neurology signed off. Recs appreciated. - GI signed off. Recs appreciated. Not a candidate for PEG given poor prognosis. - Palliate and Hospice consulted. Family is leaning towards Palliative care option at the NH.   - Continue tube feeds for now via NGT  - SLP eval     Hypokalemia: resolved  KAILEY:resolved  Hypernatremia: resolved     Recent COVID-19 infection  No respiratory issues. Currently on RA with sats > 96% Completed Decadron during and after prior hospital stay  - Monitor     HTN: Hold home meds since normotensive      DM2 : continue with sliding scale     Diet:  DIET NPO  DIET TUBE FEEDING  DVT PPx: lovenox  Code: DNR  Dispo: Hospice vs Palliative care at [facility]  Estimated Discharge: 24-48hrs    Labs/Imaging Reviewed. Patient is Moderate risk due to current condition and comorbid conditions. Plan discussed with staff, family and are in agreement. Discussed with Letty over the phone. Answered all questions/concerns. Time Spent: Greater than 45 minutes was spent in reviewing charts, physical exam, discussion with patient, and answered any questions.      Signed By: Duncan Miller MD     August 21, 2020

## 2020-08-21 NOTE — PROGRESS NOTES
Pt is resting in bed at this time. Pt is on RA. Pt has NG tube in place. Glucerna 1.5 is running at 45mL with 35mL flushes. Pt has is nonverbal at this time. Pt has no s/sx of distress at this time. Pt has call light within reach. Report given to [facility].

## 2020-08-21 NOTE — PROGRESS NOTES
Late entry progress note for 8.20.20:  Chart reviewed. Conferred with staff regarding Ms. [employer] and family.     Stan Melendez 69  Board Certified

## 2020-08-21 NOTE — PROGRESS NOTES
Problem: Dysphagia (Adult)  Goal: *Speech Goal: (INSERT TEXT)  8/11/2020 1429 by DREW Kamara  Note: LTG: Patient will tolerate least restrictive diet without overt signs or symptoms of airway compromise. STG: Patient will tolerate po trials with speech therapy only without overt signs or symptoms of airway compromise. 8/11/2020 1429 by DREW Kamara  Outcome: Progressing Towards Goal   SPEECH LANGUAGE PATHOLOGY: DYSPHAGIA- Re-evaluation    NAME/AGE/GENDER: Dajuan Greene is a 81 y.o. female  DATE: 8/21/2020  PRIMARY DIAGNOSIS: Acute metabolic encephalopathy [R84.65]  COVID-19 virus infection [U07.1]      ICD-10: Treatment Diagnosis: R13.12 Dysphagia, Oropharyngeal Phase    RECOMMENDATIONS   DIET:    NPO   1-2 teaspoon water per hour for pleasure when alert and upright    MEDICATIONS: Non-oral     ASPIRATION PRECAUTIONS  · Slow rate of intake  · Small bites/sips     COMPENSATORY STRATEGIES/MODIFICATIONS  · None     EDUCATION:  · Recommendations discussed with Hospitalist  · Nursing  · Patient  · Family     CONTINUATION OF SKILLED SERVICES/MEDICAL NECESSITY:   Patient is expected to demonstrate progress in  swallow strength, swallow timeliness, swallow function, and swallow safety in order to  improve swallow safety, work toward diet advancement, and decrease aspiration risk.  Patient continues to require skilled intervention due to oral dysphagia. RECOMMENDATIONS for CONTINUED SPEECH THERAPY:   YES: Anticipate need for ongoing speech therapy during this hospitalization. ASSESSMENT   Patient presents with ongoing oropharyngeal dysphagia due to impaired mentation. Minimal acceptance of po trials with patient only accepting 1/2 teaspoons of water and single puree trial. Mild anterior spillage and moderate oral holding with thin by teaspoon. Severe oral holding with puree with patient requiring liquid rinse (thin by teaspoon) to swallow bolus.  Delayed coughing and throat clearing noted post swallow. Further trials deferred due to minimal intake and concerns for aspiration. Recommend NPO with non-oral medications. 1-2 teaspoons of water per hour for pleasure after oral care. Will follow for ongoing po trials as in line with goals of care. If family pursues comfort measures patient can consume po for pleasure; however, do not anticipate patient will meet nutritional needs orally due to poor oral acceptance. REHABILITATION POTENTIAL FOR STATED GOALS: Fair    PLAN    FREQUENCY/DURATION: Continue to follow patient 2 times a week for duration of hospital stay to address above goals. - Recommendations for next treatment session: Next treatment will address po trials    SUBJECTIVE   Patient alert upright in bed for assessment. Non-verbal and not following commands. Patient's granddaughter, who is a RN here, at bedside to assist with encouraging patient to accept po trials. History of Present Injury/Illness: Ms. Samia Ornelas  has a past medical history of Benign hypertension, Controlled type 2 diabetes mellitus with diabetic autonomic neuropathy, with long-term current use of insulin ([address] Edema, and Hyperlipidemia. . She also  has a past surgical history that includes hx coronary artery bypass graft (2005); hx knee arthroscopy (Bilateral); hx amputation (Left); and hx partial hysterectomy. Problem List:  (Impairments causing functional limitations):  1. Oropharyngeal dysphagia    Previous Dysphagia: YES Known to this service from recent admission in 07/20. She was discharged on puree diet/thin liquids with 1:1 assistance and alternating 1 bite/1 sip due to oral holding. No history of pharyngeal dysphagia. Per report, patient on regular diet prior to acute illness. Patient was re-admitted on 8/10/20 and last seen by speech therapy 8/12/20 with recommendations for patient to remain NPO due to impaired mentation.  Patient has not been alert for po trials; therefore, speech therapy signed off 8/18/20. Diet Prior to Evaluation: NPO    Orientation:   No verbal response    Pain: Pain Scale 1: FLACC  Pain Intensity 1: 0      OBJECTIVE   Oral Motor:   · COMMENTS: Unable to fully assess as patient is not following commands    Swallow evaluation:   Patient consumed trials of thin liquids via 1/2 teaspoon (only 5 trials provided) and puree trial x1. Mild anterior spillage with thin by teaspoon due to reduced labial seal. Moderate oral holding with patient requiring mod-max cues to swallow bolus. No attempt to maintain labial seal around edge of cup or straw; therefore, cup and straw sips deferred. Severe oral holding of single puree trial with no response max verbal cues to swallow bolus. Provided patient with water by teaspoon with swallow eventually palpated; however, patient with delayed coughing and throat clearing post swallow. Further trials deferred due to concern for aspiration. INTERDISCIPLINARY COLLABORATION: Registered Nurse and Physician  PRECAUTIONS/ALLERGIES: Patient has no known allergies. Tool Used: Dysphagia Outcome and Severity Scale (GERALD)    Score Comments   Normal Diet  [] 7 With no strategies or extra time needed   Functional Swallow  [] 6 May have mild oral or pharyngeal delay   Mild Dysphagia  [] 5 Which may require one diet consistency restricted    Mild-Moderate Dysphagia  [] 4 With 1-2 diet consistencies restricted   Moderate Dysphagia  [] 3 With 2 or more diet consistencies restricted   Moderate-Severe Dysphagia  [] 2 With partial PO strategies (trials with ST only)   Severe Dysphagia  [] 1 With inability to tolerate any PO safely      Score:  Initial: 2 Most Recent:  2(Date 08/21/20 )   Interpretation of Tool: The Dysphagia Outcome and Severity Scale (GERALD) is a simple, easy-to-use, 7-point scale developed to systematically rate the functional severity of dysphagia based on objective assessment and make recommendations for diet level, independence level, and type of nutrition. Current Medications:   No current facility-administered medications on file prior to encounter. Current Outpatient Medications on File Prior to Encounter   Medication Sig Dispense Refill    famotidine (PEPCID) 20 mg tablet Take 1 Tab by mouth two (2) times a day. 14 Tab 0    insulin detemir U-100 (Levemir Flexpen) 100 unit/mL (3 mL) inpn 10 Units by SubCUTAneous route daily. 7 Pen 1    valsartan (DIOVAN) 40 mg tablet Take 1 Tab by mouth daily. 30 Tab 1    diclofenac EC (VOLTAREN) 75 mg EC tablet Take 1 Tab by mouth two (2) times a day. 30 Tab 0    rosuvastatin (CRESTOR) 20 mg tablet TAKE 1 TABLET BY MOUTH EVERY DAY 90 Tab 2    BD INSULIN PEN NEEDLE UF MINI 31 gauge x 3/16\" ndle USE 1 NEEDLE DAILY 30 Pen Needle 12    insulin lispro (HUMALOG) 100 unit/mL injection by SubCUTAneous route.       TRAVATAN Z 0.004 % ophthalmic solution INSTILL 1 DROP IN BOTH EYES NIGHTLY  12    TRUE METRIX AIR GLUCOSE METER monitoring kit USE AS DIRECTED 1 Kit 0    BD SINGLE USE SWABS REGULAR padm       TRUE METRIX GLUCOSE TEST STRIP strip       TRUE METRIX LEVEL 1 soln       TRUEPLUS LANCETS 28 gauge misc          After treatment position/precautions:  · Upright in bed  · RN notified    Total Treatment Duration:   Time In: 8629  Time Out: [address], [address] [facility]

## 2020-08-21 NOTE — PROGRESS NOTES
Problem: Falls - Risk of  Goal: *Absence of Falls  Description: Document Phyllis [place] Fall Risk and appropriate interventions in the flowsheet.   Outcome: Progressing Towards Goal  Note: Fall Risk Interventions:  Mobility Interventions: Bed/chair exit alarm    Mentation Interventions: Reorient patient    Medication Interventions: Assess postural VS orthostatic hypotension, Bed/chair exit alarm    Elimination Interventions: Bed/chair exit alarm, Toileting schedule/hourly rounds

## 2020-08-21 NOTE — PROGRESS NOTES
Pt is resting comfortably bed at this time. Pt is on RA. Pt has NG tube in place and Glucerna 1.5 running at 45mL/hr and 35mL water flush. Pt is unable to veruablize pain. Pt has no s/s of acute distress at this time. Pt has safety measure in place. Pt has call light within reach. Will continue to monitor.

## 2020-08-21 NOTE — PROGRESS NOTES
Patient resting. No complaints during shift. Peg tub running. Spoke with MD about drooling on right side, been doing that on and off. Preparing to give report to oncoming nurse.

## 2020-08-22 LAB
GLUCOSE BLD STRIP.AUTO-MCNC: 188 MG/DL (ref 65–100)
GLUCOSE BLD STRIP.AUTO-MCNC: 191 MG/DL (ref 65–100)
GLUCOSE BLD STRIP.AUTO-MCNC: 229 MG/DL (ref 65–100)
GLUCOSE BLD STRIP.AUTO-MCNC: 271 MG/DL (ref 65–100)

## 2020-08-22 PROCEDURE — 74011250636 HC RX REV CODE- 250/636: Performed by: INTERNAL MEDICINE

## 2020-08-22 PROCEDURE — 74011000250 HC RX REV CODE- 250: Performed by: INTERNAL MEDICINE

## 2020-08-22 PROCEDURE — 65270000029 HC RM PRIVATE

## 2020-08-22 PROCEDURE — 82962 GLUCOSE BLOOD TEST: CPT

## 2020-08-22 PROCEDURE — 74011636637 HC RX REV CODE- 636/637: Performed by: INTERNAL MEDICINE

## 2020-08-22 RX ORDER — INSULIN LISPRO 100 [IU]/ML
2 INJECTION, SOLUTION INTRAVENOUS; SUBCUTANEOUS ONCE
Status: COMPLETED | OUTPATIENT
Start: 2020-08-22 | End: 2020-08-22

## 2020-08-22 RX ORDER — INSULIN LISPRO 100 [IU]/ML
3 INJECTION, SOLUTION INTRAVENOUS; SUBCUTANEOUS ONCE
Status: COMPLETED | OUTPATIENT
Start: 2020-08-22 | End: 2020-08-22

## 2020-08-22 RX ADMIN — FAMOTIDINE 20 MG: 10 INJECTION INTRAVENOUS at 22:02

## 2020-08-22 RX ADMIN — FAMOTIDINE 20 MG: 10 INJECTION INTRAVENOUS at 10:03

## 2020-08-22 RX ADMIN — INSULIN LISPRO 2 UNITS: 100 INJECTION, SOLUTION INTRAVENOUS; SUBCUTANEOUS at 17:21

## 2020-08-22 RX ADMIN — ENOXAPARIN SODIUM 40 MG: 40 INJECTION SUBCUTANEOUS at 10:03

## 2020-08-22 RX ADMIN — INSULIN LISPRO 2 UNITS: 100 INJECTION, SOLUTION INTRAVENOUS; SUBCUTANEOUS at 00:40

## 2020-08-22 RX ADMIN — INSULIN LISPRO 3 UNITS: 100 INJECTION, SOLUTION INTRAVENOUS; SUBCUTANEOUS at 12:24

## 2020-08-22 RX ADMIN — Medication 10 ML: at 22:03

## 2020-08-22 RX ADMIN — Medication 10 ML: at 06:14

## 2020-08-22 RX ADMIN — Medication 10 ML: at 15:34

## 2020-08-22 NOTE — PROGRESS NOTES
Pt NG tube has become clogged. Tube feed has been stopped at this time. Dr. Rita Parr has been notified. Will monitor for new orders.

## 2020-08-22 NOTE — PROGRESS NOTES
Dr. Derik Holbrook notified NG tube is clogged and unable to administer TF and medications via NG tube at this time. MD stated he would call family and ask what family wishes are. Will hold TF and medications at this time.

## 2020-08-22 NOTE — PROGRESS NOTES
MD notifed via [place] of blood sugar 271. Not receiving tube feed at this time. MD states to administer 3 units Humalog.

## 2020-08-22 NOTE — PROGRESS NOTES
Name: Denita Whalen MRN: [id number]  : 1935  Age:85 y.o.  female  Admit Date:  8/10/2020 LOS: 15      Hospitalist Progress Note    Denita Whalen is a 81 y.o. female with medical history significant for DM, HTN who was recently hospitalized from - for UTI and metabolic encephalopathy. Tested for COVID due to placement and was positive. Weaned from 2L to RA. DC to [facility] on . Was reportedly tolerating diet and following commands. Brought back on 8/10 with encephalopathy, O2 sats with EMS 86% (but no hypoxia since admission). Remained non-verbal, no respiratory issues.  Neurology feel encephalopathy due to persistent delirium. Started on TFs via NGT on . Extensive discussions have been had with the family by Hospitalist team regarding poor prognosis given her mental status with underlying dementia. Family has decided to not forward with PEG placement but having a hard time accepting the idea of patient being without any form of nutrition. Palliative care has been brought in to help with decision making for hospice, comfort care, palliative measures. Family is leaning toward palliative care service at [facility] and PO diet for pleasure with knowing the risks of aspirations. Subjective (20) : Patient is seen and examined at bedside. Patient was very alert and awake yesterday evening. She was smiling and following more commands and even laughing. No acute events reported overnight by nursing staff. Pt's NGTis clogged and will be removed. Spoke with Kathleen Rides and she is in agreement with not putting another NGT for now. Will give a trial off NGT and hopefully some po intake. ROS: unable to obtain due to clinical condition.     Objective:    Patient Vitals for the past 24 hrs:   Temp Pulse Resp BP SpO2   20 0906     95 %   20 0741 97.7 °F (36.5 °C) 80 20 117/60 95 %   20 0354 97.6 °F (36.4 °C) 70 18 164/70 94 %   20 2343 97.8 °F (36.6 °C) 75 19 151/65 96 %   08/21/20 1951 97.9 °F (36.6 °C) 75 18 144/57 98 %   08/21/20 1451 97.7 °F (36.5 °C) 74 19 136/76 94 %   08/21/20 1109 97.6 °F (36.4 °C) 75 19 130/54 96 %     Oxygen Therapy  O2 Sat (%): 95 % (08/22/20 0906)  Pulse via Oximetry: 53 beats per minute (08/10/20 1844)  O2 Device: Room air (08/20/20 0810)    Estimated body mass index is 33.93 kg/m² as calculated from the following:    Height as of this encounter: 5' 5\" (1.651 m). Weight as of this encounter: 92.5 kg (203 lb 14.8 oz). Intake/Output Summary (Last 24 hours) at 8/22/2020 1011  Last data filed at 8/21/2020 2032  Gross per 24 hour   Intake 70 ml   Output    Net 70 ml       *Note that automatically entered I/Os may not be accurate; dependent on patient compliance with collection and accurate  by techs. Physical Exam:  General:     Alert, awake, not in acute distress  Head:   normocephalic, atraumatic  Eyes, Ears, nose: PERRL. Normal conjunctiva. NGT in place. Neck:    supple, non-tender. Trachea midline. Lungs:   CTAB, no wheezing, rhonchi, rales  Cardiac:   RRR, Normal S1 and S2. Abdomen:   Soft, non distended, nontender, +BS  Extremities:   Warm, dry. No edema. Left transmetatarsal amputation. Skin:   No rashes, no jaundice  Neuro:  Does not follow commands.  Moves all extremities  Psychiatric:  No anxiety, calm, cooperative    Data Review:  I have reviewed all labs, meds, and studies from the last 24 hours:    Recent Results (from the past 24 hour(s))   GLUCOSE, POC    Collection Time: 08/21/20 11:48 AM   Result Value Ref Range    Glucose (POC) 271 (H) 65 - 100 mg/dL   GLUCOSE, POC    Collection Time: 08/21/20  3:52 PM   Result Value Ref Range    Glucose (POC) 237 (H) 65 - 100 mg/dL   GLUCOSE, POC    Collection Time: 08/21/20 11:52 PM   Result Value Ref Range    Glucose (POC) 188 (H) 65 - 100 mg/dL   GLUCOSE, POC    Collection Time: 08/22/20  5:46 AM   Result Value Ref Range    Glucose (POC) 191 (H) 65 - 100 mg/dL All Micro Results     None          Current Meds:  Current Facility-Administered Medications   Medication Dose Route Frequency    insulin lispro (HUMALOG) injection   SubCUTAneous Q6H    famotidine (PF) (PEPCID) 20 mg in 0.9% sodium chloride 10 mL injection  20 mg IntraVENous Q24H    lip protectant (BLISTEX) ointment 1 Each  1 Each Topical PRN    NUTRITIONAL SUPPORT ELECTROLYTE PRN ORDERS   Does Not Apply PRN    dextrose 40% (GLUTOSE) oral gel 1 Tube  15 g Oral PRN    glucagon (GLUCAGEN) injection 1 mg  1 mg IntraMUSCular PRN    dextrose (D50W) injection syrg 12.5-25 g  25-50 mL IntraVENous PRN    sodium chloride (NS) flush 5-40 mL  5-40 mL IntraVENous Q8H    sodium chloride (NS) flush 5-40 mL  5-40 mL IntraVENous PRN    acetaminophen (TYLENOL) tablet 650 mg  650 mg Oral Q6H PRN    Or    acetaminophen (TYLENOL) suppository 650 mg  650 mg Rectal Q6H PRN    polyethylene glycol (MIRALAX) packet 17 g  17 g Oral DAILY PRN    promethazine (PHENERGAN) tablet 12.5 mg  12.5 mg Oral Q6H PRN    Or    ondansetron (ZOFRAN) injection 4 mg  4 mg IntraVENous Q6H PRN    enoxaparin (LOVENOX) injection 40 mg  40 mg SubCUTAneous DAILY         Other Studies:  Xr Chest Sngl V    Result Date: 8/4/2020  EXAMINATION: CHEST RADIOGRAPH 8/4/2020 4:53 PM ACCESSION NUMBER: [id number] INDICATION: Possible aspiration. COMPARISON: Radiograph 2 days prior TECHNIQUE: A single view of the chest was obtained. FINDINGS: Support Devices: None Osseous : No suspicious lesion. Cardiomediastinal Silhouette: Prior CABG. Size is unchanged. Lungs: Slight increased opacity in the bilateral bases. Lungs remain underinflated. Pleura: No definite evidence of pleural fluid or pneumothorax. Upper Abdomen: Normal     IMPRESSION: Slight increase in bibasilar opacities which may be atelectasis from underinflation. Pneumonia could have this appearance in the correct clinical context.      Xr Abd (kub)    Result Date: 8/13/2020  Exam: Single view abdomen. Indication: NG tube placement. Comparison: None. FINDINGS: Enteric tube with side-port and tip in the gastric body. Splenic artery calcifications are evident. Sternal wires are partially imaged. IMPRESSION: 1. Enteric tube side-port and tip in the gastric body. Ct Head Wo Cont    Result Date: 8/10/2020  NONCONTRAST HEAD CT CLINICAL HISTORY:  Decreased level of consciousness. TECHNIQUE:  Axial images were obtained with spiral technique. Radiation dose reduction was achieved using one or all of the following techniques: automated exposure control, weight-based dosing, iterative reconstruction. COMPARISON:  August 2, 2020. REPORT:   Standard noncontrast head CT demonstrates no definite intracranial mass effect, hemorrhage, or evidence of acute geographic infarction. Extensive white matter hypodensities are most consistent with small vessel ischemic disease. Prominent calcification in the basal ganglia and dentate nuclei may be physiologic but are again noted to suggest the possibility of hyperparathyroidism/pseudohyperparathyroidism. Mild atrophy is again noted. Orbits  and paranasal sinuses are clear where imaged. Bone windows demonstrate no definite fracture or destruction. IMPRESSION:     ATROPHY WITH EXTENSIVE SMALL VESSEL ISCHEMIC DISEASE BUT NO ACUTE INTRACRANIAL ABNORMALITY IDENTIFIED AT NONCONTRAST CT.     Ct Head Wo Cont    Result Date: 8/2/2020  Exam: CT head without contrast. Indication: Altered mental status. Comparison: None. Multiple axial images obtained through the brain without intravenous contrast. Radiation dose reduction techniques were used for this study: All CT scans performed at this facility use one or all of the following: Automated exposure control, adjustment of the mA and/or kVp according to patient's size, iterative reconstruction. FINDINGS: Prominent chronic mineralization of the dentate nuclei of the cerebellum and of the basal ganglia.  Diffuse cerebral atrophy with commensurate ex vacuo dilatation of the ventricles. Scattered periventricular and centrum semiovale white matter hypointensities most indicative of chronic ischemic white matter change. No evidence of intracranial mass, hemorrhage, or large territorial infarct. The basal cisterns are patent. No extra-axial fluid collection or mass effect. Left lens replacement. The paranasal sinuses are clear. The mastoid air cells and middle ears are clear. No significant osseous or extracranial soft tissue lesions. IMPRESSION: 1. No evidence of acute intracranial abnormality. 2. Prominent chronic mineralization of the basal ganglia and dentate nuclei likely senescent change however recommend correlation for possible hyperparathyroidism or pseudohyperparathyroidism. Xr Chest Port    Result Date: 8/10/2020  History: Hypoxia Exam: portable chest Comparison: 8/4/2020 Findings: There is a mild edema-like pattern to the lungs, similar to the findings seen previously. No change in the appearance of the mediastinal contour or osseous structures. Impressions: Stable portable chest     Xr Chest Port    Result Date: 8/2/2020  EXAM: Single view chest radiograph. INDICATION: Altered mental status. COMPARISON: None. FINDINGS: Hypoventilatory exam with bronchovascular crowding and bibasilar atelectasis. Cardiomegaly evident. Sternal wires appear intact. Mediastinal surgical clips evident. Atherosclerotic aortic arch. Advanced right shoulder joint degenerative changes. IMPRESSION: 1. Hypoventilatory exam with bronchovascular crowding and bibasilar atelectasis. 2. Cardiomegaly status post median sternotomy.    Assessment:    Active Hospital Problems    Diagnosis Date Noted    COVID-19 virus infection 08/10/2020    Acute metabolic encephalopathy 01/29/4975    KAILEY (acute kidney injury) ([facility] [address] 08/02/2020    Alzheimer's dementia with behavioral disturbance ([facility] [address] 08/02/2020    DNR (do not resuscitate) 08/02/2020    Benign hypertension     Diabetes mellitus, type II ([facility] [address]        Plan:    Acute Metabolic encephalopathy   Alzheimer's dementia with behavioral disturbance  Concerning of worsening dementia and delirium in the setting of recent hospitalization and with COVID infection. Head CT without acute intracranial abnormalities. UA without signs of UTI and CXR without signs of pna. Neurology signed off. GI evaluated the patient and patient is not a candidate for PEG.   - Palliate and Hospice consulted. Family is leaning towards Palliative care option at the NH.   - no NGT for now as its clogged and family states not to put in another at this time. - SLP eval noted. Hypokalemia: resolved  KAILEY:resolved  Hypernatremia: resolved     Recent COVID-19 infection  No respiratory issues. Currently on RA with sats > 96% Completed Decadron during and after prior hospital stay  - Monitor     HTN: Hold home meds since normotensive      DM2 : continue with sliding scale     Diet:  DIET NPO  DIET TUBE FEEDING  DVT PPx: lovenox  Code: DNR  Dispo: Hospice vs Palliative care at [facility]  Estimated Discharge: 24-48hrs    Labs/Imaging Reviewed. Patient is Moderate risk due to current condition and comorbid conditions. Plan discussed with staff, family and are in agreement. Discussed with Letty over the phone. Answered all questions/concerns. Time Spent: Greater than 45 minutes was spent in reviewing charts, physical exam, discussion with patient, and answered any questions.      Signed By: Eugenie Mir MD     August 22, 2020

## 2020-08-22 NOTE — PROGRESS NOTES
Patient asleep in bed, NAD noted. Breathing room air. Bed in locked and low position. Bed alarm in place. Droplet plus precautions maintained. Preparing to give report to oncoming nurse.

## 2020-08-22 NOTE — PROGRESS NOTES
08/22/20 1128 Vitals Temp 98.3 °F (36.8 °C) Temp Source Axillary Pulse (Heart Rate) 82 Resp Rate 16  
O2 Sat (%) 96 % Level of Consciousness Responds to Voice /88 MAP (Calculated) 107 BP 1 Location Left arm BP 1 Method Automatic  
BP Patient Position At rest;Lying right side MD notified of increased MEWs score 3. Orders for 250cc Normal saline bolus received.

## 2020-08-22 NOTE — PROGRESS NOTES
Problem: Falls - Risk of  Goal: *Absence of Falls  Description: Document Jonh Gu Fall Risk and appropriate interventions in the flowsheet. Outcome: Progressing Towards Goal  Note: Fall Risk Interventions:  Mobility Interventions: Bed/chair exit alarm    Mentation Interventions: Reorient patient    Medication Interventions: Evaluate medications/consider consulting pharmacy    Elimination Interventions: Bed/chair exit alarm              Problem: Patient Education: Go to Patient Education Activity  Goal: Patient/Family Education  Outcome: Progressing Towards Goal     Problem: Airway Clearance - Ineffective  Goal: Achieve or maintain patent airway  Outcome: Progressing Towards Goal     Problem: Gas Exchange - Impaired  Goal: Absence of hypoxia  Outcome: Progressing Towards Goal  Goal: Promote optimal lung function  Outcome: Progressing Towards Goal     Problem:  Body Temperature -  Risk of, Imbalanced  Goal: Ability to maintain a body temperature within defined limits  Outcome: Progressing Towards Goal  Goal: Complications related to the disease process, condition or treatment will be avoided or minimized  Outcome: Progressing Towards Goal     Problem: Isolation Precautions - Risk of Spread of Infection  Goal: Prevent transmission of infectious organism to others  Outcome: Progressing Towards Goal     Problem: Risk for Fluid Volume Deficit  Goal: Maintain normal heart rhythm  Outcome: Progressing Towards Goal  Goal: Maintain absence of muscle cramping  Outcome: Progressing Towards Goal  Goal: Maintain normal serum potassium, sodium, calcium, phosphorus, and pH  Outcome: Progressing Towards Goal     Problem: Loneliness or Risk for Loneliness  Goal: Demonstrate positive use of time alone when socialization is not possible  Outcome: Progressing Towards Goal     Problem: Patient Education: Go to Patient Education Activity  Goal: Patient/Family Education  Outcome: Progressing Towards Goal     Problem: Patient Education: Qwilt to Patient Education Activity  Goal: Patient/Family Education  Outcome: Progressing Towards Goal     Problem: Pressure Injury - Risk of  Goal: *Prevention of pressure injury  Description: Document Christopher Scale and appropriate interventions in the flowsheet.   Outcome: Progressing Towards Goal  Note: Pressure Injury Interventions:  Sensory Interventions: Assess changes in LOC    Moisture Interventions: Absorbent underpads, Apply protective barrier, creams and emollients, Check for incontinence Q2 hours and as needed, Minimize layers    Activity Interventions: Pressure redistribution bed/mattress(bed type)    Mobility Interventions: Pressure redistribution bed/mattress (bed type)    Nutrition Interventions: Document food/fluid/supplement intake    Friction and Shear Interventions: Apply protective barrier, creams and emollients, Foam dressings/transparent film/skin sealants, HOB 30 degrees or less, Lift sheet, Minimize layers                Problem: Patient Education: Go to Patient Education Activity  Goal: Patient/Family Education  Outcome: Progressing Towards Goal

## 2020-08-22 NOTE — PROGRESS NOTES
Pt is sleeping in bed at this time. Pt NG tube is clogged and tube feed is stopped. Dr. Quiana Laurent is aware. Pt has no s/sx of distress at this time. Pt has safety measures in place. Will prepare report for oncoming nurse.

## 2020-08-22 NOTE — PROGRESS NOTES
SBAR received from Cirilo Valentino RN. Patient stable, resting in bed, in no apparent distress. Breathing on RA. Call light within reach. Droplet plus precautions maintained.

## 2020-08-23 LAB
GLUCOSE BLD STRIP.AUTO-MCNC: 144 MG/DL (ref 65–100)
GLUCOSE BLD STRIP.AUTO-MCNC: 181 MG/DL (ref 65–100)
GLUCOSE BLD STRIP.AUTO-MCNC: 183 MG/DL (ref 65–100)
GLUCOSE BLD STRIP.AUTO-MCNC: 208 MG/DL (ref 65–100)
GLUCOSE BLD STRIP.AUTO-MCNC: 228 MG/DL (ref 65–100)

## 2020-08-23 PROCEDURE — 92526 ORAL FUNCTION THERAPY: CPT

## 2020-08-23 PROCEDURE — 74011636637 HC RX REV CODE- 636/637: Performed by: INTERNAL MEDICINE

## 2020-08-23 PROCEDURE — 74011000250 HC RX REV CODE- 250: Performed by: INTERNAL MEDICINE

## 2020-08-23 PROCEDURE — 74011250636 HC RX REV CODE- 250/636: Performed by: INTERNAL MEDICINE

## 2020-08-23 PROCEDURE — 65270000029 HC RM PRIVATE

## 2020-08-23 PROCEDURE — 76937 US GUIDE VASCULAR ACCESS: CPT

## 2020-08-23 RX ADMIN — FAMOTIDINE 20 MG: 10 INJECTION INTRAVENOUS at 21:15

## 2020-08-23 RX ADMIN — INSULIN LISPRO 2 UNITS: 100 INJECTION, SOLUTION INTRAVENOUS; SUBCUTANEOUS at 23:55

## 2020-08-23 RX ADMIN — ENOXAPARIN SODIUM 40 MG: 40 INJECTION SUBCUTANEOUS at 09:46

## 2020-08-23 RX ADMIN — Medication 10 ML: at 06:31

## 2020-08-23 RX ADMIN — INSULIN LISPRO 2 UNITS: 100 INJECTION, SOLUTION INTRAVENOUS; SUBCUTANEOUS at 00:35

## 2020-08-23 RX ADMIN — Medication 10 ML: at 10:38

## 2020-08-23 RX ADMIN — Medication 10 ML: at 21:20

## 2020-08-23 NOTE — PROGRESS NOTES
Pt is sleeping in bed at this time. Pt is on RA. Pt is no responsive to stimulus at this time. Pt has s/sx of distress at this time. Pt has safety measures in place. Will continue to monitor.

## 2020-08-23 NOTE — PROGRESS NOTES
Name: Hiren Prabhakar MRN: [id number]  : 1935  Age:85 y.o.  female  Admit Date:  8/10/2020 LOS: 15      Hospitalist Progress Note    Hiren Prabhakar is a 81 y.o. female with medical history significant for DM, HTN who was recently hospitalized from - for UTI and metabolic encephalopathy. Tested for COVID due to placement and was positive. Weaned from 2L to RA. DC to [facility] on . Was reportedly tolerating diet and following commands. Brought back on 8/10 with encephalopathy, O2 sats with EMS 86% (but no hypoxia since admission). Remained non-verbal, no respiratory issues.  Neurology feel encephalopathy due to persistent delirium. Started on TFs via NGT on . Extensive discussions have been had with the family by Hospitalist team regarding poor prognosis given her mental status with underlying dementia. Family has decided to not forward with PEG placement but having a hard time accepting the idea of patient being without any form of nutrition. Palliative care has been brought in to help with decision making for hospice, comfort care, palliative measures. Family is leaning toward palliative care service at [facility] and PO diet for pleasure with knowing the risks of aspirations. Subjective (20) : Patient is seen and examined at bedside. Alert and awake. Tracks with eyes and follows some simple commands. Smiles and giggles. Appears to attempt to speak but not making any words. ROS: unable to obtain due to clinical condition.     Objective:    Patient Vitals for the past 24 hrs:   Temp Pulse Resp BP SpO2   20 1201 98.3 °F (36.8 °C)  20 113/79    20 0745 98.1 °F (36.7 °C) 98 20 162/76 94 %   20 0343 98.1 °F (36.7 °C) 76 18 110/53 94 %   20 2306 98.4 °F (36.9 °C) 74 19 98/51 95 %   20 1924 98.2 °F (36.8 °C) 72 18 135/48 97 %   20 1632 98.4 °F (36.9 °C) (!) 114 14 130/77      Oxygen Therapy  O2 Sat (%): 94 % (20 0745)  Pulse via Oximetry: 53 beats per minute (08/10/20 1844)  O2 Device: Room air (08/20/20 0810)    Estimated body mass index is 33.68 kg/m² as calculated from the following:    Height as of this encounter: 5' 5\" (1.651 m). Weight as of this encounter: 91.8 kg (202 lb 6.1 oz). No intake or output data in the 24 hours ending 08/23/20 1242    *Note that automatically entered I/Os may not be accurate; dependent on patient compliance with collection and accurate  by techs. Physical Exam:  General:     Alert, awake, not in acute distress  Head:   normocephalic, atraumatic  Eyes, Ears, nose: PERRL. Normal conjunctiva. Neck:    supple, non-tender. Trachea midline. Lungs:   CTAB, no wheezing, rhonchi, rales  Cardiac:   RRR, Normal S1 and S2. Abdomen:   Soft, non distended, nontender, +BS  Extremities:   Warm, dry. No edema. Left transmetatarsal amputation. Skin:   No rashes, no jaundice  Neuro:  Does not follow commands.  Moves all extremities  Psychiatric:  No anxiety, calm, cooperative    Data Review:  I have reviewed all labs, meds, and studies from the last 24 hours:    Recent Results (from the past 24 hour(s))   GLUCOSE, POC    Collection Time: 08/22/20  4:21 PM   Result Value Ref Range    Glucose (POC) 229 (H) 65 - 100 mg/dL   GLUCOSE, POC    Collection Time: 08/22/20 11:51 PM   Result Value Ref Range    Glucose (POC) 183 (H) 65 - 100 mg/dL   GLUCOSE, POC    Collection Time: 08/23/20  6:08 AM   Result Value Ref Range    Glucose (POC) 144 (H) 65 - 100 mg/dL   GLUCOSE, POC    Collection Time: 08/23/20 11:41 AM   Result Value Ref Range    Glucose (POC) 228 (H) 65 - 100 mg/dL        All Micro Results     None          Current Meds:  Current Facility-Administered Medications   Medication Dose Route Frequency    famotidine (PF) (PEPCID) 20 mg in 0.9% sodium chloride 10 mL injection  20 mg IntraVENous Q12H    insulin lispro (HUMALOG) injection   SubCUTAneous Q6H    lip protectant (BLISTEX) ointment 1 Each  1 Each Topical PRN    NUTRITIONAL SUPPORT ELECTROLYTE PRN ORDERS   Does Not Apply PRN    dextrose 40% (GLUTOSE) oral gel 1 Tube  15 g Oral PRN    glucagon (GLUCAGEN) injection 1 mg  1 mg IntraMUSCular PRN    dextrose (D50W) injection syrg 12.5-25 g  25-50 mL IntraVENous PRN    sodium chloride (NS) flush 5-40 mL  5-40 mL IntraVENous Q8H    sodium chloride (NS) flush 5-40 mL  5-40 mL IntraVENous PRN    acetaminophen (TYLENOL) tablet 650 mg  650 mg Oral Q6H PRN    Or    acetaminophen (TYLENOL) suppository 650 mg  650 mg Rectal Q6H PRN    polyethylene glycol (MIRALAX) packet 17 g  17 g Oral DAILY PRN    promethazine (PHENERGAN) tablet 12.5 mg  12.5 mg Oral Q6H PRN    Or    ondansetron (ZOFRAN) injection 4 mg  4 mg IntraVENous Q6H PRN    enoxaparin (LOVENOX) injection 40 mg  40 mg SubCUTAneous DAILY         Other Studies:  Xr Chest Sngl V    Result Date: 8/4/2020  EXAMINATION: CHEST RADIOGRAPH 8/4/2020 4:53 PM ACCESSION NUMBER: [id number] INDICATION: Possible aspiration. COMPARISON: Radiograph 2 days prior TECHNIQUE: A single view of the chest was obtained. FINDINGS: Support Devices: None Osseous : No suspicious lesion. Cardiomediastinal Silhouette: Prior CABG. Size is unchanged. Lungs: Slight increased opacity in the bilateral bases. Lungs remain underinflated. Pleura: No definite evidence of pleural fluid or pneumothorax. Upper Abdomen: Normal     IMPRESSION: Slight increase in bibasilar opacities which may be atelectasis from underinflation. Pneumonia could have this appearance in the correct clinical context. Xr Abd (kub)    Result Date: 8/13/2020  Exam: Single view abdomen. Indication: NG tube placement. Comparison: None. FINDINGS: Enteric tube with side-port and tip in the gastric body. Splenic artery calcifications are evident. Sternal wires are partially imaged. IMPRESSION: 1. Enteric tube side-port and tip in the gastric body.     Ct Head Wo Cont    Result Date: 8/10/2020  NONCONTRAST HEAD CT CLINICAL HISTORY:  Decreased level of consciousness. TECHNIQUE:  Axial images were obtained with spiral technique. Radiation dose reduction was achieved using one or all of the following techniques: automated exposure control, weight-based dosing, iterative reconstruction. COMPARISON:  August 2, 2020. REPORT:   Standard noncontrast head CT demonstrates no definite intracranial mass effect, hemorrhage, or evidence of acute geographic infarction. Extensive white matter hypodensities are most consistent with small vessel ischemic disease. Prominent calcification in the basal ganglia and dentate nuclei may be physiologic but are again noted to suggest the possibility of hyperparathyroidism/pseudohyperparathyroidism. Mild atrophy is again noted. Orbits  and paranasal sinuses are clear where imaged. Bone windows demonstrate no definite fracture or destruction. IMPRESSION:     ATROPHY WITH EXTENSIVE SMALL VESSEL ISCHEMIC DISEASE BUT NO ACUTE INTRACRANIAL ABNORMALITY IDENTIFIED AT NONCONTRAST CT.     Ct Head Wo Cont    Result Date: 8/2/2020  Exam: CT head without contrast. Indication: Altered mental status. Comparison: None. Multiple axial images obtained through the brain without intravenous contrast. Radiation dose reduction techniques were used for this study: All CT scans performed at this facility use one or all of the following: Automated exposure control, adjustment of the mA and/or kVp according to patient's size, iterative reconstruction. FINDINGS: Prominent chronic mineralization of the dentate nuclei of the cerebellum and of the basal ganglia. Diffuse cerebral atrophy with commensurate ex vacuo dilatation of the ventricles. Scattered periventricular and centrum semiovale white matter hypointensities most indicative of chronic ischemic white matter change. No evidence of intracranial mass, hemorrhage, or large territorial infarct. The basal cisterns are patent.  No extra-axial fluid collection or mass effect. Left lens replacement. The paranasal sinuses are clear. The mastoid air cells and middle ears are clear. No significant osseous or extracranial soft tissue lesions. IMPRESSION: 1. No evidence of acute intracranial abnormality. 2. Prominent chronic mineralization of the basal ganglia and dentate nuclei likely senescent change however recommend correlation for possible hyperparathyroidism or pseudohyperparathyroidism. Xr Chest Port    Result Date: 8/10/2020  History: Hypoxia Exam: portable chest Comparison: 8/4/2020 Findings: There is a mild edema-like pattern to the lungs, similar to the findings seen previously. No change in the appearance of the mediastinal contour or osseous structures. Impressions: Stable portable chest     Xr Chest Port    Result Date: 8/2/2020  EXAM: Single view chest radiograph. INDICATION: Altered mental status. COMPARISON: None. FINDINGS: Hypoventilatory exam with bronchovascular crowding and bibasilar atelectasis. Cardiomegaly evident. Sternal wires appear intact. Mediastinal surgical clips evident. Atherosclerotic aortic arch. Advanced right shoulder joint degenerative changes. IMPRESSION: 1. Hypoventilatory exam with bronchovascular crowding and bibasilar atelectasis. 2. Cardiomegaly status post median sternotomy. Assessment:    Active Hospital Problems    Diagnosis Date Noted    COVID-19 virus infection 08/10/2020    Acute metabolic encephalopathy 84/50/4644    KAILEY (acute kidney injury) ([address] 08/02/2020    Alzheimer's dementia with behavioral disturbance ([address] 08/02/2020    DNR (do not resuscitate) 08/02/2020    Benign hypertension     Diabetes mellitus, type II ([address]        Plan:    Acute Metabolic encephalopathy   Alzheimer's dementia with behavioral disturbance  Concerning of worsening dementia and delirium in the setting of recent hospitalization and with COVID infection. Head CT without acute intracranial abnormalities.   UA without signs of UTI and CXR without signs of pna. Neurology signed off. GI evaluated the patient and patient is not a candidate for PEG.   - Palliate and Hospice consulted. Family is leaning towards Palliative care option at the NH.   - NGT removed due to being clogged on 8/22. Will see if she can have some PO for pleasure. SLP consulted. Hypokalemia: resolved  KAILEY:resolved  Hypernatremia: resolved     Recent COVID-19 infection  No respiratory issues. Currently on RA with sats > 96% Completed Decadron during and after prior hospital stay  - Monitor     HTN: Hold home meds since normotensive      DM2 : continue with sliding scale     Diet:  DIET NPO  DIET TUBE FEEDING  DVT PPx: lovenox  Code: DNR  Dispo: Hospice vs Palliative care at [facility] [place]  Estimated Discharge:  Pending discharge placement. Medically clear for dc. Dispo is difficult as patient cannot go back to NH in currently condition. Family is exploring palliative care option at the facility. Labs/Imaging Reviewed. Patient is Moderate risk due to current condition and comorbid conditions. Plan discussed with staff, family and are in agreement. Discussed with Letty over the phone. Answered all questions/concerns. Time Spent: Greater than 45 minutes was spent in reviewing charts, physical exam, discussion with patient, and answered any questions.      Signed By: Danna Jean MD     August 23, 2020

## 2020-08-23 NOTE — PROGRESS NOTES
SBAR received from Kellen Chester RN. Patient stable, sleeping in bed, in no apparent distress. Pt breathing RA. Bed in locked and low position. Call light within reach. Droplet plus precautions maintained.

## 2020-08-23 NOTE — PROGRESS NOTES
MD notified of pt's blood glucose 208 at 1611. Received written order to hold Humalog. See MAR. Will continue to monitor.

## 2020-08-23 NOTE — PROGRESS NOTES
Patient's daughter, Sierra Macias, present in room wearing appropriate PPE for droplet plus precautions. Sierra Macias has been visiting for about an hour at this time.

## 2020-08-23 NOTE — PROGRESS NOTES
Pt is sleeping in bed at this time. Pt is on RA. Pt has no s/sx of distress at this time. Pt has safety measure in place. Report given to [facility].

## 2020-08-23 NOTE — PROGRESS NOTES
Problem: Dysphagia (Adult)  Goal: *Speech Goal: (INSERT TEXT)  8/11/2020 1429 by DREW Diaz  Note: LTG: Patient will tolerate least restrictive diet without overt signs or symptoms of airway compromise. STG: Patient will tolerate po trials with speech therapy only without overt signs or symptoms of airway compromise. 8/11/2020 1429 by DREW Diaz  Outcome: Progressing Towards Goal   SPEECH LANGUAGE PATHOLOGY: DYSPHAGIA- Re-evaluation    NAME/AGE/GENDER: Reid Rodriguez is a 81 y.o. female  DATE: 8/23/2020  PRIMARY DIAGNOSIS: Acute metabolic encephalopathy [L93.87]  COVID-19 virus infection [U07.1]      ICD-10: Treatment Diagnosis: R13.12 Dysphagia, Oropharyngeal Phase    RECOMMENDATIONS   DIET:    NPO   1-2 teaspoon water per hour for pleasure when alert and upright    MEDICATIONS: Non-oral     ASPIRATION PRECAUTIONS  · Slow rate of intake  · Small bites/sips     COMPENSATORY STRATEGIES/MODIFICATIONS  · None     EDUCATION:  Recommendations discussed with Hospitalist  · Nursing  · Patient  · Family     CONTINUATION OF SKILLED SERVICES/MEDICAL NECESSITY:   Patient is expected to demonstrate progress in  swallow strength, swallow timeliness, swallow function, and swallow safety in order to  improve swallow safety, work toward diet advancement, and decrease aspiration risk.  Patient continues to require skilled intervention due to oral dysphagia. RECOMMENDATIONS for CONTINUED SPEECH THERAPY:   YES: Anticipate need for ongoing speech therapy during this hospitalization. ASSESSMENT   Call received to reassess swallow function. Minimal change in mentation and minimal change in swallow function compared to prior assessments. Patient presents with ongoing oropharyngeal dysphagia, likely due to impaired mentation. She does not seem aware of oral stimulus resulting in minimal response to presentation of thin liquid by cup.  Attempted straw pipette and patient orally holding in mouth and then let leak out anteriorly. She would not accept water by spoon. NO further trials or consistencies attempted. Mild cough at conclusion. Recommend NPO with non-oral medications. 1-2 teaspoons of water per hour for pleasure after oral care only if interactive with accepting. Will follow for ongoing po trials as in line with goals of care. If family pursues comfort measures patient can consume po for pleasure; however, do not anticipate patient will meet nutritional needs orally due to poor oral acceptance. REHABILITATION POTENTIAL FOR STATED GOALS: Fair    PLAN    FREQUENCY/DURATION: Continue to follow patient 2 times a week for duration of hospital stay to address above goals. - Recommendations for next treatment session: Next treatment will address po trials    SUBJECTIVE   Patient drowsy but with eyes open. However, she does not appear to be focusing. Daughter and granddaughter present. Patient non-verbal and not following commands. History of Present Injury/Illness: Ms. [employer]  has a past medical history of Benign hypertension, Controlled type 2 diabetes mellitus with diabetic autonomic neuropathy, with long-term current use of insulin ([address] Edema, and Hyperlipidemia. . She also  has a past surgical history that includes hx coronary artery bypass graft (2005); hx knee arthroscopy (Bilateral); hx amputation (Left); and hx partial hysterectomy. Problem List:  (Impairments causing functional limitations):  1. Oropharyngeal dysphagia    Previous Dysphagia: YES Known to this service from recent admission in 07/20. She was discharged on puree diet/thin liquids with 1:1 assistance and alternating 1 bite/1 sip due to oral holding. No history of pharyngeal dysphagia. Per report, patient on regular diet prior to acute illness. Patient was re-admitted on 8/10/20 and last seen by speech therapy 8/12/20 with recommendations for patient to remain NPO due to impaired mentation.  Patient has not been alert for po trials; therefore, speech therapy signed off 8/18/20. Reassessed 8/21/20 with recommendations for continued NPO. Diet Prior to Evaluation: NPO    Orientation:   No verbal response    Pain: Pain Scale 1: FLACC  Pain Intensity 1: 0      OBJECTIVE   Oral Motor:   · COMMENTS: Unable to fully assess as patient is not following commands  · Patient was edentulous. Granddaughter attempted to place dentures, but patient would not respond to oral stimulus and would not attempt to open her mouth wide enough to accept dentures. Swallow evaluation:   Attempted trials of water with various modes of presentation with no swallow elicited while bolus was in mouth. Mild cough after session ended. INTERDISCIPLINARY COLLABORATION: Registered Nurse and Physician  PRECAUTIONS/ALLERGIES: Patient has no known allergies. Tool Used: Dysphagia Outcome and Severity Scale (GERALD)    Score Comments   Normal Diet  [] 7 With no strategies or extra time needed   Functional Swallow  [] 6 May have mild oral or pharyngeal delay   Mild Dysphagia  [] 5 Which may require one diet consistency restricted    Mild-Moderate Dysphagia  [] 4 With 1-2 diet consistencies restricted   Moderate Dysphagia  [] 3 With 2 or more diet consistencies restricted   Moderate-Severe Dysphagia  [] 2 With partial PO strategies (trials with ST only)   Severe Dysphagia  [] 1 With inability to tolerate any PO safely      Score:  Initial: 2 Most Recent: 2(Date 08/23/20 )   Interpretation of Tool: The Dysphagia Outcome and Severity Scale (GERALD) is a simple, easy-to-use, 7-point scale developed to systematically rate the functional severity of dysphagia based on objective assessment and make recommendations for diet level, independence level, and type of nutrition. Current Medications:   No current facility-administered medications on file prior to encounter.       Current Outpatient Medications on File Prior to Encounter   Medication Sig Dispense Refill    famotidine (PEPCID) 20 mg tablet Take 1 Tab by mouth two (2) times a day. 14 Tab 0    insulin detemir U-100 (Levemir Flexpen) 100 unit/mL (3 mL) inpn 10 Units by SubCUTAneous route daily. 7 Pen 1    valsartan (DIOVAN) 40 mg tablet Take 1 Tab by mouth daily. 30 Tab 1    diclofenac EC (VOLTAREN) 75 mg EC tablet Take 1 Tab by mouth two (2) times a day. 30 Tab 0    rosuvastatin (CRESTOR) 20 mg tablet TAKE 1 TABLET BY MOUTH EVERY DAY 90 Tab 2    BD INSULIN PEN NEEDLE UF MINI 31 gauge x 3/16\" ndle USE 1 NEEDLE DAILY 30 Pen Needle 12    insulin lispro (HUMALOG) 100 unit/mL injection by SubCUTAneous route.       TRAVATAN Z 0.004 % ophthalmic solution INSTILL 1 DROP IN BOTH EYES NIGHTLY  12    TRUE METRIX AIR GLUCOSE METER monitoring kit USE AS DIRECTED 1 Kit 0    BD SINGLE USE SWABS REGULAR padm       TRUE METRIX GLUCOSE TEST STRIP strip       TRUE METRIX LEVEL 1 soln       TRUEPLUS LANCETS 28 gauge misc          After treatment position/precautions:  · Upright in bed  · RN notified  · Family at bedside    Total Treatment Duration:   Time In: 1420  Time Out: Don Yuen, CCC-SLP

## 2020-08-23 NOTE — PROGRESS NOTES
Tara Awan, , called to request speech to do a re-eval of patient today. 1000 Patient IV access lost.  Patient is a hardstick. Dr. Derik Holbrook notified IV access lost.  MD suggest PICC team to place IV. Azra Dutton, , called PICC team to request a peripheral IV placement.

## 2020-08-23 NOTE — PROGRESS NOTES
End of Shift Note    Patient lying in bed resting. NPO status maintained. Breathing RA. NAD noted. Bed in locked and low position. Bed alarm in place. Droplet plus precautions maintained. Preparing to give report to oncoming nurse.

## 2020-08-23 NOTE — PROGRESS NOTES
20 gauge 1.75 inch IV established in left upper arm using ultrasound guidance. Patient tolerated well.

## 2020-08-23 NOTE — PROGRESS NOTES
Pt is resting in bed. Respirations present on RA. No signs of distress. No needs expressed. Bed low and locked. Call light within reach.  Report received from [facility]

## 2020-08-24 LAB
GLUCOSE BLD STRIP.AUTO-MCNC: 141 MG/DL (ref 65–100)
GLUCOSE BLD STRIP.AUTO-MCNC: 158 MG/DL (ref 65–100)
GLUCOSE BLD STRIP.AUTO-MCNC: 173 MG/DL (ref 65–100)
MAGNESIUM SERPL-MCNC: 2.3 MG/DL (ref 1.8–2.4)
PHOSPHATE SERPL-MCNC: 2.9 MG/DL (ref 2.3–3.7)

## 2020-08-24 PROCEDURE — 82962 GLUCOSE BLOOD TEST: CPT

## 2020-08-24 PROCEDURE — 36415 COLL VENOUS BLD VENIPUNCTURE: CPT

## 2020-08-24 PROCEDURE — 83735 ASSAY OF MAGNESIUM: CPT

## 2020-08-24 PROCEDURE — 97535 SELF CARE MNGMENT TRAINING: CPT

## 2020-08-24 PROCEDURE — 65270000029 HC RM PRIVATE

## 2020-08-24 PROCEDURE — 74011250636 HC RX REV CODE- 250/636: Performed by: INTERNAL MEDICINE

## 2020-08-24 PROCEDURE — 84100 ASSAY OF PHOSPHORUS: CPT

## 2020-08-24 PROCEDURE — 74011000250 HC RX REV CODE- 250: Performed by: INTERNAL MEDICINE

## 2020-08-24 PROCEDURE — 97530 THERAPEUTIC ACTIVITIES: CPT

## 2020-08-24 PROCEDURE — 92526 ORAL FUNCTION THERAPY: CPT

## 2020-08-24 RX ORDER — SODIUM CHLORIDE 9 MG/ML
50 INJECTION, SOLUTION INTRAVENOUS CONTINUOUS
Status: DISPENSED | OUTPATIENT
Start: 2020-08-24 | End: 2020-08-25

## 2020-08-24 RX ADMIN — Medication 10 ML: at 12:02

## 2020-08-24 RX ADMIN — Medication 10 ML: at 06:17

## 2020-08-24 RX ADMIN — SODIUM CHLORIDE 50 ML/HR: 900 INJECTION, SOLUTION INTRAVENOUS at 14:23

## 2020-08-24 RX ADMIN — ENOXAPARIN SODIUM 40 MG: 40 INJECTION SUBCUTANEOUS at 08:45

## 2020-08-24 RX ADMIN — Medication 10 ML: at 21:47

## 2020-08-24 RX ADMIN — FAMOTIDINE 20 MG: 10 INJECTION INTRAVENOUS at 20:26

## 2020-08-24 RX ADMIN — FAMOTIDINE 20 MG: 10 INJECTION INTRAVENOUS at 08:45

## 2020-08-24 NOTE — PROGRESS NOTES
Name: Kim Ghotra MRN: [id number]  : 1935  Age:85 y.o.  female  Admit Date:  8/10/2020 LOS: 15      Hospitalist Progress Note    Kim Ghotra is a 81 y.o. female with medical history significant for DM, HTN who was recently hospitalized from - for UTI and metabolic encephalopathy. Tested for COVID due to placement and was positive. Weaned from 2L to RA. DC to [facility] on . Was reportedly tolerating diet and following commands. Brought back on 8/10 with encephalopathy, O2 sats with EMS 86% (but no hypoxia since admission). Remained non-verbal, no respiratory issues.  Neurology feel encephalopathy due to persistent delirium. Started on TFs via NGT on . Extensive discussions have been had with the family by Hospitalist team regarding poor prognosis given her mental status with underlying dementia. Family has decided to not forward with PEG placement but having a hard time accepting the idea of patient being without any form of nutrition. Palliative care has been brought in to help with decision making for hospice, comfort care, palliative measures. Family is leaning toward palliative care service at [facility] and PO diet for pleasure with knowing the risks of aspirations. Subjective (20) :  Patient is seen and examined at bedside. No acute events overnight. Remains hemodynamically stable and on room air. ROS: unable to obtain due to clinical condition.     Objective:    Patient Vitals for the past 24 hrs:   Temp Pulse Resp BP SpO2   20 1134 97.8 °F (36.6 °C) 72 19 157/77 91 %   20 0739 97.9 °F (36.6 °C) 65 17 120/51 97 %   20 0342 97.7 °F (36.5 °C) (!) 56 16 158/69 90 %   20 2342 97.7 °F (36.5 °C) 96 16 164/68 90 %   20 1941 98.6 °F (37 °C) 73 16 144/53 95 %   20 1627 98.4 °F (36.9 °C) 78 16 104/55 97 %     Oxygen Therapy  O2 Sat (%): 91 % (20 1134)  Pulse via Oximetry: 53 beats per minute (08/10/20 1844)  O2 Device: Room air (08/23/20 1930)    Estimated body mass index is 33.68 kg/m² as calculated from the following:    Height as of this encounter: 5' 5\" (1.651 m). Weight as of this encounter: 91.8 kg (202 lb 6.1 oz). Intake/Output Summary (Last 24 hours) at 8/24/2020 1323  Last data filed at 8/24/2020 2187  Gross per 24 hour   Intake 0 ml   Output    Net 0 ml       *Note that automatically entered I/Os may not be accurate; dependent on patient compliance with collection and accurate  by techs. Physical Exam:  General:     Alert, awake, not in acute distress  Head:   normocephalic, atraumatic  Eyes, Ears, nose: PERRL. Normal conjunctiva. Neck:    supple, non-tender. Trachea midline. Lungs:   CTAB, no wheezing, rhonchi, rales  Cardiac:   RRR, Normal S1 and S2. Abdomen:   Soft, non distended, nontender, +BS  Extremities:   Warm, dry. No edema. Left transmetatarsal amputation. Skin:   No rashes, no jaundice  Neuro:  Does not follow commands.  Moves all extremities  Psychiatric:  No anxiety, calm, cooperative    Data Review:  I have reviewed all labs, meds, and studies from the last 24 hours:    Recent Results (from the past 24 hour(s))   GLUCOSE, POC    Collection Time: 08/23/20  4:11 PM   Result Value Ref Range    Glucose (POC) 208 (H) 65 - 100 mg/dL   GLUCOSE, POC    Collection Time: 08/23/20 11:41 PM   Result Value Ref Range    Glucose (POC) 181 (H) 65 - 100 mg/dL   GLUCOSE, POC    Collection Time: 08/24/20  6:12 AM   Result Value Ref Range    Glucose (POC) 141 (H) 65 - 100 mg/dL   GLUCOSE, POC    Collection Time: 08/24/20 11:26 AM   Result Value Ref Range    Glucose (POC) 158 (H) 65 - 100 mg/dL   PHOSPHORUS    Collection Time: 08/24/20 12:21 PM   Result Value Ref Range    Phosphorus 2.9 2.3 - 3.7 MG/DL   MAGNESIUM    Collection Time: 08/24/20 12:21 PM   Result Value Ref Range    Magnesium 2.3 1.8 - 2.4 mg/dL        All Micro Results     None          Current Meds:  Current Facility-Administered Medications Medication Dose Route Frequency    famotidine (PF) (PEPCID) 20 mg in 0.9% sodium chloride 10 mL injection  20 mg IntraVENous Q12H    [Held by provider] insulin lispro (HUMALOG) injection   SubCUTAneous Q6H    lip protectant (BLISTEX) ointment 1 Each  1 Each Topical PRN    NUTRITIONAL SUPPORT ELECTROLYTE PRN ORDERS   Does Not Apply PRN    dextrose 40% (GLUTOSE) oral gel 1 Tube  15 g Oral PRN    glucagon (GLUCAGEN) injection 1 mg  1 mg IntraMUSCular PRN    dextrose (D50W) injection syrg 12.5-25 g  25-50 mL IntraVENous PRN    sodium chloride (NS) flush 5-40 mL  5-40 mL IntraVENous Q8H    sodium chloride (NS) flush 5-40 mL  5-40 mL IntraVENous PRN    acetaminophen (TYLENOL) tablet 650 mg  650 mg Oral Q6H PRN    Or    acetaminophen (TYLENOL) suppository 650 mg  650 mg Rectal Q6H PRN    polyethylene glycol (MIRALAX) packet 17 g  17 g Oral DAILY PRN    promethazine (PHENERGAN) tablet 12.5 mg  12.5 mg Oral Q6H PRN    Or    ondansetron (ZOFRAN) injection 4 mg  4 mg IntraVENous Q6H PRN    enoxaparin (LOVENOX) injection 40 mg  40 mg SubCUTAneous DAILY         Other Studies:  Xr Chest Sngl V    Result Date: 8/4/2020  EXAMINATION: CHEST RADIOGRAPH 8/4/2020 4:53 PM ACCESSION NUMBER: [id number] INDICATION: Possible aspiration. COMPARISON: Radiograph 2 days prior TECHNIQUE: A single view of the chest was obtained. FINDINGS: Support Devices: None Osseous : No suspicious lesion. Cardiomediastinal Silhouette: Prior CABG. Size is unchanged. Lungs: Slight increased opacity in the bilateral bases. Lungs remain underinflated. Pleura: No definite evidence of pleural fluid or pneumothorax. Upper Abdomen: Normal     IMPRESSION: Slight increase in bibasilar opacities which may be atelectasis from underinflation. Pneumonia could have this appearance in the correct clinical context. Xr Abd (kub)    Result Date: 8/13/2020  Exam: Single view abdomen. Indication: NG tube placement. Comparison: None.  FINDINGS: Enteric tube with side-port and tip in the gastric body. Splenic artery calcifications are evident. Sternal wires are partially imaged. IMPRESSION: 1. Enteric tube side-port and tip in the gastric body. Ct Head Wo Cont    Result Date: 8/10/2020  NONCONTRAST HEAD CT CLINICAL HISTORY:  Decreased level of consciousness. TECHNIQUE:  Axial images were obtained with spiral technique. Radiation dose reduction was achieved using one or all of the following techniques: automated exposure control, weight-based dosing, iterative reconstruction. COMPARISON:  August 2, 2020. REPORT:   Standard noncontrast head CT demonstrates no definite intracranial mass effect, hemorrhage, or evidence of acute geographic infarction. Extensive white matter hypodensities are most consistent with small vessel ischemic disease. Prominent calcification in the basal ganglia and dentate nuclei may be physiologic but are again noted to suggest the possibility of hyperparathyroidism/pseudohyperparathyroidism. Mild atrophy is again noted. Orbits  and paranasal sinuses are clear where imaged. Bone windows demonstrate no definite fracture or destruction. IMPRESSION:     ATROPHY WITH EXTENSIVE SMALL VESSEL ISCHEMIC DISEASE BUT NO ACUTE INTRACRANIAL ABNORMALITY IDENTIFIED AT NONCONTRAST CT.     Ct Head Wo Cont    Result Date: 8/2/2020  Exam: CT head without contrast. Indication: Altered mental status. Comparison: None. Multiple axial images obtained through the brain without intravenous contrast. Radiation dose reduction techniques were used for this study: All CT scans performed at this facility use one or all of the following: Automated exposure control, adjustment of the mA and/or kVp according to patient's size, iterative reconstruction. FINDINGS: Prominent chronic mineralization of the dentate nuclei of the cerebellum and of the basal ganglia. Diffuse cerebral atrophy with commensurate ex vacuo dilatation of the ventricles.  Scattered periventricular and centrum semiovale white matter hypointensities most indicative of chronic ischemic white matter change. No evidence of intracranial mass, hemorrhage, or large territorial infarct. The basal cisterns are patent. No extra-axial fluid collection or mass effect. Left lens replacement. The paranasal sinuses are clear. The mastoid air cells and middle ears are clear. No significant osseous or extracranial soft tissue lesions. IMPRESSION: 1. No evidence of acute intracranial abnormality. 2. Prominent chronic mineralization of the basal ganglia and dentate nuclei likely senescent change however recommend correlation for possible hyperparathyroidism or pseudohyperparathyroidism. Xr Chest Port    Result Date: 8/10/2020  History: Hypoxia Exam: portable chest Comparison: 8/4/2020 Findings: There is a mild edema-like pattern to the lungs, similar to the findings seen previously. No change in the appearance of the mediastinal contour or osseous structures. Impressions: Stable portable chest     Xr Chest Port    Result Date: 8/2/2020  EXAM: Single view chest radiograph. INDICATION: Altered mental status. COMPARISON: None. FINDINGS: Hypoventilatory exam with bronchovascular crowding and bibasilar atelectasis. Cardiomegaly evident. Sternal wires appear intact. Mediastinal surgical clips evident. Atherosclerotic aortic arch. Advanced right shoulder joint degenerative changes. IMPRESSION: 1. Hypoventilatory exam with bronchovascular crowding and bibasilar atelectasis. 2. Cardiomegaly status post median sternotomy.    Assessment:    Active Hospital Problems    Diagnosis Date Noted    COVID-19 virus infection 08/10/2020    Acute metabolic encephalopathy 37/62/1258    KAILEY (acute kidney injury) ([facility] [address] 08/02/2020    Alzheimer's dementia with behavioral disturbance ([address] 08/02/2020    DNR (do not resuscitate) 08/02/2020    Benign hypertension     Diabetes mellitus, type II ([address]        Plan:    Acute Metabolic encephalopathy   Alzheimer's dementia with behavioral disturbance  Concerning of worsening dementia and delirium in the setting of recent hospitalization and with COVID infection. Head CT without acute intracranial abnormalities. UA without signs of UTI and CXR without signs of pna. Neurology signed off. GI evaluated the patient and patient is not a candidate for PEG.   - Palliate and Hospice consulted. - Family does not want a PEG but some of the members are having a hard time accepting the fact that she wont have any nutritional intake. Palliative have been consulted to help the family understand the situation and options available to them given her prognosis and current state. Family is exploring and interested in palliative care at the NH.   - NGT was removed on 8/22 as it was clogged. SLP evaluated the patient but she has not been fully cooperative with them during the evals as her mentation/alertness waxes and wanes. SLP recommends PO feeds for pleasure if patient is going to be under palliative care at the [facility] [place] but has high risk for aspiration. Family is aware of the risk for aspiration with PO feeds (and also with PEG). Hypokalemia: resolved  KAILEY:resolved  Hypernatremia: resolved     Recent COVID-19 infection  No respiratory issues. Currently on RA with sats > 96% Completed Decadron during and after prior hospital stay  - Monitor     HTN: Hold home meds since normotensive      DM2 : continue with sliding scale     Diet:  DIET NPO  DIET TUBE FEEDING  DVT PPx: lovenox  Code: DNR  Dispo: Hospice vs Palliative care at [facility] [place]  Estimated Discharge:  Patient is medically stable for discharge. Pending placement as family is planning on palliative service at [facility] [place]. Labs/Imaging Reviewed. Patient is Moderate risk due to current condition and comorbid conditions. Plan discussed with staff, family and are in agreement. Unable to reach [place] over the phone. Left voicemail.     Time Spent: Greater than 45 minutes was spent in reviewing charts, physical exam, discussion with patient, and answered any questions.      Signed By: Dion Andres MD     August 24, 2020

## 2020-08-24 NOTE — PROGRESS NOTES
Problem: Dysphagia (Adult)  Goal: *Speech Goal: (INSERT TEXT)  8/11/2020 1429 by Rayna Macedo, SLP  Note: LTG: Patient will tolerate least restrictive diet without overt signs or symptoms of airway compromise. DISCONTINUED  STG: Patient will tolerate po trials with speech therapy only without overt signs or symptoms of airway compromise. DISCONTINUED      8/11/2020 1429 by Rayna Macedo, SLP  Outcome: Progressing Towards Goal   SPEECH LANGUAGE PATHOLOGY: DYSPHAGIA- Daily Note and Discharge    NAME/AGE/GENDER: Herberth Reyes is a 81 y.o. female  DATE: 8/24/2020  PRIMARY DIAGNOSIS: Acute metabolic encephalopathy [M59.50]  COVID-19 virus infection [U07.1]      ICD-10: Treatment Diagnosis: R13.12 Dysphagia, Oropharyngeal Phase    RECOMMENDATIONS   DIET:    NPO   1-2 teaspoon water per hour for pleasure when alert and upright    MEDICATIONS: Non-oral     ASPIRATION PRECAUTIONS  · Slow rate of intake  · Small bites/sips     COMPENSATORY STRATEGIES/MODIFICATIONS  · None     EDUCATION:  Recommendations discussed with Hospitalist  · Nursing  · Patient  · Family     CONTINUATION OF SKILLED SERVICES/MEDICAL NECESSITY:   Patient is expected to demonstrate progress in  swallow strength, swallow timeliness, swallow function, and swallow safety in order to  improve swallow safety, work toward diet advancement, and decrease aspiration risk.  Patient continues to require skilled intervention due to oral dysphagia. RECOMMENDATIONS for CONTINUED SPEECH THERAPY:   YES: Anticipate need for ongoing speech therapy during this hospitalization. ASSESSMENT   Patient continues with very poor oral acceptance. Max assist for half tsp of puree, but no oral manipulation. She required manual removal of bolus from mouth. Patient has been followed by speech therapy throughout this admission.  Despite increased alertness, she has made no functional improvements towards therapy goals or appropriateness for oral nutrition. Her mentation and acceptance are the biggest hindrance to diet initiation. She remains unable to participate in skilled therapy services and is not demonstrating ability to benefit from dysphagia  treatment. Per prior discussions and chart review, family is considering comfort care vs. Artificial nutrition. Even if diet is order for pleasure, do not anticipate her accepting any po intake based on repeated therapy attempts/trials. Speech therapy to sign off as she is not making functional improvements towards goals or showing any desire for po intake. REHABILITATION POTENTIAL FOR STATED GOALS: Fair    PLAN    FREQUENCY/DURATION: Continue to follow patient 2 times a week for duration of hospital stay to address above goals. - Recommendations for next treatment session: Next treatment will address po trials    SUBJECTIVE   Awake, but not following commands or attempting to communicate. Resistant to po trials- covering mouth with mitts. History of Present Injury/Illness: Ms. Indigo Cannon  has a past medical history of Benign hypertension, Controlled type 2 diabetes mellitus with diabetic autonomic neuropathy, with long-term current use of insulin ([address] Edema, and Hyperlipidemia. . She also  has a past surgical history that includes hx coronary artery bypass graft (2005); hx knee arthroscopy (Bilateral); hx amputation (Left); and hx partial hysterectomy. Problem List:  (Impairments causing functional limitations):  1. Oropharyngeal dysphagia    Orientation:   No verbal response    Pain: Pain Scale 1: Adult Nonverbal Pain Scale  Pain Intensity 1: 0      OBJECTIVE     Swallow treatment:   Multiple po trials presented including thin via tsp, cup, and straw; apple sauce; and pudding. Resistant to trials- covering mouth with mitt and clenching teeth. Only able to accept half tsp of puree with max assistance from clinician. No oral movements or attempts at manipulating bolus.  Anterior holding that ultimately required manual removal by clinician. INTERDISCIPLINARY COLLABORATION: Registered Nurse and Physician  PRECAUTIONS/ALLERGIES: Patient has no known allergies. Tool Used: Dysphagia Outcome and Severity Scale (GERALD)    Score Comments   Normal Diet  [] 7 With no strategies or extra time needed   Functional Swallow  [] 6 May have mild oral or pharyngeal delay   Mild Dysphagia  [] 5 Which may require one diet consistency restricted    Mild-Moderate Dysphagia  [] 4 With 1-2 diet consistencies restricted   Moderate Dysphagia  [] 3 With 2 or more diet consistencies restricted   Moderate-Severe Dysphagia  [] 2 With partial PO strategies (trials with ST only)   Severe Dysphagia  [] 1 With inability to tolerate any PO safely      Score:  Initial: 2 Most Recent: 2(Date 08/24/20 )   Interpretation of Tool: The Dysphagia Outcome and Severity Scale (GERALD) is a simple, easy-to-use, 7-point scale developed to systematically rate the functional severity of dysphagia based on objective assessment and make recommendations for diet level, independence level, and type of nutrition. Current Medications:   No current facility-administered medications on file prior to encounter. Current Outpatient Medications on File Prior to Encounter   Medication Sig Dispense Refill    famotidine (PEPCID) 20 mg tablet Take 1 Tab by mouth two (2) times a day. 14 Tab 0    insulin detemir U-100 (Levemir Flexpen) 100 unit/mL (3 mL) inpn 10 Units by SubCUTAneous route daily. 7 Pen 1    valsartan (DIOVAN) 40 mg tablet Take 1 Tab by mouth daily. 30 Tab 1    diclofenac EC (VOLTAREN) 75 mg EC tablet Take 1 Tab by mouth two (2) times a day. 30 Tab 0    rosuvastatin (CRESTOR) 20 mg tablet TAKE 1 TABLET BY MOUTH EVERY DAY 90 Tab 2    BD INSULIN PEN NEEDLE UF MINI 31 gauge x 3/16\" ndle USE 1 NEEDLE DAILY 30 Pen Needle 12    insulin lispro (HUMALOG) 100 unit/mL injection by SubCUTAneous route.       TRAVATAN Z 0.004 % ophthalmic solution INSTILL 1 DROP IN BOTH EYES NIGHTLY  12    TRUE METRIX AIR GLUCOSE METER monitoring kit USE AS DIRECTED 1 Kit 0    BD SINGLE USE SWABS REGULAR padm       TRUE METRIX GLUCOSE TEST STRIP strip       TRUE METRIX LEVEL 1 soln       TRUEPLUS LANCETS 28 gauge misc          After treatment position/precautions:  · Upright in bed  · RN notified    Total Treatment Duration:   Time In: 1024  Time Out: 400 [facility], [facility] [address] [facility]

## 2020-08-24 NOTE — PROGRESS NOTES
Pt resting in bed comfortably at this time. Unable to assess mental status since patient is non verbal. No distress noted, respirations even and unlabored on room air. Pt denies pain at this time. Pt instructed to call for assistance, will continue to monitor.

## 2020-08-24 NOTE — PROGRESS NOTES
Blood sugar 158. MD Yury Lindsey) made aware of blood sugar and stated to hold insulin for now due to NPO status. Will continue to monitor.

## 2020-08-24 NOTE — PROGRESS NOTES
Problem: Falls - Risk of  Goal: *Absence of Falls  Description: Document Jovan Wynn Fall Risk and appropriate interventions in the flowsheet.   Outcome: Progressing Towards Goal  Note: Fall Risk Interventions:  Mobility Interventions: Communicate number of staff needed for ambulation/transfer    Mentation Interventions: Evaluate medications/consider consulting pharmacy    Medication Interventions: Evaluate medications/consider consulting pharmacy    Elimination Interventions: Patient to call for help with toileting needs

## 2020-08-24 NOTE — PROGRESS NOTES
Pt is sleeping in bed. Respirations present on RA. No signs of distress. No needs expressed. Bed low and locked. Call light within reach.  Report given to [facility] AT [facility]

## 2020-08-24 NOTE — PROGRESS NOTES
Pt resting in bed comfortably at this time, unable to assess mental status. No distress noted, respirations even and unlabored on room air. NS infusing at 50 ml/hr. Visually, the patient is not experiencing pain at this time. Pt instructed to call for assistance if needed, call light in place, will continue to monitor.

## 2020-08-24 NOTE — PROGRESS NOTES
All goals ongoing slow progression 8/24/2020 trial PT   STG:  (1.)Ms. Edwina Polo will move from supine to sit and sit to supine , scoot up and down, and roll side to side with MAXIMAL ASSIST within 4 treatment day(s). (2.)Ms. Edwina Polo will transfer from bed to chair and chair to bed with DEPENDENT using the least restrictive device within 4 treatment day(s). (3.)Ms. Edwina Polo will ambulate with MAXIMAL ASSIST for 3 feet with the least restrictive device within 4 treatment day(s). LTG:  (1.)Ms. Edwina Polo will move from supine to sit and sit to supine , scoot up and down, and roll side to side in bed with MODERATE ASSIST within 7 treatment day(s). (2.)Ms. Edwina Polo will transfer from bed to chair and chair to bed with MODERATE ASSIST using the least restrictive device within 7 treatment day(s). (3.)Ms. Edwina Polo will ambulate with MODERATE ASSIST for 5 feet with the least restrictive device within 7 treatment day(s). ________________________________________________________________________________________________      PHYSICAL THERAPY: Daily Note and PM 8/24/2020  INPATIENT: PT Visit Days : 3  [place] / [place] / Product Type: Medicare /       NAME/AGE/GENDER: Jeana Lemus is a 81 y.o. female   PRIMARY DIAGNOSIS: Acute metabolic encephalopathy [Z46.74]  COVID-19 virus infection [V81.8] Acute metabolic encephalopathy Acute metabolic encephalopathy       ICD-10: Treatment Diagnosis:    · Generalized Muscle Weakness (M62.81)  · Other lack of cordination (R27.8)  · Difficulty in walking, Not elsewhere classified (R26.2)  · Other abnormalities of gait and mobility (R26.89)   Precaution/Allergies:  Patient has no known allergies. ASSESSMENT:     Ms. Edwina Polo   is a disabled female with above diagnosis and flat affect with right sided lean,  who demonstrates with decreased transfers, ambulation and mobility below her prior functional baseline.    All transfers are currently limited at TOTAL ASSISTANCE x 2 bed mobility and seated EOB. OT cotreatment. Turner Osler is positioned EOB dangling with rear trunk support. OT performing ADL's with patient. Trial skilled PT is indicated for this patient's functional mobility deficits. Palliative care. Overall poor to fair progress towards physical therapy goals. Goals listed above are appropriate but slow progression 8/24/2020. PT will continue efforts as patient is still below functional baseline. At this time, patient is appropriate for Co-treatment with occupational therapy when available due to patient's decreased overall endurance/tolerance levels, as well as need for high level skilled assistance to complete functional transfers/mobility and functional tasks. Turner Osler is appropriate for a multidisciplinary co-treatment of PT and OT to address goals of both disciplines. This section established at most recent assessment   PROBLEM LIST (Impairments causing functional limitations):  1. Decreased Strength  2. Decreased ADL/Functional Activities  3. Decreased Transfer Abilities  4. Decreased Ambulation Ability/Technique  5. Decreased Balance  6. Increased Pain  7. Decreased Activity Tolerance  8. Decreased Pacing Skills   INTERVENTIONS PLANNED: (Benefits and precautions of physical therapy have been discussed with the patient.)  1. Balance Exercise  2. Bed Mobility  3. Family Education  4. Therapeutic Activites  5. Therapeutic Exercise/Strengthening     TREATMENT PLAN: Frequency/Duration: 3 for duration of hospital stay  Rehabilitation Potential For Stated Goals: [facility] (at time of discharge pending progress):    Placement: It is my opinion, based on this patient's performance to date, that Ms.  [employer] may benefit from intensive therapy at a [address] [place] [address] after discharge due to the functional deficits listed above that are likely to improve with skilled rehabilitation and concerns that he/she may be unsafe to be unsupervised at home due to debility and decreased mobility. .  Equipment:    None at this time              HISTORY:   History of Present Injury/Illness (Reason for Referral):  PER MD H&P   Klaudia Montiel is a 81 y.o. female with medical history significant diabetes, hypertension, recently discharged from [facility] (8/2-8/7) after being treated for AMS due to UTI. She was tested for COVID during the admission and required 2L O2 NC for acute hypoxic respiratory failure but was weaned down to room air after a few days. SLP evaluated the patient and has approved for pureed diet. Per conversation with the daughter during the admission, patient has been having decreased PO intake and difficulty with eating and as a result, SLP at [facility] has recommended pureed diet as well. She was minimally verbal (although unclear speech), alert and following commands with saturations above 94% on room air on discharge on 8/7 back to [facility]. P    Patient presented today due to altered mental status and decreased appetite. Patient was reportedly hypoxic upon EMS's arrival with saturating of 86% on room air. In the ED, patient is hemodynamically stable, saturating at 95% on room air. Labs are unremarkable and similar to labs on the day of discharge except for increased BUN/Cr of 36/1.37 (from 17/0.79). CXR showed no acute cardiopulmonary disease and appears slightly improved in the bilateral infiltrate. 10 systems reviewed and negative except as noted in HPI. Past Medical History/Comorbidities:   Ms. Morales Current  has a past medical history of Benign hypertension, Controlled type 2 diabetes mellitus with diabetic autonomic neuropathy, with long-term current use of insulin ([address] Edema, and Hyperlipidemia. Ms. Morales Current  has a past surgical history that includes hx coronary artery bypass graft (2005); hx knee arthroscopy (Bilateral); hx amputation (Left); and hx partial hysterectomy.   Social History/Living Environment:   Home Environment: Skilled nursing facility  One/Two Story Residence: Other (Comment)  Living Alone: No  Support Systems: Skilled nursing facility, Child(tori)  Patient Expects to be Discharged to[de-identified] Skilled nursing facility  Current DME Used/Available at Home: Wheelchair  Prior Level of Function/Work/Activity:  Limited and virtually bedbound. Dominant Side:         RIGHT    Personal Factors:          Sex:  female        Age:  81 y.o. Number of Personal Factors/Comorbidities that affect the Plan of Care: 1-2: MODERATE COMPLEXITY   EXAMINATION:   Most Recent Physical Functioning:   Gross Assessment:  AROM: Grossly decreased, non-functional  Strength: Grossly decreased, non-functional  Coordination: Grossly decreased, non-functional  Tone: Normal  Sensation: Impaired               Posture:  Posture (WDL): Exceptions to WDL  Posture Assessment: Cervical, Forward head  Balance:  Sitting: Impaired  Sitting - Static: Poor (constant support)  Sitting - Dynamic: Poor (constant support) Bed Mobility:  Rolling: Total assistance(all x 2 )  Supine to Sit: Total assistance  Sit to Supine: Total assistance  Scooting: Total assistance  Wheelchair Mobility:     Transfers:  Sit to Stand: (unable at this time. )  Gait:            Body Structures Involved:  1. Bones  2. Joints  3. Muscles  4. Ligaments Body Functions Affected:  1. Neuromusculoskeletal  2. Movement Related  3. Skin Related  4. Metobolic/Endocrine Activities and Participation Affected:  1. Communication  2. Mobility  3. Self Care  4. Domestic Life  5.  Community, Social and [place]   Number of elements that affect the Plan of Care: 3: MODERATE COMPLEXITY   CLINICAL PRESENTATION:   Presentation: Evolving clinical presentation with changing clinical characteristics: MODERATE COMPLEXITY   CLINICAL DECISION MAKIN [facility] Short Form  How much difficulty does the patient currently have... Unable A Lot A Little None   1. Turning over in bed (including adjusting bedclothes, sheets and blankets)? [x] 1   [] 2   [] 3   [] 4   2. Sitting down on and standing up from a chair with arms ( e.g., wheelchair, bedside commode, etc.)   [x] 1   [] 2   [] 3   [] 4   3. Moving from lying on back to sitting on the side of the bed? [x] 1   [] 2   [] 3   [] 4   How much help from another person does the patient currently need. .. Total A Lot A Little None   4. Moving to and from a bed to a chair (including a wheelchair)? [x] 1   [] 2   [] 3   [] 4   5. Need to walk in hospital room? [x] 1   [] 2   [] 3   [] 4   6. Climbing 3-5 steps with a railing? [x] 1   [] 2   [] 3   [] 4   © 2007, Trustees of [address] [place] [address] [address] [id number], under license to [employer]. All rights reserved      Score:  Initial: 6 Most Recent: X (Date: -- )    Interpretation of Tool:  Represents activities that are increasingly more difficult (i.e. Bed mobility, Transfers, Gait). Medical Necessity:     · Patient demonstrates   · poor  ·  rehab potential due to higher previous functional level. Reason for Services/Other Comments:  · Patient continues to require skilled intervention due to   · medical complications, patient unable to attend/participate in therapy as expected, and flat affect and debility with decreased mobility. · .   Use of outcome tool(s) and clinical judgement create a POC that gives a: Questionable prediction of patient's progress: MODERATE COMPLEXITY            TREATMENT:   (In addition to Assessment/Re-Assessment sessions the following treatments were rendered)   Pre-treatment Symptoms/Complaints:  0/10 no pain reported. Pain: Initial:   Pain Intensity 1: 0  Post Session:  0/10    Today's treatment session addressed Decreased Strength, Decreased ADL/Functional Activities, Decreased Transfer Abilities, Decreased Ambulation Ability/Technique and Decreased Balance to progress towards achieving goal(s) 3. During this session, Occupational Therapy addressed ADLs to progress towards their discipline specific goal(s). Co-treatment was necessary to improve patient's cognitive participation, ability to follow higher level commands, ability to increase activity demands and ability to return to normal functional activity. Therapeutic Activity: (    15 mins): Therapeutic activities including Bed transfers and bed mobility, dangling EOB and trunk mobility balance to improve mobility, strength, balance, and coordination. Required moderate   to promote coordination of bilateral, lower extremity(s) and promote motor control of bilateral, lower extremity(s). Therapeutic Exercise: ( 0 mins):  Exercises per grid below to improve mobility, strength, balance, and coordination. Required minimal visual, verbal, manual, and tactile cues to promote proper body alignment, promote proper body posture, and promote proper body mechanics. Progressed repetitions as indicated. Bed to Chair seated mobility. Date:  8/12  Date:   Date:     Activity/Exercise Parameters Parameters Parameters   AP's  10 x's      HIP ABD  10 x's      Marches  10 x's     LAQ's 10 x's                         Braces/Orthotics/Lines/Etc:   · O2 Device: Room air and PURE WICK   Treatment/Session Assessment:    · Response to Treatment: limited mobility and flat affect with rightward trunk lean. · Interdisciplinary Collaboration:   o Physical Therapist  o Occupational Therapist  · After treatment position/precautions:   o Supine in bed  o Bed alarm/tab alert on  o Bed/Chair-wheels locked  o Bed in low position  o Call light within reach  o RN notified  o Side rails x 3   · Compliance with Program/Exercises: Noncompliant much of the time  · Recommendations/Intent for next treatment session: \"Next visit will focus on advancements to more challenging activities, reduction in assistance provided, and transfers, and mobility for bilateral LE's. \".   Total Treatment Duration:  PT Patient Time In/Time Out  Time In: 6925  Time Out: [facility] [place]

## 2020-08-24 NOTE — PROGRESS NOTES
1.Chart reviewed by case management for discharge planning. CM spoke with liaison James Castro from [facility] palliative and she will reach out  To daughter Jazmin Atkins about decisions to return to facility with Palliative care services. 2. James Castro returned message that daughter was to discuss with patients facility about  Returning with Palliative Care services.      Please consult  if any new issues arise

## 2020-08-24 NOTE — PROGRESS NOTES
Problem: Patient Education: Go to Patient Education Activity  Goal: Patient/Family Education  Description:   1. Patient will follow 100% of one step verbal or visual commands to increase participation during ADL performance. 2. Patient will complete self-feeding with min A and adaptive equipment as needed. 3. Patient will tolerate 25  minutes of OT treatment with 2-3 rest breaks to increase activity tolerance for ADLs. 4. Patient will complete functional transfers with mod A and adaptive equipment as needed. 5. Patient will complete upper body bathing and dressing with min A and adaptive equipment as needed. 6. Patient will complete self-grooming with min A and adaptive equipment as needed. Timeframe: 7 visits     Outcome: Progressing Towards Goal    OCCUPATIONAL THERAPY: Daily Note and AM 8/24/2020  INPATIENT: OT Visit Days: 2  [place] / [place] / Product Type: Medicare /      NAME/AGE/GENDER: Yury Ospina is a 81 y.o. female   PRIMARY DIAGNOSIS:  Acute metabolic encephalopathy [A28.34]  COVID-19 virus infection [B80.9] Acute metabolic encephalopathy Acute metabolic encephalopathy     ICD-10: Treatment Diagnosis:    · Generalized Muscle Weakness (M62.81)  · Other lack of cordination (R27.8)  · Difficulty in walking, Not elsewhere classified (R26.2)   Precautions/Allergies:     Patient has no known allergies. ASSESSMENT:     Ms. Indigo Cannon presents for the above diagnoses. Upon arrival, pt supine in bed. Pt is alert, however presents with increased confusion, decreased command following, and decreased awareness of environment this session. Per chart, pt was a long-term resident at [facility] where she was requiring assistance with ADLs and ambulation. 8/24/2020: Pt found sleeping supine in bed. Pt drowsy but appears agreeable to OT/PT co-treatment to increase activity tolerance and likelihood of return to baseline.  OT worked on self care while PT worked on functional transfers/balance. Pt does not present with any pain at rest. Pt with no verbalizations and no command following observed throughout session. Pt requires Total A x 2 to complete supine to sit. Seated edge of bed, pt with poor static and poor dynamic balance with significant posterior lean. Pt is provided with warm wash cloth and maximal verbal, visual, and tactile cueing to wash face. Pt does not respond to commands provided and requires Total A to wash face with Total A from therapy for balance to maintain upright position. Pt requires Total A x 2 for sit to supine and Total A x 2 to roll to left side of bed to reposition pt for increased comfort and reduce pressure to buttocks. Pt left sidelying in bed with all needs met and within reach. RN notified. At this time, patient is appropriate for Co-treatment with physical therapy due to patient's clinical complexity, decreased overall endurance/tolerance levels, as well as need for high level assistance and cues and intervention to complete functional transfers/mobility and functional tasks in safe manner. Gael Monday is appropriate for a multidisciplinary co-treatment of PT and OT to address goals of both disciplines. This section established at most recent assessment   PROBLEM LIST (Impairments causing functional limitations):  1. Decreased Strength  2. Decreased ADL/Functional Activities  3. Decreased Transfer Abilities  4. Decreased Ambulation Ability/Technique  5. Decreased Balance  6. Decreased Activity Tolerance  7. Decreased Cognition   INTERVENTIONS PLANNED: (Benefits and precautions of occupational therapy have been discussed with the patient.)  1. Activities of daily living training  2. Adaptive equipment training  3. Balance training  4. Clothing management  5. Cognitive training  6. Community reintergration  7. Donning&doffing training  8. Neuromuscular re-eduation  9. Re-evaluation  10. Therapeutic activity  11.  Therapeutic exercise     TREATMENT PLAN: Frequency/Duration: Follow patient 2x/week trial basis to address above goals. Rehabilitation Potential For Stated Goals: Good     REHAB RECOMMENDATIONS (at time of discharge pending progress):    Placement: It is my opinion, based on this patient's performance to date, that Ms. Andrey Charlton may benefit from intensive therapy at a [address] [place] [address] after discharge due to the functional deficits listed above that are likely to improve with skilled rehabilitation and concerns that he/she may be unsafe to be unsupervised at home due to decline in ability to safely perform ADLs and functional mobility. .  Equipment:    TBD              OCCUPATIONAL PROFILE AND HISTORY:   History of Present Injury/Illness (Reason for Referral):  See H&P  Past Medical History/Comorbidities:   Ms. Andrey Charlton  has a past medical history of Benign hypertension, Controlled type 2 diabetes mellitus with diabetic autonomic neuropathy, with long-term current use of insulin ([address] Edema, and Hyperlipidemia. Ms. Andrey Charlton  has a past surgical history that includes hx coronary artery bypass graft (2005); hx knee arthroscopy (Bilateral); hx amputation (Left); and hx partial hysterectomy. Social History/Living Environment:   Home Environment: Skilled nursing facility  One/Two Story Residence: Other (Comment)  Living Alone: No  Support Systems: Marcelino Brantley, Child(tori)  Patient Expects to be Discharged to[de-identified] Skilled nursing facility  Current DME Used/Available at Home: Wheelchair  Prior Level of Function/Work/Activity:  Assist with ADLs and functional mobility; resident at [facility]. Personal Factors:          Sex:  female        Age:  81 y.o.         Other factors that influence how disability is experienced by the patient:  multiple co-morbidities    Number of Personal Factors/Comorbidities that affect the Plan of Care: Brief history (0):  LOW COMPLEXITY   ASSESSMENT OF OCCUPATIONAL PERFORMANCE[de-identified]   Activities of Daily Living:   Basic ADLs (From Assessment) Complex ADLs (From Assessment)   Feeding: Total assistance  Oral Facial Hygiene/Grooming: Total assistance  Bathing: Total assistance  Upper Body Dressing: Total assistance  Lower Body Dressing: Total assistance  Toileting: Total assistance Instrumental ADL  Meal Preparation: Total assistance  Homemaking: Total assistance   Grooming/Bathing/Dressing Activities of Daily Living   Grooming  Grooming Assistance: Total assistance(dependent)  Position Performed: Seated edge of bed  Washing Face: Total assistance (dependent)                         Bed/Mat Mobility  Rolling: Total assistance(all x 2 )  Supine to Sit: Total assistance  Sit to Supine: Total assistance  Sit to Stand: (unable at this time. )  Scooting: Total assistance     Most Recent Physical Functioning:   Gross Assessment:                  Posture:  Posture (WDL): Exceptions to WDL  Posture Assessment: Cervical, Forward head  Balance:  Sitting: Impaired  Sitting - Static: Poor (constant support)  Sitting - Dynamic: Poor (constant support)  Standing: (Did not attempt) Bed Mobility:  Rolling: Total assistance(all x 2 )  Supine to Sit: Total assistance  Sit to Supine: Total assistance  Scooting: Total assistance  Wheelchair Mobility:     Transfers:  Sit to Stand: (unable at this time. )            Patient Vitals for the past 6 hrs:   BP SpO2 Pulse   08/24/20 1134 157/77 91 % 72       Mental Status  Neurologic State: Confused  Orientation Level: Unable to verbalize  Cognition: Unable to assess (comment)  Perception: Appears intact  Perseveration: No perseveration noted  Safety/Judgement: Decreased awareness of environment, Decreased awareness of need for assistance, Decreased awareness of need for safety, Decreased insight into deficits                          Physical Skills Involved:  1. Balance  2. Strength  3. Activity Tolerance  4.  Gross Motor Control Cognitive Skills Affected (resulting in the inability to perform in a timely and safe manner):  1. Perception  2. Executive Function  3. Immediate Memory  4. Short Term Recall  5. Long Term Memory  6. Sustained Attention  7. Divided Attention  8. Comprehension  9. Expression Psychosocial Skills Affected:  1. Habits/Routines  2. Environmental Adaptation   Number of elements that affect the Plan of Care: 5+:  HIGH COMPLEXITY   CLINICAL DECISION MAKIN[address] [place] [address] AM-PAC 6 Clicks   Daily Activity Inpatient Short Form  How much help from another person does the patient currently need. .. Total A Lot A Little None   1. Putting on and taking off regular lower body clothing? [x] 1   [] 2   [] 3   [] 4   2. Bathing (including washing, rinsing, drying)? [x] 1   [] 2   [] 3   [] 4   3. Toileting, which includes using toilet, bedpan or urinal?   [x] 1   [] 2   [] 3   [] 4   4. Putting on and taking off regular upper body clothing? [x] 1   [] 2   [] 3   [] 4   5. Taking care of personal grooming such as brushing teeth? [x] 1   [] 2   [] 3   [] 4   6. Eating meals? [x] 1   [] 2   [] 3   [] 4   © , Trustees of [address] [place] [address], under license to [employer]. All rights reserved      Score:  Initial: 6 Most Recent: X (Date: -- )    Interpretation of Tool:  Represents activities that are increasingly more difficult (i.e. Bed mobility, Transfers, Gait). Medical Necessity:     · Patient demonstrates   · good  ·  rehab potential due to higher previous functional level. Reason for Services/Other Comments:  · Patient continues to require skilled intervention due to   · medical complications and patient unable to attend/participate in therapy as expected  · .    Use of outcome tool(s) and clinical judgement create a POC that gives a: LOW COMPLEXITY         TREATMENT:   (In addition to Assessment/Re-Assessment sessions the following treatments were rendered)     Pre-treatment Symptoms/Complaints: Pt presents with no pain at rest, during, or following functional mobility. Pain: Initial:   Pain Intensity 1: 0 /10 Post Session: Unchanged     Today's treatment session addressed Decreased ADL/Functional Activities, Decreased Activity Tolerance, Increased Fatigue and Decreased Cognition to progress towards achieving goal(s) listed above. During this session, Physical Therapy addressed  Functional Transfers and Balance to progress towards their discipline specific goal(s). Co-treatment was necessary to improve patient's cognitive participation, ability to follow higher level commands, ability to increase activity demands and ability to return to normal functional activity. Self Care: (15 minutes): Procedure(s) utilized to improve and/or restore self-care/home management as related to grooming. Required maximal to total visual, verbal, manual and tactile cueing to facilitate activities of daily living skills. Pt is provided with warm wash cloth and maximal verbal, visual, and tactile cueing to wash face. Pt does not respond to commands provided and requires Total A to wash face with Total A from therapy for balance to maintain upright position. Braces/Orthotics/Lines/Etc:   · Pure Wick Catheter  · O2 Device: Room air  Treatment/Session Assessment:    · Response to Treatment:  Pt with no verbalizations and no command following observed this session. · Interdisciplinary Collaboration:   o Physical Therapist  o Occupational Therapist  o Registered Nurse  · After treatment position/precautions:   o Supine in bed  o Bed/Chair-wheels locked  o Bed in low position  o Call light within reach  o RN notified  o Head of bed elevated for increased safety   · Compliance with Program/Exercises: Will assess as treatment progresses. · Recommendations/Intent for next treatment session: \"Next visit will focus on advancements to more challenging activities and reduction in assistance provided\".   Total Treatment Duration:  OT Patient Time In/Time Out  Time In: 1145  Time Out: 4927 Rachele Ryan, OTR/L

## 2020-08-24 NOTE — PROGRESS NOTES
Nutrition Note    8-22: NGT cloggged and removed. Fmaily decided not to pursure PEG  8-24: Remains NPO per SLP eval.  RD signing off. TF order cancelled and routine Phos and Mg labs discontinued.     Electronically signed by Aleyda Saavedra RD on 8/24/2020 at 1:27 PM    Contact: [phone]

## 2020-08-24 NOTE — PROGRESS NOTES
Pt is sleeping in bed. Respirations present on RA. No signs of distress. No needs expressed. Bed low and locked. Call light within reach.  Report received from Moi Fragoso

## 2020-08-25 LAB
GLUCOSE BLD STRIP.AUTO-MCNC: 138 MG/DL (ref 65–100)
GLUCOSE BLD STRIP.AUTO-MCNC: 157 MG/DL (ref 65–100)
GLUCOSE BLD STRIP.AUTO-MCNC: 159 MG/DL (ref 65–100)
GLUCOSE BLD STRIP.AUTO-MCNC: 162 MG/DL (ref 65–100)
GLUCOSE BLD STRIP.AUTO-MCNC: 183 MG/DL (ref 65–100)

## 2020-08-25 PROCEDURE — 74011250636 HC RX REV CODE- 250/636: Performed by: INTERNAL MEDICINE

## 2020-08-25 PROCEDURE — 99232 SBSQ HOSP IP/OBS MODERATE 35: CPT | Performed by: INTERNAL MEDICINE

## 2020-08-25 PROCEDURE — 82962 GLUCOSE BLOOD TEST: CPT

## 2020-08-25 PROCEDURE — 74011000250 HC RX REV CODE- 250: Performed by: INTERNAL MEDICINE

## 2020-08-25 PROCEDURE — 65270000029 HC RM PRIVATE

## 2020-08-25 RX ADMIN — Medication 10 ML: at 22:00

## 2020-08-25 RX ADMIN — FAMOTIDINE 20 MG: 10 INJECTION INTRAVENOUS at 08:30

## 2020-08-25 RX ADMIN — Medication 10 ML: at 05:02

## 2020-08-25 RX ADMIN — FAMOTIDINE 20 MG: 10 INJECTION INTRAVENOUS at 21:51

## 2020-08-25 RX ADMIN — Medication 10 ML: at 14:29

## 2020-08-25 RX ADMIN — SODIUM CHLORIDE 50 ML/HR: 900 INJECTION, SOLUTION INTRAVENOUS at 08:32

## 2020-08-25 RX ADMIN — ENOXAPARIN SODIUM 40 MG: 40 INJECTION SUBCUTANEOUS at 08:30

## 2020-08-25 NOTE — PROGRESS NOTES
Palliative Care Progress Note    Patient: Tammie Trinidad MRN: [id number]  SSN: [id number]    YOB: 1935  Age: 81 y.o. Sex: female       Assessment/Plan:     Chief Complaint/Interval History: pt minimally responsive. Family has decided against peg    Principal Diagnosis:    · Dysphagia  R13.10    Additional Diagnoses:   · Altered Mental Status R41.82   · Debility  · Encounter for palliative care    Palliative Performance Scale (PPS)   10%    Medical Decision Making:   Reviewed and summarized labs and imaging. Discussed with CM. Plan is for SNF with [facility]. No current hospice diagnosis however will likely develop as condition declines. Can transition to hospice as outpatient. Will sign off but remain available if needed. Will discuss findings with members of the interdisciplinary team.      More than 50% of this 25 minute visit was spent counseling and coordination of care as outlined above. .    Subjective:     Review of Systems unable to obtain due to mentation. Objective:     Visit Vitals  /73 (BP 1 Location: Left arm, BP Patient Position: At rest)   Pulse 79   Temp 97.8 °F (36.6 °C)   Resp 20   Ht 5' 5\" (1.651 m)   Wt 202 lb 6.1 oz (91.8 kg)   SpO2 95%   BMI 33.68 kg/m²       Physical Exam:    General:  No acute distress. frail   Eyes:  Conjunctivae/corneas clear    Nose: Nares normal. Septum midline. Neck: Supple, symmetrical, trachea midline, no JVD   Lungs:   Clear to auscultation bilaterally, unlabored   Heart:  Regular rate and rhythm, no murmur    Abdomen:   Soft, non-tender, non-distended   Extremities: Normal, atraumatic, no cyanosis or edema   Skin: Skin color, texture, turgor normal. No rash or lesions.    Neurologic: Not following commands   Psych: lethargic     Signed By: Ghazal Whitlock MD     August 25, 2020

## 2020-08-25 NOTE — PROGRESS NOTES
Pt in bed. Respirations present on RA. No signs of distress. Bed low and locked. Call light within reach.  Report given to [facility], [facility]

## 2020-08-25 NOTE — PROGRESS NOTES
PT lying in bed resting. Breathing room air. Bed in locked and low position. Bed alarm activated. Side rails X3. Droplet plus precautions maintained. Preparing to give report to oncoming nurse.

## 2020-08-25 NOTE — PROGRESS NOTES
Progress Note    Patient: Farzad Yung MRN: [id number]  SSN: [id number]    YOB: 1935  Age: 81 y.o. Sex: female      Admit Date: 8/10/2020    LOS: 15 days     Subjective:   F/U altered mental status, dementia    \"81 y.o. female with medical history significant for DM, HTN who was recently hospitalized from 8/2-7 for UTI and metabolic encephalopathy. Tested for COVID due to placement and was positive. Weaned from 2L to RA. DC to [facility] on 8/7. Was reportedly tolerating diet and following commands. Brought back on 8/10 with encephalopathy, O2 sats with EMS 86% (but no hypoxia since admission). Remained non-verbal, no respiratory issues.  Neurology feel encephalopathy due to persistent delirium. Started on TFs via NGT on 8/13. Extensive discussions have been had with the family by Hospitalist team regarding poor prognosis given her mental status with underlying dementia. Family has decided to not forward with PEG placement but having a hard time accepting the idea of patient being without any form of nutrition. Palliative care has been brought in to help with decision making for hospice, comfort care, palliative measures. Family is leaning toward palliative care service at [facility] and PO diet for pleasure with knowing the risks of aspirations. \"     Cannot obtain ROS from patient due to severe dementia.    Current Facility-Administered Medications   Medication Dose Route Frequency    famotidine (PF) (PEPCID) 20 mg in 0.9% sodium chloride 10 mL injection  20 mg IntraVENous Q12H    [Held by provider] insulin lispro (HUMALOG) injection   SubCUTAneous Q6H    lip protectant (BLISTEX) ointment 1 Each  1 Each Topical PRN    NUTRITIONAL SUPPORT ELECTROLYTE PRN ORDERS   Does Not Apply PRN    dextrose 40% (GLUTOSE) oral gel 1 Tube  15 g Oral PRN    glucagon (GLUCAGEN) injection 1 mg  1 mg IntraMUSCular PRN    dextrose (D50W) injection syrg 12.5-25 g  25-50 mL IntraVENous PRN    sodium chloride (NS) flush 5-40 mL  5-40 mL IntraVENous Q8H    sodium chloride (NS) flush 5-40 mL  5-40 mL IntraVENous PRN    acetaminophen (TYLENOL) tablet 650 mg  650 mg Oral Q6H PRN    Or    acetaminophen (TYLENOL) suppository 650 mg  650 mg Rectal Q6H PRN    polyethylene glycol (MIRALAX) packet 17 g  17 g Oral DAILY PRN    promethazine (PHENERGAN) tablet 12.5 mg  12.5 mg Oral Q6H PRN    Or    ondansetron (ZOFRAN) injection 4 mg  4 mg IntraVENous Q6H PRN    enoxaparin (LOVENOX) injection 40 mg  40 mg SubCUTAneous DAILY       Objective:     Vitals:    08/24/20 2350 08/25/20 0429 08/25/20 0742 08/25/20 1236   BP: 163/82 149/56 172/73 174/75   Pulse: 83 78 79 96   Resp: 18 18 20 20   Temp: 99.2 °F (37.3 °C) 98.3 °F (36.8 °C) 97.8 °F (36.6 °C) 97.7 °F (36.5 °C)   SpO2: 95% 94% 95% 96%   Weight:       Height:             Intake and Output:  Current Shift: No intake/output data recorded. Last three shifts: 08/23 1901 - 08/25 0700  In: 275.9 [I.V.:275.9]  Out: -     Physical Exam:   General:  Alert, no acute distress. Not answering questions. Staring. Eyes:  Conjunctivae/corneas clear. Ears:  Normal TMs and external ear canals both ears. Nose: Nares normal. Septum midline. Mouth/Throat: Slight dry oral mucosa. Neck:  no JVD. Back:   deferred. Lungs:   Clear to auscultation bilaterally. Heart:  Blowing systolic murmur heard best at left 2nd intercostal space   Abdomen:   Soft, non-tender. Bowel sounds normal.    Extremities: Missing digits to left foot    Pulses: 2+ and symmetric all extremities.    Skin: Skin color, texture, turgor normal. No rashes or lesions   Lymph nodes: Cervical, supraclavicular, and axillary nodes normal.   Neurologic: demented       Lab/Data Review:    Recent Results (from the past 24 hour(s))   GLUCOSE, POC    Collection Time: 08/24/20  4:07 PM   Result Value Ref Range    Glucose (POC) 173 (H) 65 - 100 mg/dL   GLUCOSE, POC    Collection Time: 08/25/20 12:01 AM   Result Value Ref Range Glucose (POC) 162 (H) 65 - 100 mg/dL   GLUCOSE, POC    Collection Time: 08/25/20  5:39 AM   Result Value Ref Range    Glucose (POC) 138 (H) 65 - 100 mg/dL   GLUCOSE, POC    Collection Time: 08/25/20 11:49 AM   Result Value Ref Range    Glucose (POC) 159 (H) 65 - 100 mg/dL       Assessment/ Plan:     Principal Problem:    Acute metabolic encephalopathy (6/9/2983)    Active Problems:    Diabetes mellitus, type II (HCC) ()      Benign hypertension ()      KAILEY (acute kidney injury) ([facility] (8/2/2020)      Alzheimer's dementia with behavioral disturbance ([facility] (8/2/2020)      DNR (do not resuscitate) (8/2/2020)      COVID-19 virus infection (7/28/2986)    Metabolic encephalopathy - Likely from worsening dementia. Unable to take PO well. Family is okay with pleasure feeding. Family is aware of increased risk of aspiration with pleasure feeding. They do not want feeding tube. Hypokalemia - Resolved. KAILEY- Resolved. Hypernatremia - Resolved. HTN- Not on BP medications at this time. DM type II - Held SS since not eating. Recent COVID infection - Supportive care. Currently on RA. Spoken to the daughter who states they would like pleasure feeding. Willing to speak with hospice about what they offer and also willing for possible hospice house if she meets criteria. Daughter understands likelihood of quick transition to hospice even if they discharged her mother with palliative     Like dc back to facility tomorrow with either palliative care or hospice.      DVT prophylaxis - Lovenox   Signed By: Dionicio Springer DO     August 25, 2020

## 2020-08-25 NOTE — PROGRESS NOTES
SBAR received from Merlin Duncan RN. Patient stable, lying in bed resting, in no apparent distress. Breathing RA. Call light within reach. Bed alarm activated. Bed in locked and low position. Droplet plus precautions maintained.

## 2020-08-26 LAB
GLUCOSE BLD STRIP.AUTO-MCNC: 142 MG/DL (ref 65–100)
GLUCOSE BLD STRIP.AUTO-MCNC: 187 MG/DL (ref 65–100)
GLUCOSE BLD STRIP.AUTO-MCNC: 202 MG/DL (ref 65–100)

## 2020-08-26 PROCEDURE — 97530 THERAPEUTIC ACTIVITIES: CPT

## 2020-08-26 PROCEDURE — 77030038269 HC DRN EXT URIN PURWCK BARD -A

## 2020-08-26 PROCEDURE — 74011000250 HC RX REV CODE- 250: Performed by: INTERNAL MEDICINE

## 2020-08-26 PROCEDURE — 97110 THERAPEUTIC EXERCISES: CPT

## 2020-08-26 PROCEDURE — 74011250636 HC RX REV CODE- 250/636: Performed by: INTERNAL MEDICINE

## 2020-08-26 PROCEDURE — 82962 GLUCOSE BLOOD TEST: CPT

## 2020-08-26 PROCEDURE — 65270000029 HC RM PRIVATE

## 2020-08-26 RX ADMIN — FAMOTIDINE 20 MG: 10 INJECTION INTRAVENOUS at 21:40

## 2020-08-26 RX ADMIN — FAMOTIDINE 20 MG: 10 INJECTION INTRAVENOUS at 08:12

## 2020-08-26 RX ADMIN — Medication 10 ML: at 21:40

## 2020-08-26 RX ADMIN — Medication 10 ML: at 14:00

## 2020-08-26 RX ADMIN — ENOXAPARIN SODIUM 40 MG: 40 INJECTION SUBCUTANEOUS at 08:12

## 2020-08-26 RX ADMIN — Medication 10 ML: at 05:06

## 2020-08-26 NOTE — PROGRESS NOTES
Patient lying in bed resting quietly. Respirations even and unlabored on room air. Bed in low and locked position, call light within place.

## 2020-08-26 NOTE — PROGRESS NOTES
Patient resting quietly in bed watching TV. Respirations even and unlabored on room air. No distress noted at this time. Report given to night shift to assume care of patient.

## 2020-08-26 NOTE — PROGRESS NOTES
Cm spoke with [facility] after IDR with MD. MD states HCPOA daughter would like to initiate with Hospice in the faciliity that patient is a resident at. [facility]. [address] [facility] family request for patient to return with hospice. 1215 requested on the status to [facility] for transfer. [facility] asked when pt would be ready, I said today  46 messaged Catherine for any updates. 46 MD notified that patient would not be able to transfer today  1520 message from [facility] we need a LOC on patient  1630 LOC faxed to to [facility]  Discharge plan is for pt to return to [facility] under [address]. Pending TC approval  IRIS will continue to follow

## 2020-08-26 NOTE — PROGRESS NOTES
Pt is resting in bed at this time. Pt opens eyes to stimulus and responds with garbled words and head nods. Pt is on RA. Pt has no s/sx of acute distress at this time. Pt has safety measures in place. Pt call light is within reach. Will continue to monitor.

## 2020-08-26 NOTE — PROGRESS NOTES
Progress Note    Patient: Dea Selby MRN: [id number]  SSN: [id number]    YOB: 1935  Age: 81 y.o. Sex: female      Admit Date: 8/10/2020    LOS: 16 days     Subjective:   F/U altered mental status, dementia    \"81 y.o. female with medical history significant for DM, HTN who was recently hospitalized from 8/2-7 for UTI and metabolic encephalopathy. Tested for COVID due to placement and was positive. Weaned from 2L to RA. DC to [facility] on 8/7. Was reportedly tolerating diet and following commands. Brought back on 8/10 with encephalopathy, O2 sats with EMS 86% (but no hypoxia since admission). Remained non-verbal, no respiratory issues.  Neurology feel encephalopathy due to persistent delirium. Started on TFs via NGT on 8/13. Extensive discussions have been had with the family by Hospitalist team regarding poor prognosis given her mental status with underlying dementia. Family has decided to not forward with PEG placement but having a hard time accepting the idea of patient being without any form of nutrition. Palliative care has been brought in to help with decision making for hospice, comfort care, palliative measures. \"     Cannot obtain ROS from patient due to severe dementia. Ate some of her meals with 1:1 assistance.  Choking and slow to swallow   Current Facility-Administered Medications   Medication Dose Route Frequency    famotidine (PF) (PEPCID) 20 mg in 0.9% sodium chloride 10 mL injection  20 mg IntraVENous Q12H    [Held by provider] insulin lispro (HUMALOG) injection   SubCUTAneous Q6H    lip protectant (BLISTEX) ointment 1 Each  1 Each Topical PRN    NUTRITIONAL SUPPORT ELECTROLYTE PRN ORDERS   Does Not Apply PRN    dextrose 40% (GLUTOSE) oral gel 1 Tube  15 g Oral PRN    glucagon (GLUCAGEN) injection 1 mg  1 mg IntraMUSCular PRN    dextrose (D50W) injection syrg 12.5-25 g  25-50 mL IntraVENous PRN    sodium chloride (NS) flush 5-40 mL  5-40 mL IntraVENous Q8H    sodium chloride (NS) flush 5-40 mL  5-40 mL IntraVENous PRN    acetaminophen (TYLENOL) tablet 650 mg  650 mg Oral Q6H PRN    Or    acetaminophen (TYLENOL) suppository 650 mg  650 mg Rectal Q6H PRN    polyethylene glycol (MIRALAX) packet 17 g  17 g Oral DAILY PRN    promethazine (PHENERGAN) tablet 12.5 mg  12.5 mg Oral Q6H PRN    Or    ondansetron (ZOFRAN) injection 4 mg  4 mg IntraVENous Q6H PRN    enoxaparin (LOVENOX) injection 40 mg  40 mg SubCUTAneous DAILY       Objective:     Vitals:    08/25/20 2340 08/26/20 0320 08/26/20 0754 08/26/20 1151   BP: 150/78 129/63 110/58 148/72   Pulse: 84 73 68 65   Resp: 18 18 18 20   Temp: 97.9 °F (36.6 °C) 98.1 °F (36.7 °C) 97.8 °F (36.6 °C) 98.4 °F (36.9 °C)   SpO2: 93% 94% 100% 98%   Weight:       Height:             Intake and Output:  Current Shift: 08/26 0701 - 08/26 1900  In: 100 [P.O.:100]  Out: -   Last three shifts: 08/24 1901 - 08/26 0700  In: 455.9 [P.O.:180; I.V.:275.9]  Out: -     Physical Exam:   General:  Alert, no acute distress. Will answer a few questions    Eyes:  Conjunctivae/corneas clear. Ears:  Normal TMs and external ear canals both ears. Nose: Nares normal. Septum midline. Mouth/Throat: Slight dry oral mucosa. Neck:  no JVD. Back:   deferred. Lungs:   Clear to auscultation bilaterally. Heart:  RRR   Abdomen:   Soft, non-tender. Bowel sounds normal.    Extremities: Missing digits to left foot    Pulses: 2+ and symmetric all extremities.    Skin: Skin color, texture, turgor normal. No rashes or lesions   Lymph nodes: Cervical, supraclavicular, and axillary nodes normal.   Neurologic: demented       Lab/Data Review:    Recent Results (from the past 24 hour(s))   GLUCOSE, POC    Collection Time: 08/25/20  4:31 PM   Result Value Ref Range    Glucose (POC) 157 (H) 65 - 100 mg/dL   GLUCOSE, POC    Collection Time: 08/25/20 11:48 PM   Result Value Ref Range    Glucose (POC) 183 (H) 65 - 100 mg/dL   GLUCOSE, POC    Collection Time: 08/26/20 5:56 AM   Result Value Ref Range    Glucose (POC) 142 (H) 65 - 100 mg/dL   GLUCOSE, POC    Collection Time: 08/26/20 11:38 AM   Result Value Ref Range    Glucose (POC) 202 (H) 65 - 100 mg/dL       Assessment/ Plan:     Principal Problem:    Acute metabolic encephalopathy (4/6/0085)    Active Problems:    Diabetes mellitus, type II (HCC) ()      Benign hypertension ()      KIALEY (acute kidney injury) ([facility] [address] (8/2/2020)      Alzheimer's dementia with behavioral disturbance ([facility] [address] (8/2/2020)      DNR (do not resuscitate) (8/2/2020)      COVID-19 virus infection (8/58/5386)    Metabolic encephalopathy - Likely from worsening dementia. Unable to take PO well. Family is okay with pleasure feeding. Family is aware of increased risk of aspiration with pleasure feeding. They do not want feeding tube. Hypokalemia - Resolved. KAILEY- Resolved. Hypernatremia - Resolved. HTN- Not on BP medications at this time. DM type II - Held SS since not eating well. Recent COVID infection - Supportive care. Currently on RA. Spoken to the daughter. She is planning on hospice tomorrow but wants to update family before we change orders. DC back to [facility] with hospice tomorrow.      DVT prophylaxis - Lovenox   Signed By: Clifton Nichols DO     August 26, 2020

## 2020-08-26 NOTE — PROGRESS NOTES
All goals ongoing slow progression 8/26/2020 trial PT   STG:  (1.)Ms. [employer] will move from supine to sit and sit to supine , scoot up and down, and roll side to side with MAXIMAL ASSIST within 4 treatment day(s). (2.)Ms. [employer] will transfer from bed to chair and chair to bed with DEPENDENT using the least restrictive device within 4 treatment day(s). (3.)Ms. [employer] will ambulate with MAXIMAL ASSIST for 3 feet with the least restrictive device within 4 treatment day(s). LTG:  (1.)Ms. [employer] will move from supine to sit and sit to supine , scoot up and down, and roll side to side in bed with MODERATE ASSIST within 7 treatment day(s). (2.)Ms. [employer] will transfer from bed to chair and chair to bed with MODERATE ASSIST using the least restrictive device within 7 treatment day(s). (3.)Ms. [employer] will ambulate with MODERATE ASSIST for 5 feet with the least restrictive device within 7 treatment day(s). ________________________________________________________________________________________________      PHYSICAL THERAPY: Daily Note and PM 8/26/2020  INPATIENT: PT Visit Days : 5  [place] / [place] / Product Type: Medicare /       NAME/AGE/GENDER: Kyle Edwards is a 81 y.o. female   PRIMARY DIAGNOSIS: Acute metabolic encephalopathy [V09.54]  COVID-19 virus infection [D35.3] Acute metabolic encephalopathy Acute metabolic encephalopathy       ICD-10: Treatment Diagnosis:    · Generalized Muscle Weakness (M62.81)  · Other lack of cordination (R27.8)  · Difficulty in walking, Not elsewhere classified (R26.2)  · Other abnormalities of gait and mobility (R26.89)   Precaution/Allergies:  Patient has no known allergies. ASSESSMENT:     Ms. [employer]   is a disabled female with above diagnosis and flat affect with right sided lean,  who demonstrates with decreased transfers, ambulation and mobility below her prior functional baseline.    All transfers are currently limited at TOTAL ASSISTANCE x 2 bed mobility and heels floated. OT cotreatment. Danielle Shira is positioned in left sidelying. Therapeutic exercise is performed for bilateral LE's for ROM and mobility. Trial skilled PT is indicated for this patient's functional mobility deficits. Palliative care. Overall poor to fair progress towards physical therapy goals. Goals listed above are appropriate but slow progression 8/26/2020. PT will continue efforts as patient is still below functional baseline. RECOMMEND PREVALON BOOTS FOR DECREASED SKIN RISK. At this time, patient is appropriate for Co-treatment with occupational therapy when available due to patient's decreased overall endurance/tolerance levels, as well as need for high level skilled assistance to complete functional transfers/mobility and functional tasks. Danielle Shira is appropriate for a multidisciplinary co-treatment of PT and OT to address goals of both disciplines. This section established at most recent assessment   PROBLEM LIST (Impairments causing functional limitations):  1. Decreased Strength  2. Decreased ADL/Functional Activities  3. Decreased Transfer Abilities  4. Decreased Ambulation Ability/Technique  5. Decreased Balance  6. Increased Pain  7. Decreased Activity Tolerance  8. Decreased Pacing Skills   INTERVENTIONS PLANNED: (Benefits and precautions of physical therapy have been discussed with the patient.)  1. Balance Exercise  2. Bed Mobility  3. Family Education  4. Therapeutic Activites  5. Therapeutic Exercise/Strengthening     TREATMENT PLAN: Frequency/Duration: 3 for duration of hospital stay  Rehabilitation Potential For Stated Goals: [facility] (at time of discharge pending progress):    Placement: It is my opinion, based on this patient's performance to date, that Ms. Darcy Suazo may benefit from intensive therapy at a [address] [place] [address] after discharge due to the functional deficits listed above that are likely to improve with skilled rehabilitation and concerns that he/she may be unsafe to be unsupervised at home due to debility and decreased mobility. .  Equipment:    None at this time              HISTORY:   History of Present Injury/Illness (Reason for Referral):  PER MD H&P   Joaquina Live is a 81 y.o. female with medical history significant diabetes, hypertension, recently discharged from [facility] (8/2-8/7) after being treated for AMS due to UTI. She was tested for COVID during the admission and required 2L O2 NC for acute hypoxic respiratory failure but was weaned down to room air after a few days. SLP evaluated the patient and has approved for pureed diet. Per conversation with the daughter during the admission, patient has been having decreased PO intake and difficulty with eating and as a result, SLP at [facility] has recommended pureed diet as well. She was minimally verbal (although unclear speech), alert and following commands with saturations above 94% on room air on discharge on 8/7 back to [facility]. P    Patient presented today due to altered mental status and decreased appetite. Patient was reportedly hypoxic upon EMS's arrival with saturating of 86% on room air. In the ED, patient is hemodynamically stable, saturating at 95% on room air. Labs are unremarkable and similar to labs on the day of discharge except for increased BUN/Cr of 36/1.37 (from 17/0.79). CXR showed no acute cardiopulmonary disease and appears slightly improved in the bilateral infiltrate. 10 systems reviewed and negative except as noted in HPI. Past Medical History/Comorbidities:   Ms. Imelda Bernal  has a past medical history of Benign hypertension, Controlled type 2 diabetes mellitus with diabetic autonomic neuropathy, with long-term current use of insulin ([facility] [address] Edema, and Hyperlipidemia.   Ms. Imelda Bernal  has a past surgical history that includes hx coronary artery bypass graft (2005); hx knee arthroscopy (Bilateral); hx amputation (Left); and hx partial hysterectomy. Social History/Living Environment:   Home Environment: Skilled nursing facility  One/Two Story Residence: Other (Comment)  Living Alone: No  Support Systems: Marcelino Brantley, Child(tori)  Patient Expects to be Discharged to[de-identified] Skilled nursing facility  Current DME Used/Available at Home: Wheelchair  Prior Level of Function/Work/Activity:  Limited and virtually bedbound. Dominant Side:         RIGHT    Personal Factors:          Sex:  female        Age:  81 y.o. Number of Personal Factors/Comorbidities that affect the Plan of Care: 1-2: MODERATE COMPLEXITY   EXAMINATION:   Most Recent Physical Functioning:   Gross Assessment:  AROM: Grossly decreased, non-functional  Strength: Grossly decreased, non-functional  Coordination: Grossly decreased, non-functional  Tone: Normal  Sensation: Impaired               Posture:  Posture (WDL): Exceptions to WDL  Posture Assessment: Cervical, Forward head  Balance:    Bed Mobility:  Rolling: Total assistance  Scooting: Total assistance  Wheelchair Mobility:     Transfers:     Gait:            Body Structures Involved:  1. Bones  2. Joints  3. Muscles  4. Ligaments Body Functions Affected:  1. Neuromusculoskeletal  2. Movement Related  3. Skin Related  4. Metobolic/Endocrine Activities and Participation Affected:  1. Communication  2. Mobility  3. Self Care  4. Domestic Life  5. Community, Social and [place]   Number of elements that affect the Plan of Care: 3: MODERATE COMPLEXITY   CLINICAL PRESENTATION:   Presentation: Evolving clinical presentation with changing clinical characteristics: MODERATE COMPLEXITY   CLINICAL DECISION MAKIN [facility] Mobility Inpatient Short Form  How much difficulty does the patient currently have. .. Unable A Lot A Little None   1. Turning over in bed (including adjusting bedclothes, sheets and blankets)?    [x] 1   [] 2   [] 3   [] 4 2.  Sitting down on and standing up from a chair with arms ( e.g., wheelchair, bedside commode, etc.)   [x] 1   [] 2   [] 3   [] 4   3. Moving from lying on back to sitting on the side of the bed? [x] 1   [] 2   [] 3   [] 4   How much help from another person does the patient currently need. .. Total A Lot A Little None   4. Moving to and from a bed to a chair (including a wheelchair)? [x] 1   [] 2   [] 3   [] 4   5. Need to walk in hospital room? [x] 1   [] 2   [] 3   [] 4   6. Climbing 3-5 steps with a railing? [x] 1   [] 2   [] 3   [] 4   © 2007, Trustees of [address] [place] [address] [address] [id number], under license to [employer]. All rights reserved      Score:  Initial: 6 Most Recent: X (Date: -- )    Interpretation of Tool:  Represents activities that are increasingly more difficult (i.e. Bed mobility, Transfers, Gait). Medical Necessity:     · Patient demonstrates   · poor  ·  rehab potential due to higher previous functional level. Reason for Services/Other Comments:  · Patient continues to require skilled intervention due to   · medical complications, patient unable to attend/participate in therapy as expected, and flat affect and debility with decreased mobility. · .   Use of outcome tool(s) and clinical judgement create a POC that gives a: Questionable prediction of patient's progress: MODERATE COMPLEXITY            TREATMENT:   (In addition to Assessment/Re-Assessment sessions the following treatments were rendered)   Pre-treatment Symptoms/Complaints:  0/10 no pain reported. Pain: Initial:   Pain Intensity 1: 0  Post Session:  0/10        Therapeutic Activity: (    15 mins): Therapeutic activities including Bed transfers and bed mobility, dangling EOB and trunk mobility balance to improve mobility, strength, balance, and coordination. Required moderate   to promote coordination of bilateral, lower extremity(s) and promote motor control of bilateral, lower extremity(s).      Therapeutic Exercise: ( 8 mins):  Exercises per grid below to improve mobility, strength, balance, and coordination. Required minimal visual, verbal, manual, and tactile cues to promote proper body alignment, promote proper body posture, and promote proper body mechanics. Progressed repetitions as indicated. In bed with PROM     Date:  8/26 Date:   Date:     Activity/Exercise Parameters Parameters Parameters   AP's  10 x's      HIP ABD  10 x's      Marches  10 x's     LAQ's 10 x's                         Braces/Orthotics/Lines/Etc:   · O2 Device: Room air and PURE WICK   Treatment/Session Assessment:    · Response to Treatment: limited mobility and flat affect with rightward trunk lean. · Interdisciplinary Collaboration:   o Physical Therapist  o Registered Nurse  · After treatment position/precautions:   o Supine in bed  o Bed alarm/tab alert on  o Bed/Chair-wheels locked  o Bed in low position  o Call light within reach  o RN notified  o Side rails x 3   · Compliance with Program/Exercises: Noncompliant much of the time  · Recommendations/Intent for next treatment session: \"Next visit will focus on advancements to more challenging activities, reduction in assistance provided, and transfers, and mobility for bilateral LE's. \".   Total Treatment Duration:  PT Patient Time In/Time Out  Time In: 1525  Time Out: [address] [place], [place]

## 2020-08-27 VITALS
TEMPERATURE: 98.1 F | SYSTOLIC BLOOD PRESSURE: 146 MMHG | WEIGHT: 202.38 LBS | OXYGEN SATURATION: 96 % | HEIGHT: 65 IN | HEART RATE: 75 BPM | BODY MASS INDEX: 33.72 KG/M2 | RESPIRATION RATE: 18 BRPM | DIASTOLIC BLOOD PRESSURE: 66 MMHG

## 2020-08-27 LAB
GLUCOSE BLD STRIP.AUTO-MCNC: 134 MG/DL (ref 65–100)
GLUCOSE BLD STRIP.AUTO-MCNC: 151 MG/DL (ref 65–100)

## 2020-08-27 PROCEDURE — 74011250636 HC RX REV CODE- 250/636: Performed by: INTERNAL MEDICINE

## 2020-08-27 PROCEDURE — 74011000250 HC RX REV CODE- 250: Performed by: INTERNAL MEDICINE

## 2020-08-27 PROCEDURE — 82962 GLUCOSE BLOOD TEST: CPT

## 2020-08-27 RX ORDER — INSULIN LISPRO 100 [IU]/ML
INJECTION, SOLUTION INTRAVENOUS; SUBCUTANEOUS
Qty: 1 PEN | Refills: 0 | Status: SHIPPED | OUTPATIENT
Start: 2020-08-27

## 2020-08-27 RX ADMIN — Medication 10 ML: at 12:05

## 2020-08-27 RX ADMIN — Medication 10 ML: at 05:59

## 2020-08-27 RX ADMIN — ENOXAPARIN SODIUM 40 MG: 40 INJECTION SUBCUTANEOUS at 08:52

## 2020-08-27 RX ADMIN — FAMOTIDINE 20 MG: 10 INJECTION INTRAVENOUS at 08:52

## 2020-08-27 NOTE — PROGRESS NOTES
Pt is resting in bed at this time. Pt has alert and confused. Pt has no s/sx of distress at this time. Pt is on RA. Pt has safety measures in place. Pt has call light within reach. Will prepare report for oncoming shift.

## 2020-08-27 NOTE — PROGRESS NOTES
Problem: Falls - Risk of  Goal: *Absence of Falls  Description: Document Cristiano Alaniz Fall Risk and appropriate interventions in the flowsheet.   Outcome: Progressing Towards Goal  Note: Fall Risk Interventions:  Mobility Interventions: Communicate number of staff needed for ambulation/transfer    Mentation Interventions: Evaluate medications/consider consulting pharmacy    Medication Interventions: Evaluate medications/consider consulting pharmacy    Elimination Interventions: Call light in reach

## 2020-08-27 NOTE — PROGRESS NOTES
Pt resting in bed comfortably at this time, unable to assess mental status, pt non verbal. No distress noted, respirations even and unlabored on room air. Pt denies pain at this time. Pt instructed to call for assistance if needed, call light in place, will continue to monitor.

## 2020-08-27 NOTE — DISCHARGE SUMMARY
Hospitalist Discharge Summary     Patient ID:  Kallie Pereira  [id number]  82 y.o.  1935  Admit date: 8/10/2020  3:32 PM  Discharge date and time: 8/27/2020  Attending: Kavitha Reis DO  PCP:  Nini Cohen MD  Treatment Team: Attending Provider: Kavitha Reis DO; Utilization Review: Ericka Sinha RN; Consulting Provider: Jennifer Denton DO; Care Manager: Yfn Barron RN; Primary Nurse: Osmin Cortes RN; Physical Therapist: Zenon Dee PT; Occupational Therapist: Dinorah Mendez    Principal Diagnosis Acute metabolic encephalopathy   Principal Problem:    Acute metabolic encephalopathy (4/6/8735)    Active Problems:    Diabetes mellitus, type II ([facility] ()      Benign hypertension ()      KAILEY (acute kidney injury) ([facility] (8/2/2020)      Alzheimer's dementia with behavioral disturbance ([facility] (8/2/2020)      DNR (do not resuscitate) (8/2/2020)      COVID-19 virus infection (8/10/2020)     Hospital Course: \"81 y. o. female with medical history significant for DM, HTN who was recently hospitalized from 8/2-7 for UTI and metabolic encephalopathy. Tested for COVID due to placement and was positive. Weaned from 2L to RA. DC to [facility] on 8/7. Was reportedly tolerating diet and following commands. Brought back on 8/10 with encephalopathy, O2 sats with EMS 86% (but no hypoxia since admission). Remained non-verbal, no respiratory issues.  Neurology feel encephalopathy due to persistent delirium. Started on TFs via NGT on 8/13. Extensive discussions have been had with the family by Hospitalist team regarding poor prognosis given her mental status with underlying dementia.  Family has decided to not forward with PEG placement but having a hard time accepting the idea of patient being without any form of nutrition. Palliative care has been brought in to help with decision making for hospice, comfort care, palliative measures. \" Family with difficult decision for hospice versus palliative care. Final decision is to go back to [facility] with palliative care and possible transition to hospice. Patient has pleasure feeding with 1:1 assistance. Difficulty with swallowing and will pocket food. Family understand high risk of aspiration but still want to be discharge with palliative care. They are aware of potential transition to hospice very soon if not able to receive proper nutrition. SS alone since having poor PO intake. Xarelto not given in hospital. Since on palliative care goals, will stop due to high risk of bleeding if fell. Hx of afib in the past but has been normal sinus. I have updated the daughter on plan of care. Please refer to the admission H&P for details of presentation. In summary, the patient is stable for discharge. Significant Diagnostic Studies:       Labs: Results:       Chemistry No results for input(s): GLU, NA, K, CL, CO2, BUN, CREA, CA, AGAP, BUCR, TBIL, AP, TP, ALB, GLOB, AGRAT in the last 72 hours. No lab exists for component: GPT   CBC w/Diff No results for input(s): WBC, RBC, HGB, HCT, PLT, GRANS, LYMPH, EOS, HGBEXT, HCTEXT, PLTEXT in the last 72 hours. Cardiac Enzymes No results for input(s): CPK, CKND1, NARENDRA in the last 72 hours. No lab exists for component: CKRMB, TROIP   Coagulation No results for input(s): PTP, INR, APTT, INREXT in the last 72 hours. Lipid Panel Lab Results   Component Value Date/Time    Cholesterol, total 131 05/26/2017 10:23 AM    HDL Cholesterol 55 05/26/2017 10:23 AM    LDL, calculated 61 05/26/2017 10:23 AM    VLDL, calculated 15 05/26/2017 10:23 AM    Triglyceride 74 05/26/2017 10:23 AM      BNP No results for input(s): BNPP in the last 72 hours. Liver Enzymes No results for input(s): TP, ALB, TBIL, AP in the last 72 hours.     No lab exists for component: SGOT, GPT, DBIL   Thyroid Studies Lab Results   Component Value Date/Time    TSH 0.716 08/12/2020 06:40 AM            Discharge Exam:  Visit Vitals  /66 (BP 1 Location: Right arm, BP Patient Position: At rest)   Pulse 75   Temp 98.1 °F (36.7 °C)   Resp 18   Ht 5' 5\" (1.651 m)   Wt 91.8 kg (202 lb 6.1 oz)   SpO2 96%   BMI 33.68 kg/m²     General appearance: alert, cooperative, no distress, confused. Will answer some questions like stating she is feeling better but responses are limited. Lungs: clear to auscultation bilaterally but poor inspiratory effort   Heart: regular rate and rhythm, S1, S2 normal  Abdomen: soft, non-tender. Bowel sounds normal.   Extremities: missing digits to left foot. No obvious edema   Neurologic: demented     Disposition:SNF with palliative care. Likely will need transition to hospice in near future   Discharge Condition: stable  Patient Instructions: As above   Current Discharge Medication List      START taking these medications    Details   insulin lispro (HUMALOG) 100 unit/mL kwikpen Less than 150 =   0 units           150 -199 =   2 units  200 -249 =   4 units  250 -299 =   6 units  300 -349 =   8 units  Before meals and at night  Qty: 1 Pen, Refills: 0         CONTINUE these medications which have NOT CHANGED    Details   famotidine (PEPCID) 20 mg tablet Take 1 Tab by mouth two (2) times a day. Qty: 14 Tab, Refills: 0      valsartan (DIOVAN) 40 mg tablet Take 1 Tab by mouth daily.   Qty: 30 Tab, Refills: 1      rosuvastatin (CRESTOR) 20 mg tablet TAKE 1 TABLET BY MOUTH EVERY DAY  Qty: 90 Tab, Refills: 2    Associated Diagnoses: Hyperlipidemia, unspecified hyperlipidemia type      BD INSULIN PEN NEEDLE UF MINI 31 gauge x 3/16\" ndle USE 1 NEEDLE DAILY  Qty: 30 Pen Needle, Refills: 12      TRAVATAN Z 0.004 % ophthalmic solution INSTILL 1 DROP IN BOTH EYES NIGHTLY  Refills: 12      TRUE METRIX AIR GLUCOSE METER monitoring kit USE AS DIRECTED  Qty: 1 Kit, Refills: 0      BD SINGLE USE SWABS REGULAR padm       TRUE METRIX GLUCOSE TEST STRIP strip       TRUE METRIX LEVEL 1 soln       TRUEPLUS LANCETS 28 gauge misc          STOP taking these medications       insulin detemir U-100 (Levemir Flexpen) 100 unit/mL (3 mL) inpn Comments:   Reason for Stopping:         rivaroxaban (Xarelto) 10 mg tablet Comments:   Reason for Stopping:         cefUROXime (CEFTIN) 500 mg tablet Comments:   Reason for Stopping:         dexAMETHasone (DECADRON) 2 mg tablet Comments:   Reason for Stopping:         diclofenac EC (VOLTAREN) 75 mg EC tablet Comments:   Reason for Stopping:         insulin lispro (HUMALOG) 100 unit/mL injection Comments:   Reason for Stopping:               Activity: total assistance of 2. Diet:Pleasure feeding with mechanical soft. Will need 1:1 assistance with feeding. High aspiration risk  Wound Care: None     Follow-up PCP in one week.    ·     Time spent to discharge patient 35 minutes  Signed:  Reyna Kennedy DO  8/27/2020  12:35 PM

## 2020-08-28 ENCOUNTER — PATIENT OUTREACH (OUTPATIENT)
Dept: CASE MANAGEMENT | Age: 85
End: 2020-08-28

## 2020-08-28 NOTE — PROGRESS NOTES
Pt was discharge back to facility to with Palliative care services that are offered by [facility]. Catherine garber is aware the TC LOC renewal was submitted but have not yet received confirmation on approval.  Transport via [place]  No further needs from Case management.     Milestones met    Care Management Interventions  PCP Verified by CM: Yes(Dr. Bora Kaur)  Mode of Transport at Discharge: BLS  Transition of Care Consult (CM Consult): SNF  Partner SNF: No  Reason Why Partner SNF Not Chosen: Location  Physical Therapy Consult: Yes  Occupational Therapy Consult: Yes  Current Support Network: Nursing Facility  Confirm Follow Up Transport: Other (see comment)(In house MD)  [place] of Choice List was Provided with Basic Dialogue that Supports the Patient's Individualized Plan of Care/Goals, Treatment Preferences and Shares the Quality Data Associated with the Providers?: Yes  Discharge Location  Discharge Placement: Skilled nursing facility

## 2020-08-28 NOTE — PROGRESS NOTES
No transition of care outreach indicated due to patient discharge [facility] a [address] [place] [address] [facility]

## 2020-09-08 NOTE — PROGRESS NOTES
Physician Progress Note      Anca Galdamez  CSN #:                  [id number]  :                       1935  ADMIT DATE:       8/10/2020 3:32 PM  [address] DATE:        2020 2:16 PM  RESPONDING  PROVIDER #:        Deedee GONZALEZ DO          QUERY TEXT:    Patient tested positive for COVID-19 at prior hospital admission. On this admission, patient tested positive again 3 days after admission but is documented as saturating well on RA with no reported fevers. Patient was placed on isolation. After study, can the patient's diagnosis be further clarified as: The medical record reflects the following:  Risk Factors: advanced age, COVID 23 positive test  Clinical Indicators:  CXR on admission reports as stable. O2 saturations in 90's to 100% on RA  Treatment: COVID unit. Isolation    Thank You,  Xander Ingram RN CDS/CDI  Options provided:  -- Active COVID 19 infection  -- History of COVID 19 . Viral shedding only  -- Other - I will add my own diagnosis  -- Disagree - Not applicable / Not valid  -- Disagree - Clinically unable to determine / Unknown  -- Refer to Clinical Documentation Reviewer    PROVIDER RESPONSE TEXT:    This patient has active COVID 19 infection. Query created by: Ana Mane on 2020 10:16 AM      QUERY TEXT:    Pt admitted with AMS and has metabolic encephalopathy documented. Also documented that patient has AMS due to persistent delirium in the setting of dementia. At time of discharge patient still confused. Can the cause of patient's confusion be further specified as: The medical record reflects the following:  Risk Factors: advanced age, hx dementia  Clinical Indicators: Documentation of metabolic encephalopathy. As per neurology documentation, feel encephalopathy due to persistent delirium. Treatment: Frequent nursing rounds. Neurology consult    Thank You.   Xander Ingram RN CDS/CDI  Options provided:  -- Metabolic encephalopathy  -- Dementia with behavioral disturbances only  -- Other - I will add my own diagnosis  -- Disagree - Not applicable / Not valid  -- Disagree - Clinically unable to determine / Unknown  -- Refer to Clinical Documentation Reviewer    PROVIDER RESPONSE TEXT:    This patient has dementia with behavioral disturbances only.     Query created by: Lawyer Ware on 9/2/2020 10:31 AM      Electronically signed by:  Bandar Lee DO 9/8/2020 7:40 AM

## 2022-03-18 PROBLEM — N39.0 UTI (URINARY TRACT INFECTION): Status: ACTIVE | Noted: 2020-08-02

## 2022-03-18 PROBLEM — Z66 DNR (DO NOT RESUSCITATE): Status: ACTIVE | Noted: 2020-08-02

## 2022-03-19 PROBLEM — A41.9 SEPSIS (HCC): Status: ACTIVE | Noted: 2020-08-04

## 2022-03-19 PROBLEM — E87.0 HYPERNATREMIA: Status: ACTIVE | Noted: 2020-08-02

## 2022-03-19 PROBLEM — N17.9 AKI (ACUTE KIDNEY INJURY) (HCC): Status: ACTIVE | Noted: 2020-08-02

## 2022-03-19 PROBLEM — G93.41 ACUTE METABOLIC ENCEPHALOPATHY: Status: ACTIVE | Noted: 2020-08-02

## 2022-03-19 PROBLEM — G30.9 ALZHEIMER'S DEMENTIA WITH BEHAVIORAL DISTURBANCE (HCC): Status: ACTIVE | Noted: 2020-08-02

## 2022-03-19 PROBLEM — F02.818 ALZHEIMER'S DEMENTIA WITH BEHAVIORAL DISTURBANCE (HCC): Status: ACTIVE | Noted: 2020-08-02

## 2022-03-19 PROBLEM — U07.1 COVID-19 VIRUS INFECTION: Status: ACTIVE | Noted: 2020-08-10

## 2022-11-14 NOTE — PROGRESS NOTES
Is This A New Presentation, Or A Follow-Up?: Acne Problem: Falls - Risk of  Goal: *Absence of Falls  Description: Document Morenita Orourke Fall Risk and appropriate interventions in the flowsheet. Outcome: Progressing Towards Goal  Note: Fall Risk Interventions:  Mobility Interventions: Bed/chair exit alarm    Mentation Interventions: Bed/chair exit alarm, Increase mobility, More frequent rounding, Reorient patient    Medication Interventions: Bed/chair exit alarm    Elimination Interventions: Call light in reach              Problem: Patient Education: Go to Patient Education Activity  Goal: Patient/Family Education  Outcome: Progressing Towards Goal     Problem: Airway Clearance - Ineffective  Goal: Achieve or maintain patent airway  Outcome: Progressing Towards Goal     Problem: Gas Exchange - Impaired  Goal: Absence of hypoxia  Outcome: Progressing Towards Goal  Goal: Promote optimal lung function  Outcome: Progressing Towards Goal     Problem: Breathing Pattern - Ineffective  Goal: Ability to achieve and maintain a regular respiratory rate  Outcome: Progressing Towards Goal     Problem:  Body Temperature -  Risk of, Imbalanced  Goal: Ability to maintain a body temperature within defined limits  Outcome: Progressing Towards Goal  Goal: Will regain or maintain usual level of consciousness  Outcome: Progressing Towards Goal  Goal: Complications related to the disease process, condition or treatment will be avoided or minimized  Outcome: Progressing Towards Goal     Problem: Isolation Precautions - Risk of Spread of Infection  Goal: Prevent transmission of infectious organism to others  Outcome: Progressing Towards Goal     Problem: Nutrition Deficits  Goal: Optimize nutrtional status  Outcome: Progressing Towards Goal     Problem: Risk for Fluid Volume Deficit  Goal: Maintain normal heart rhythm  Outcome: Progressing Towards Goal  Goal: Maintain absence of muscle cramping  Outcome: Progressing Towards Goal  Goal: Maintain normal serum potassium, sodium, calcium, phosphorus, and pH  Outcome: Progressing Towards Goal     Problem: Loneliness or Risk for Loneliness  Goal: Demonstrate positive use of time alone when socialization is not possible  Outcome: Progressing Towards Goal     Problem: Fatigue  Goal: Verbalize increase energy and improved vitality  Outcome: Progressing Towards Goal     Problem: Patient Education: Go to Patient Education Activity  Goal: Patient/Family Education  Outcome: Progressing Towards Goal     Problem: Nutrition Deficit  Goal: *Optimize nutritional status  Outcome: Progressing Towards Goal     Problem: Patient Education: Go to Patient Education Activity  Goal: Patient/Family Education  Outcome: Progressing Towards Goal     Problem: Pressure Injury - Risk of  Goal: *Prevention of pressure injury  Description: Document Christopher Scale and appropriate interventions in the flowsheet. Outcome: Progressing Towards Goal  Note: Pressure Injury Interventions:  Sensory Interventions: Assess changes in LOC, Pressure redistribution bed/mattress (bed type), Minimize linen layers, Keep linens dry and wrinkle-free, Monitor skin under medical devices    Moisture Interventions: Absorbent underpads, Apply protective barrier, creams and emollients, Minimize layers    Activity Interventions: Pressure redistribution bed/mattress(bed type)    Mobility Interventions: Pressure redistribution bed/mattress (bed type)    Nutrition Interventions: Document food/fluid/supplement intake    Friction and Shear Interventions: Apply protective barrier, creams and emollients, Lift sheet, Minimize layers                Problem: Patient Education: Go to Patient Education Activity  Goal: Patient/Family Education  Outcome: Progressing Towards Goal     Problem: Patient Education: Go to Patient Education Activity  Goal: Patient/Family Education  Outcome: Progressing Towards Goal     Problem: Patient Education: Go to Patient Education Activity  Goal: Patient/Family Education  Description: 1. Patient will follow 100% of one step verbal or visual commands to increase participation during ADL performance. 2. Patient will complete self-feeding with min A and adaptive equipment as needed. 3. Patient will tolerate 25  minutes of OT treatment with 2-3 rest breaks to increase activity tolerance for ADLs. 4. Patient will complete functional transfers with mod A and adaptive equipment as needed. 5. Patient will complete upper body bathing and dressing with min A and adaptive equipment as needed. 6. Patient will complete self-grooming with min A and adaptive equipment as needed.      Timeframe: 7 visits         Outcome: Progressing Towards Goal     Problem: Altered Thought Process (Adult/Pediatric)  Goal: *STG: Participates in treatment plan  Outcome: Progressing Towards Goal  Goal: *STG: Remains safe in hospital  Outcome: Progressing Towards Goal  Goal: *STG: Complies with medication therapy  Outcome: Progressing Towards Goal     Problem: Nutrition Deficit  Goal: *Optimize nutritional status  Outcome: Progressing Towards Goal

## 2023-05-10 RX ORDER — FAMOTIDINE 20 MG/1
20 TABLET, FILM COATED ORAL 2 TIMES DAILY
COMMUNITY
Start: 2020-08-07

## 2023-05-10 RX ORDER — VALSARTAN 40 MG/1
40 TABLET ORAL DAILY
COMMUNITY
Start: 2020-08-07

## 2023-05-10 RX ORDER — ROSUVASTATIN CALCIUM 20 MG/1
1 TABLET, COATED ORAL DAILY
COMMUNITY
Start: 2017-09-25

## 2023-05-10 RX ORDER — TRAVOPROST OPHTHALMIC SOLUTION 0.04 MG/ML
SOLUTION OPHTHALMIC
COMMUNITY
Start: 2016-12-22
